# Patient Record
Sex: FEMALE | Race: WHITE | NOT HISPANIC OR LATINO | Employment: OTHER | URBAN - METROPOLITAN AREA
[De-identification: names, ages, dates, MRNs, and addresses within clinical notes are randomized per-mention and may not be internally consistent; named-entity substitution may affect disease eponyms.]

---

## 2017-01-16 ENCOUNTER — OFFICE VISIT (OUTPATIENT)
Dept: RHEUMATOLOGY | Facility: CLINIC | Age: 62
End: 2017-01-16
Payer: COMMERCIAL

## 2017-01-16 VITALS
WEIGHT: 227.19 LBS | DIASTOLIC BLOOD PRESSURE: 80 MMHG | HEIGHT: 60 IN | HEART RATE: 78 BPM | BODY MASS INDEX: 44.6 KG/M2 | SYSTOLIC BLOOD PRESSURE: 139 MMHG

## 2017-01-16 DIAGNOSIS — M19.90 INFLAMMATORY ARTHRITIS: Primary | ICD-10-CM

## 2017-01-16 DIAGNOSIS — M17.0 PRIMARY OSTEOARTHRITIS OF BOTH KNEES: ICD-10-CM

## 2017-01-16 DIAGNOSIS — E66.01 MORBID OBESITY WITH BMI OF 45.0-49.9, ADULT: ICD-10-CM

## 2017-01-16 PROCEDURE — 99213 OFFICE O/P EST LOW 20 MIN: CPT | Mod: S$GLB,,, | Performed by: INTERNAL MEDICINE

## 2017-01-16 PROCEDURE — 1159F MED LIST DOCD IN RCRD: CPT | Mod: S$GLB,,, | Performed by: INTERNAL MEDICINE

## 2017-01-16 PROCEDURE — 99999 PR PBB SHADOW E&M-EST. PATIENT-LVL III: CPT | Mod: PBBFAC,,, | Performed by: INTERNAL MEDICINE

## 2017-01-16 PROCEDURE — 3079F DIAST BP 80-89 MM HG: CPT | Mod: S$GLB,,, | Performed by: INTERNAL MEDICINE

## 2017-01-16 PROCEDURE — 3075F SYST BP GE 130 - 139MM HG: CPT | Mod: S$GLB,,, | Performed by: INTERNAL MEDICINE

## 2017-01-16 ASSESSMENT — ROUTINE ASSESSMENT OF PATIENT INDEX DATA (RAPID3)
AM STIFFNESS SCORE: 1, YES
WHEN YOU AWAKENED IN THE MORNING OVER THE LAST WEEK, PLEASE INDICATE THE AMOUNT OF TIME IT TAKES UNTIL YOU ARE AS LIMBER AS YOU WILL BE FOR THE DAY: 10MIN
PSYCHOLOGICAL DISTRESS SCORE: 3.3
PAIN SCORE: 4
FATIGUE SCORE: .5
TOTAL RAPID3 SCORE: 2.78
MDHAQ FUNCTION SCORE: .4
PATIENT GLOBAL ASSESSMENT SCORE: 3

## 2017-01-17 NOTE — PROGRESS NOTES
History of present illness: 62-year-old female I saw initially 2 years ago.  She presented at that time with a migratory arthritis.  It followed a shingles-like rash.  It lasted for several months but then resolved.  She had increased inflammatory markers but was otherwise seronegative.  She has been treated primarily with anti-inflammatory medications.  She has had short-term corticosteroids is not been on chronic prednisone therapy.  She has had several flares since that time.  In addition she has developed constant pain in the knees.  She has evidence of osteoarthritis.  I injected her knee in September and her knee continues to do better.    She still complains of some intermittent aching.  She stop sulindac because of side effects.  She is now on Advil 4 daily.  This helps and she tolerates it.  She has had some burning pain at night.  She has some pain with activity.  She has no morning stiffness.  She has had no recent joint swelling.  She is completing her evaluation for sleep apnea.  She has had no other recent medical problems.    Physical examination:  Musculoskeletal: She has tenderness to palpation in the right knee in the right ankle.  She has full range of motion of these joints.  She has no evidence of active synovitis.    Assessment: She has underlying osteoarthritis of the knees.  She has had an intermittent inflammatory arthritis of uncertain etiology.    Plans:  1.  Laboratory studies are obtained  2.  I have set her up for a joint scan  3.  She is to continue Advil up to 8 daily  4.  I recommend topical medication with lidocaine.  5.  Return to see me in 4 months and less her symptoms get worse.

## 2017-01-25 ENCOUNTER — HOSPITAL ENCOUNTER (OUTPATIENT)
Dept: RADIOLOGY | Facility: HOSPITAL | Age: 62
Discharge: HOME | End: 2017-01-25
Attending: INTERNAL MEDICINE
Payer: COMMERCIAL

## 2017-01-25 DIAGNOSIS — M19.90 INFLAMMATORY ARTHRITIS: ICD-10-CM

## 2017-01-25 PROCEDURE — 78306 BONE IMAGING WHOLE BODY: CPT | Mod: 26,,, | Performed by: RADIOLOGY

## 2017-01-25 PROCEDURE — A9512 TC99M PERTECHNETATE: HCPCS

## 2017-02-22 ENCOUNTER — OFFICE VISIT (OUTPATIENT)
Dept: RHEUMATOLOGY | Facility: CLINIC | Age: 62
End: 2017-02-22
Payer: COMMERCIAL

## 2017-02-22 DIAGNOSIS — M17.0 PRIMARY OSTEOARTHRITIS OF BOTH KNEES: ICD-10-CM

## 2017-02-22 DIAGNOSIS — M06.00 SERONEGATIVE RHEUMATOID ARTHRITIS: Primary | ICD-10-CM

## 2017-02-22 PROCEDURE — 1160F RVW MEDS BY RX/DR IN RCRD: CPT | Mod: S$GLB,,, | Performed by: INTERNAL MEDICINE

## 2017-02-22 PROCEDURE — 99999 PR PBB SHADOW E&M-EST. PATIENT-LVL II: CPT | Mod: PBBFAC,,, | Performed by: INTERNAL MEDICINE

## 2017-02-22 PROCEDURE — 99214 OFFICE O/P EST MOD 30 MIN: CPT | Mod: S$GLB,,, | Performed by: INTERNAL MEDICINE

## 2017-02-22 RX ORDER — HYDROXYCHLOROQUINE SULFATE 200 MG/1
400 TABLET, FILM COATED ORAL DAILY
Qty: 60 TABLET | Refills: 5 | Status: SHIPPED | OUTPATIENT
Start: 2017-02-22 | End: 2017-03-24

## 2017-02-27 NOTE — PROGRESS NOTES
History of present illness: 62-year-old female has osteoarthritis of the knees.  She also has an intermittent inflammatory arthritis which is seronegative.  She was last seen in January.  She still had increased inflammatory markers.  She had a joint scan which showed persistent inflammation.  I therefore brought her ran to discuss the possibility of DMARD's.    She still has some pain in the right knee.  She has had no swelling.  She has some aching all over but it has improved.  She denies any joint swelling.  She remains on ibuprofen daily.  She has some pain with activity.  She has no significant morning stiffness.    Physical examination:  Musculoskeletal: She has pain or range of motion of the left shoulder.  She has decreased range of motion.  She has no trigger area.  She has no swelling.  Right shoulder is unremarkable.  Elbows, wrists, small joints the hands are unremarkable.  Lumbar spine and hips have good range of motion.  She has bony hypertrophy of the knees bilaterally, worse on the left than on the right.  She has pain on range of motion of the knees, worse on the right than on the left.  She has tenderness of the right knee but no effusion.  She has tenderness of the right ankle and bony hypertrophy.    Assessment: Seronegative rheumatoid arthritis    Plans: After discussion with her and her  I elected to place her on Plaquenil 400 mg daily.  She is to continue Advil as before.  She is to keep her previous appointment.

## 2017-03-11 ENCOUNTER — PATIENT MESSAGE (OUTPATIENT)
Dept: RHEUMATOLOGY | Facility: CLINIC | Age: 62
End: 2017-03-11

## 2017-04-24 DIAGNOSIS — J30.9 ALLERGIC RHINITIS: ICD-10-CM

## 2017-04-24 RX ORDER — FLUTICASONE PROPIONATE 50 MCG
SPRAY, SUSPENSION (ML) NASAL
Qty: 16 G | Refills: 2 | Status: SHIPPED | OUTPATIENT
Start: 2017-04-24 | End: 2017-11-08 | Stop reason: SDUPTHER

## 2017-05-01 ENCOUNTER — OFFICE VISIT (OUTPATIENT)
Dept: RHEUMATOLOGY | Facility: CLINIC | Age: 62
End: 2017-05-01
Payer: COMMERCIAL

## 2017-05-01 VITALS
WEIGHT: 225.88 LBS | HEART RATE: 70 BPM | HEIGHT: 60 IN | SYSTOLIC BLOOD PRESSURE: 146 MMHG | DIASTOLIC BLOOD PRESSURE: 77 MMHG | BODY MASS INDEX: 44.35 KG/M2

## 2017-05-01 DIAGNOSIS — M17.11 PRIMARY OSTEOARTHRITIS OF RIGHT KNEE: ICD-10-CM

## 2017-05-01 DIAGNOSIS — M06.00 SERONEGATIVE RHEUMATOID ARTHRITIS: Primary | ICD-10-CM

## 2017-05-01 PROCEDURE — 3078F DIAST BP <80 MM HG: CPT | Mod: S$GLB,,, | Performed by: INTERNAL MEDICINE

## 2017-05-01 PROCEDURE — 3077F SYST BP >= 140 MM HG: CPT | Mod: S$GLB,,, | Performed by: INTERNAL MEDICINE

## 2017-05-01 PROCEDURE — 99999 PR PBB SHADOW E&M-EST. PATIENT-LVL III: CPT | Mod: PBBFAC,,, | Performed by: INTERNAL MEDICINE

## 2017-05-01 PROCEDURE — 20610 DRAIN/INJ JOINT/BURSA W/O US: CPT | Mod: S$GLB,,, | Performed by: INTERNAL MEDICINE

## 2017-05-01 PROCEDURE — 99213 OFFICE O/P EST LOW 20 MIN: CPT | Mod: 25,S$GLB,, | Performed by: INTERNAL MEDICINE

## 2017-05-01 PROCEDURE — 1160F RVW MEDS BY RX/DR IN RCRD: CPT | Mod: S$GLB,,, | Performed by: INTERNAL MEDICINE

## 2017-05-01 RX ADMIN — TRIAMCINOLONE ACETONIDE 40 MG: 40 INJECTION, SUSPENSION INTRA-ARTICULAR; INTRAMUSCULAR at 01:05

## 2017-05-01 NOTE — PROCEDURES
Large Joint Aspiration/Injection  Date/Time: 5/1/2017 1:44 PM  Performed by: MARINE VAN  Authorized by: MARINE VAN     Consent Done?:  Yes (Verbal)  Indications:  Pain  Procedure site marked: Yes      Location:  Knee  Site:  R knee  Prep: Patient was prepped and draped in usual sterile fashion    Needle size:  25 G  Approach:  Medial  Medications:  40 mg triamcinolone acetonide 40 mg/mL  Patient tolerance:  Patient tolerated the procedure well with no immediate complications

## 2017-05-02 RX ORDER — TRIAMCINOLONE ACETONIDE 40 MG/ML
40 INJECTION, SUSPENSION INTRA-ARTICULAR; INTRAMUSCULAR
Status: DISCONTINUED | OUTPATIENT
Start: 2017-05-01 | End: 2017-05-02 | Stop reason: HOSPADM

## 2017-05-02 NOTE — PROGRESS NOTES
History of present illness: 62-year-old female with known osteoarthritis of the knees.  I have been seeing her for an intermittent inflammatory arthritis.  She is seronegative.  She had persistently elevated inflammatory markers.  I ordered a joint scan which showed persistent synovitis.  I started her on Plaquenil in February.  She does not think she has had a response yet.  She still complains of pain, especially in the knees.  She has also had pain in the right buttock.  She has been taking ibuprofen up to 4 daily with some relief.  She denies any joint swelling.  She has had no numbness or tingling.  She is tolerating the medication.    Physical examination:  Musculoskeletal: Cervical spine has good range of motion.  Shoulders, elbows, wrists, small joints of the hands are unremarkable.  She has good range of motion of the lumbar spine.  She has no tender area to palpation.  She has pain on range of motion of the right hip but has good range of motion.  Left hip is unremarkable.  She has bony hypertrophy of both knees but no effusion.  The right knee is tender.  She has tenderness of the right ankle.  Small joints of the feet are unremarkable.    Assessment:  1.  Stable inflammatory arthritis  2.  Her knee problem is more likely due to osteoarthritis    Plans:  1.  I will see how she responds to the injection  2.  Continue medications as before  3.  Return to see me in 3 months

## 2017-05-14 ENCOUNTER — PATIENT MESSAGE (OUTPATIENT)
Dept: RHEUMATOLOGY | Facility: CLINIC | Age: 62
End: 2017-05-14

## 2017-05-14 DIAGNOSIS — M54.32 SCIATICA OF LEFT SIDE: Primary | ICD-10-CM

## 2017-05-15 ENCOUNTER — HOSPITAL ENCOUNTER (EMERGENCY)
Facility: HOSPITAL | Age: 62
Discharge: HOME | End: 2017-05-15
Attending: EMERGENCY MEDICINE
Payer: COMMERCIAL

## 2017-05-15 ENCOUNTER — TELEPHONE (OUTPATIENT)
Dept: RHEUMATOLOGY | Facility: CLINIC | Age: 62
End: 2017-05-15

## 2017-05-15 VITALS
SYSTOLIC BLOOD PRESSURE: 171 MMHG | OXYGEN SATURATION: 97 % | RESPIRATION RATE: 18 BRPM | BODY MASS INDEX: 43.19 KG/M2 | WEIGHT: 220 LBS | TEMPERATURE: 98 F | HEIGHT: 60 IN | HEART RATE: 72 BPM | DIASTOLIC BLOOD PRESSURE: 83 MMHG

## 2017-05-15 DIAGNOSIS — M54.41 RIGHT-SIDED LOW BACK PAIN WITH RIGHT-SIDED SCIATICA, UNSPECIFIED CHRONICITY: Primary | ICD-10-CM

## 2017-05-15 PROCEDURE — 99283 EMERGENCY DEPT VISIT LOW MDM: CPT

## 2017-05-15 PROCEDURE — 99283 EMERGENCY DEPT VISIT LOW MDM: CPT | Mod: ,,, | Performed by: EMERGENCY MEDICINE

## 2017-05-15 RX ORDER — METHYLPREDNISOLONE 4 MG/1
TABLET ORAL
Qty: 1 PACKAGE | Refills: 0 | Status: SHIPPED | OUTPATIENT
Start: 2017-05-15 | End: 2017-06-05

## 2017-05-15 RX ORDER — TRAMADOL HYDROCHLORIDE 50 MG/1
50 TABLET ORAL EVERY 6 HOURS PRN
Qty: 8 TABLET | Refills: 0 | Status: SHIPPED | OUTPATIENT
Start: 2017-05-15 | End: 2017-05-25

## 2017-05-15 RX ORDER — IBUPROFEN 600 MG/1
600 TABLET ORAL 3 TIMES DAILY PRN
Qty: 21 TABLET | Refills: 0 | Status: SHIPPED | OUTPATIENT
Start: 2017-05-15 | End: 2017-05-22

## 2017-05-15 RX ORDER — GABAPENTIN 100 MG/1
100 CAPSULE ORAL 3 TIMES DAILY
Qty: 42 CAPSULE | Refills: 0 | Status: SHIPPED | OUTPATIENT
Start: 2017-05-15 | End: 2017-08-14

## 2017-05-15 NOTE — DISCHARGE INSTRUCTIONS
Back Pain (Acute or Chronic)    Back pain is one of the most common problems. The good news is that most people feel better in 1 to 2 weeks, and most of the rest in 1 to 2 months. Most people can remain active.  People experience and describe pain differently; not everyone is the same.  · The pain can be sharp, stabbing, shooting, aching, cramping or burning.  · Movement, standing, bending, lifting, sitting, or walking may worsen pain.  · It can be localized to one spot or area, or it can be more generalized.  · It can spread or radiate upwards, to the front, or go down your arms or legs (sciatica).  · It can cause muscle spasm.  Most of the time, mechanical problems with the muscles or spine cause the pain. Mechanical problems are usually caused by an injury to the muscles or ligaments. While illness can cause back pain, it is usually not caused by a serious illness. Mechanical problems include:   · Physical activity such as sports, exercise, work, or normal activity  · Overexertion, lifting, pushing, pulling incorrectly or too aggressively  · Sudden twisting, bending, or stretching from an accident, or accidental movement  · Poor posture  · Stretching or moving wrong, without noticing pain at the time  · Poor coordination, lack of regular exercise (check with your doctor about this)  · Spinal disc disease or arthritis  · Stress  Pain can also be related to pregnancy, or illness like appendicitis, bladder or kidney infections, pelvic infections, and many other things.  Acute back pain usually gets better in 1 to 2 weeks. Back pain related to disk disease, arthritis in the spinal joints or spinal stenosis (narrowing of the spinal canal) can become chronic and last for months or years.  Unless you had a physical injury (for example, a car accident or fall) X-rays are usually not needed for the initial evaluation of back pain. If pain continues and does not respond to medical treatment, X-rays and other tests may be  needed.  Home care  Try these home care recommendations:  · When in bed, try to find a position of comfort. A firm mattress is best. Try lying flat on your back with pillows under your knees. You can also try lying on your side with your knees bent up towards your chest and a pillow between your knees.  · At first, do not try to stretch out the sore spots. If there is a strain, it is not like the good soreness you get after exercising without an injury. In this case, stretching may make it worse.  · Avoid prolong sitting, long car rides, or travel. This puts more stress on the lower back than standing or walking.  · During the first 24 to 72 hours after an acute injury or flare up of chronic back pain, apply an ice pack to the painful area for 20 minutes and then remove it for 20 minutes. Do this over a period of 60 to 90 minutes or several times a day. This will reduce swelling and pain. Wrap the ice pack in a thin towel or plastic to protect your skin.  · You can start with ice, then switch to heat. Heat (hot shower, hot bath, or heating pad) reduces pain and works well for muscle spasms. Heat can be applied to the painful area for 20 minutes then remove it for 20 minutes. Do this over a period of 60 to 90 minutes or several times a day. Do not sleep on a heating pad. It can lead to skin burns or tissue damage.  · You can alternate ice and heat therapy. Talk with your doctor about the best treatment for your back pain.  · Therapeutic massage can help relax the back muscles without stretching them.  · Be aware of safe lifting methods and do not lift anything without stretching first.  Medicines  Talk to your doctor before using medicine, especially if you have other medical problems or are taking other medicines.  · You may use over-the-counter medicine as directed on the bottle to control pain, unless another pain medicine was prescribed. If you have chronic conditions like diabetes, liver or kidney disease,  stomach ulcers, or gastrointestinal bleeding, or are taking blood thinners, talk to your doctor before taking any medicine.  · Be careful if you are given a prescription medicines, narcotics, or medicine for muscle spasms. They can cause drowsiness, affect your coordination, reflexes, and judgement. Do not drive or operate heavy machinery.  Follow-up care  Follow up with your healthcare provider, or as advised.   A radiologist will review any X-rays that were taken. Your provide will notify you of any new findings that may affect your care.  Call 911  Call emergency services if any of the following occur:  · Trouble breathing  · Confusion  · Very drowsy or trouble awakening  · Fainting or loss of consciousness  · Rapid or very slow heart rate  · Loss of bowel or bladder control  When to seek medical advice  Call your healthcare provider right away if any of these occur:   · Pain becomes worse or spreads to your legs  · Weakness or numbness in one or both legs  · Numbness in the groin or genital area  Date Last Reviewed: 7/1/2016  © 1148-1791 [employer]. [address] [place] [address], [place] [address]. All rights reserved. This information is not intended as a substitute for professional medical care. Always follow your healthcare professional's instructions.

## 2017-05-15 NOTE — ED AVS SNAPSHOT
[facility]  [address]  [facility] [address]               Apoorva Fernandez   5/15/2017  1:49 PM   ED    Description:  Female : 1955   [facility]           Your Care was Coordinated By:     Provider Role From To    Katia Bee MD Attending Provider 05/15/17 1414 --      Reason for Visit     Hip Pain           Diagnoses this Visit        Comments    Right-sided low back pain with right-sided sciatica, unspecified chronicity    -  Primary       ED Disposition     None           To Do List           Follow-up Information     Follow up with Najma Rangel MD In 3 days.    Specialties:  Internal Medicine, Pediatrics    Contact information:    [address] [place] [id number] [phone]         These Medications        Disp Refills Start End    gabapentin (NEURONTIN) 100 MG capsule 42 capsule 0 5/15/2017 2017    Take 1 capsule (100 mg total) by mouth 3 (three) times daily. - Oral    Pharmacy: [facility] [address] [place] [address] [address] [facility] Ph #: [phone]       methylPREDNISolone (MEDROL DOSEPACK) 4 mg tablet 1 Package 0 5/15/2017 2017    As directed    Pharmacy: [facility] [address] [facility] Ph #: [phone]       tramadol (ULTRAM) 50 mg tablet 8 tablet 0 5/15/2017 2017    Take 1 tablet (50 mg total) by mouth every 6 (six) hours as needed for Pain. - Oral    Pharmacy: [facility] [address] [place] [address] [address] [facility] Ph #: [phone]       ibuprofen (ADVIL,MOTRIN) 600 MG tablet 21 tablet 0 5/15/2017 2017    Take 1 tablet (600 mg total) by mouth 3 (three) times daily as needed for Pain. - Oral    Pharmacy: [facility] [address] [facility] Ph #: [phone]         [facility]     [facility]  - 24/7 Assistance  Unless otherwise directed by your provider, please contact [facility], our nurse care line that is available for 24/7 assistance.     Registered nurses in the [facility] provide: appointment scheduling, clinical advisement, health education, and other advisory services.  Call: [phone] (toll free)               Medications           Message regarding Medications     Verify the changes and/or additions to your medication regime listed below are the same as discussed with your clinician today.  If any of these changes or additions are incorrect, please notify your healthcare provider.        START taking these NEW medications        Refills    gabapentin (NEURONTIN) 100 MG capsule 0    Sig: Take 1 capsule (100 mg total) by mouth 3 (three) times daily.    Class: Print    Route: Oral    methylPREDNISolone (MEDROL DOSEPACK) 4 mg tablet 0    Sig: As directed    Class: Print    tramadol (ULTRAM) 50 mg tablet 0    Sig: Take 1 tablet (50 mg total) by mouth every 6 (six) hours as needed for Pain.    Class: Print    Route: Oral    ibuprofen (ADVIL,MOTRIN) 600 MG tablet 0    Sig: Take 1 tablet (600 mg total) by mouth 3 (three) times daily as needed for Pain.    Class: Print    Route: Oral      STOP taking these medications     albuterol 90 mcg/actuation inhaler Inhale 2 puffs into the lungs every 6 (six) hours as needed for Wheezing or Shortness of Breath.           Verify that the below list of medications is an accurate representation of the medications you are currently taking.  If none reported, the list may be blank. If incorrect, please contact your healthcare provider. Carry this list with you in case of emergency.           Current Medications     fluticasone (FLONASE) 50 mcg/actuation nasal spray SPRAY 1 SPRAY INTO EACH NOSTRIL ONCE EVERY DAY    ibuprofen (ADVIL,MOTRIN) 200 MG tablet Take 800 mg by mouth every 6 (six) hours as needed for Pain.     MULTIVITS-MIN/FA/CA CARB/VIT K  (ONE-A-DAY WOMEN'S 50+ ORAL) Take 1 tablet by mouth once daily.    omega 3-dha-epa-fish oil 1,000 (120-180) mg Cap Take 1 capsule by mouth once daily.    gabapentin (NEURONTIN) 100 MG capsule Take 1 capsule (100 mg total) by mouth 3 (three) times daily.    ibuprofen (ADVIL,MOTRIN) 600 MG tablet Take 1 tablet (600 mg total) by mouth 3 (three) times daily as needed for Pain.    methylPREDNISolone (MEDROL DOSEPACK) 4 mg tablet As directed    tramadol (ULTRAM) 50 mg tablet Take 1 tablet (50 mg total) by mouth every 6 (six) hours as needed for Pain.           Clinical Reference Information           Your Vitals Were     BP Pulse Temp Resp Height Weight    171/83 72 98.1 °F (36.7 °C) (Oral) 18 5' (1.524 m) 99.8 kg (220 lb)    Last Period SpO2 BMI          08/22/2015 (Exact Date) 97% 42.97 kg/m2        Allergies as of 5/15/2017        Reactions    Antihistamines - Alkylamine Shortness Of Breath    Lipitor  [Atorvastatin]     Other reaction(s): myalgia    Zetia  [Ezetimibe]     Other reaction(s): muscle cramps      Immunizations Administered on Date of Encounter - 5/15/2017     None      ED Micro, Lab, POCT     None      ED Imaging Orders     None        Discharge Instructions         Back Pain (Acute or Chronic)    Back pain is one of the most common problems. The good news is that most people feel better in 1 to 2 weeks, and most of the rest in 1 to 2 months. Most people can remain active.  People experience and describe pain differently; not everyone is the same.  · The pain can be sharp, stabbing, shooting, aching, cramping or burning.  · Movement, standing, bending, lifting, sitting, or walking may worsen pain.  · It can be localized to one spot or area, or it can be more generalized.  · It can spread or radiate upwards, to the front, or go down your arms or legs (sciatica).  · It can cause muscle spasm.  Most of the time, mechanical problems with the muscles or spine cause the pain. Mechanical problems are usually  caused by an injury to the muscles or ligaments. While illness can cause back pain, it is usually not caused by a serious illness. Mechanical problems include:   · Physical activity such as sports, exercise, work, or normal activity  · Overexertion, lifting, pushing, pulling incorrectly or too aggressively  · Sudden twisting, bending, or stretching from an accident, or accidental movement  · Poor posture  · Stretching or moving wrong, without noticing pain at the time  · Poor coordination, lack of regular exercise (check with your doctor about this)  · Spinal disc disease or arthritis  · Stress  Pain can also be related to pregnancy, or illness like appendicitis, bladder or kidney infections, pelvic infections, and many other things.  Acute back pain usually gets better in 1 to 2 weeks. Back pain related to disk disease, arthritis in the spinal joints or spinal stenosis (narrowing of the spinal canal) can become chronic and last for months or years.  Unless you had a physical injury (for example, a car accident or fall) X-rays are usually not needed for the initial evaluation of back pain. If pain continues and does not respond to medical treatment, X-rays and other tests may be needed.  Home care  Try these home care recommendations:  · When in bed, try to find a position of comfort. A firm mattress is best. Try lying flat on your back with pillows under your knees. You can also try lying on your side with your knees bent up towards your chest and a pillow between your knees.  · At first, do not try to stretch out the sore spots. If there is a strain, it is not like the good soreness you get after exercising without an injury. In this case, stretching may make it worse.  · Avoid prolong sitting, long car rides, or travel. This puts more stress on the lower back than standing or walking.  · During the first 24 to 72 hours after an acute injury or flare up of chronic back pain, apply an ice pack to the painful area  for 20 minutes and then remove it for 20 minutes. Do this over a period of 60 to 90 minutes or several times a day. This will reduce swelling and pain. Wrap the ice pack in a thin towel or plastic to protect your skin.  · You can start with ice, then switch to heat. Heat (hot shower, hot bath, or heating pad) reduces pain and works well for muscle spasms. Heat can be applied to the painful area for 20 minutes then remove it for 20 minutes. Do this over a period of 60 to 90 minutes or several times a day. Do not sleep on a heating pad. It can lead to skin burns or tissue damage.  · You can alternate ice and heat therapy. Talk with your doctor about the best treatment for your back pain.  · Therapeutic massage can help relax the back muscles without stretching them.  · Be aware of safe lifting methods and do not lift anything without stretching first.  Medicines  Talk to your doctor before using medicine, especially if you have other medical problems or are taking other medicines.  · You may use over-the-counter medicine as directed on the bottle to control pain, unless another pain medicine was prescribed. If you have chronic conditions like diabetes, liver or kidney disease, stomach ulcers, or gastrointestinal bleeding, or are taking blood thinners, talk to your doctor before taking any medicine.  · Be careful if you are given a prescription medicines, narcotics, or medicine for muscle spasms. They can cause drowsiness, affect your coordination, reflexes, and judgement. Do not drive or operate heavy machinery.  Follow-up care  Follow up with your healthcare provider, or as advised.   A radiologist will review any X-rays that were taken. Your provide will notify you of any new findings that may affect your care.  Call 911  Call emergency services if any of the following occur:  · Trouble breathing  · Confusion  · Very drowsy or trouble awakening  · Fainting or loss of consciousness  · Rapid or very slow heart  rate  · Loss of bowel or bladder control  When to seek medical advice  Call your healthcare provider right away if any of these occur:   · Pain becomes worse or spreads to your legs  · Weakness or numbness in one or both legs  · Numbness in the groin or genital area  Date Last Reviewed: 7/1/2016  © 6372-8963 [employer]. [address] [place] [address]. All rights reserved. This information is not intended as a substitute for professional medical care. Always follow your healthcare professional's instructions.           [facility] complies with applicable Federal civil rights laws and does not discriminate on the basis of race, color, national origin, age, disability, or sex.        Language Assistance Services     ATTENTION: Language assistance services are available, free of charge. Please call [phone].      ATENCIÓN: Si habla español, tiene a chávez disposición servicios gratuitos de asistencia lingüística. Llame al [phone].     CHÚ Ý: N?u b?n nói Ti?ng Vi?t, có các d?ch v? h? tr? ngôn ng? mi?n phí dành cho b?n. G?i s? [phone].

## 2017-05-15 NOTE — ED NOTES
LOC: The patient is awake, alert, and oriented to place, time, situation. Affect is appropriate.  Speech is appropriate and clear.     APPEARANCE: Patient in wheelchair, states not comfortable..  Patient is clean and well groomed.    SKIN: The skin is warm and dry; color consistent with ethnicity.  Patient has normal skin turgor and moist mucus membranes.  Skin intact; no breakdown or bruising noted.     MUSCULOSKELETAL: Patient moving upper and lower extremities  but state movement in right lower extremity  results  In  increase in pain.  Denies weakness.     RESPIRATORY: Airway is open and patent. Respirations spontaneous, even, easy, and non-labored.  Patient has a normal effort and rate.  No accessory muscle use noted. Denies cough.     CARDIAC:  Normal rhythm and rate noted.  No peripheral edema noted. No complaints of chest pain.      ABDOMEN: Soft and non tender to palpation.  No distention noted.     NEUROLOGIC: Eyes open spontaneously.  Behavior appropriate to situation.  Follows commands; facial expression symmetrical.  Purposeful motor response noted; normal sensation in all extremities.

## 2017-05-15 NOTE — ED TRIAGE NOTES
C/o right hip pain, numbness in right foot and toes, muscle spasms X 2 weeks.  States had a cortisone injection in right knee May 1 st.  Pt stated  also stopped plaqunil. States pain is 10/10.   Pt states she is able to walk but increases pain.

## 2017-05-15 NOTE — ED PROVIDER NOTES
Encounter Date: 5/15/2017    SCRIBE #1 NOTE: I, Lelo Murray, am scribing for, and in the presence of,  Dr. Bee. I have scribed the entire note.       History     Chief Complaint   Patient presents with    Hip Pain     r hip pain going to r foot, started 2 weeks ago with numbess for 1 week     Review of patient's allergies indicates:   Allergen Reactions    Antihistamines - alkylamine Shortness Of Breath    Lipitor  [atorvastatin]      Other reaction(s): myalgia    Zetia  [ezetimibe]      Other reaction(s): muscle cramps     HPI Comments: Time patient was seen by the provider: 2:22 PM      The patient is a 62 y.o. female with hx of: PUD, HTN, obesity, umbilical hernia, OA of both knees, seronegative rheumatoid arthritis followed by rheumatology that presents to the ED with a complaint of 10/10 right hip pain for the last 2 weeks that radiates down the right leg with associated numbness to the first two toes on the right foot. Patient has been taking Ibuprofen 800 mg every 6 hours at home for the last few weeks with minimal relief. Patient denies back pain, fever, chills, or urinary or bowel incontinence. She has not been doing any recent walking, exercising, or increases in activity, but she does mention that she may have exacerbated the pain by kicking the recliner home. Patient discontinued Plaquenil 400 1.5 weeks ago due to side effects and a rash.         The history is provided by the patient.     Past Medical History:   Diagnosis Date    Allergic rhinitis     Allergy     Arthritis     Hyperlipidemia     Diet-controlled    Hypertension     Joint pain     Keloid cicatrix     Obesity     Personal history of colonic polyps 2010    Adenomatous    Personal history of peptic ulcer disease     Umbilical hernia      Past Surgical History:   Procedure Laterality Date    Abdominal/umbilical hernia repair      removed small part of small bowel; mesh placed     SECTION, LOW  TRANSVERSE      X2    CHOLECYSTECTOMY      Open laparoscopy for a ruptured ulcer       Family History   Problem Relation Age of Onset    Breast cancer Mother       at age 65    Cancer Father      Bladder    Glaucoma Father     Hypertension Father     Heart disease Father     Obesity Sister     Heart disease Brother     Sleep apnea Brother     Diabetes Neg Hx     Melanoma Neg Hx      Social History   Substance Use Topics    Smoking status: Never Smoker    Smokeless tobacco: Never Used    Alcohol use 1.2 - 1.8 oz/week     2 - 3 Glasses of wine per week      Comment: Socially on the weekends     Review of Systems   Constitutional: Negative for chills and fever.   Gastrointestinal:        Negative for bowel incontinence   Genitourinary: Negative for difficulty urinating.        Negative for urinary incontinence   Musculoskeletal: Negative for back pain.        Positive for right hip pain   Skin: Negative for wound.   Neurological: Positive for numbness (right toes).       Physical Exam   Initial Vitals   BP Pulse Resp Temp SpO2   05/15/17 1309 05/15/17 1309 05/15/17 1309 05/15/17 1309 05/15/17 1309   171/83 72 18 98.1 °F (36.7 °C) 97 %     Physical Exam    Nursing note and vitals reviewed.  Constitutional: She appears well-developed and well-nourished. She is not diaphoretic. No distress.   HENT:   Head: Normocephalic and atraumatic.   Musculoskeletal: Normal range of motion.   Patient is able to go from a sitting to standing to laying position without any difficulty and without assistance. Positive straight leg raise at 80 degrees on right. Normal hip range of motion. No spinal tenderness. Mild tenderness to palpation of right paralumbar muscles.    Neurological: She is alert and oriented to person, place, and time. Gait normal.   Reflex Scores:       Patellar reflexes are 2+ on the right side and 2+ on the left side.       Achilles reflexes are 2+ on the right side and 2+ on the left  side.  Sensation decreased over right first toe, otherwise normal sensation of both lower extremities.    Skin: Skin is warm and dry. No rash noted. No erythema.         ED Course   Procedures  Labs Reviewed - No data to display          Medical Decision Making:   History:   Old Medical Records: I decided to obtain old medical records.  Old Records Summarized: other records.       <> Summary of Records: Patient with a history of PUD, HTN, obesity, umbilical hernia, OA of both knees, seronegative rheumatoid arthritis followed by rheumatology status post steroid injection to right knee 5/1/17  Initial Assessment:   Patient with right lower back pain radiating down the leg consistent with sciatica. Will treat with gabapentin, medrol dose pack, NSAIDs, and tramadol. Will discharge with outpatient PCP follow up.             Scribe Attestation:   Scribe #1: I performed the above scribed service and the documentation accurately describes the services I performed. I attest to the accuracy of the note.    Attending Attestation:           Physician Attestation for Scribe:  Physician Attestation Statement for Scribe #1: I, Dr. Bee, reviewed documentation, as scribed by Lelo Murray in my presence, and it is both accurate and complete.                 ED Course     Clinical Impression:   The encounter diagnosis was Right-sided low back pain with right-sided sciatica, unspecified chronicity.    Disposition:   Disposition: Discharged  Condition: Stable       Katia Bee MD  05/16/17 1006

## 2017-08-14 ENCOUNTER — LAB VISIT (OUTPATIENT)
Dept: LAB | Facility: HOSPITAL | Age: 62
End: 2017-08-14
Attending: INTERNAL MEDICINE
Payer: COMMERCIAL

## 2017-08-14 ENCOUNTER — OFFICE VISIT (OUTPATIENT)
Dept: RHEUMATOLOGY | Facility: CLINIC | Age: 62
End: 2017-08-14
Payer: COMMERCIAL

## 2017-08-14 VITALS
BODY MASS INDEX: 44.04 KG/M2 | SYSTOLIC BLOOD PRESSURE: 155 MMHG | WEIGHT: 225.5 LBS | DIASTOLIC BLOOD PRESSURE: 77 MMHG | HEART RATE: 72 BPM | RESPIRATION RATE: 20 BRPM

## 2017-08-14 DIAGNOSIS — M15.9 PRIMARY OSTEOARTHRITIS INVOLVING MULTIPLE JOINTS: ICD-10-CM

## 2017-08-14 DIAGNOSIS — M06.00 SERONEGATIVE RHEUMATOID ARTHRITIS: Primary | ICD-10-CM

## 2017-08-14 DIAGNOSIS — M06.00 SERONEGATIVE RHEUMATOID ARTHRITIS: ICD-10-CM

## 2017-08-14 DIAGNOSIS — E66.01 MORBID OBESITY WITH BMI OF 45.0-49.9, ADULT: ICD-10-CM

## 2017-08-14 PROBLEM — M15.0 PRIMARY OSTEOARTHRITIS INVOLVING MULTIPLE JOINTS: Status: ACTIVE | Noted: 2017-08-14

## 2017-08-14 LAB — ERYTHROCYTE [SEDIMENTATION RATE] IN BLOOD BY WESTERGREN METHOD: 28 MM/HR

## 2017-08-14 PROCEDURE — 99999 PR PBB SHADOW E&M-EST. PATIENT-LVL III: CPT | Mod: PBBFAC,,, | Performed by: INTERNAL MEDICINE

## 2017-08-14 PROCEDURE — 3008F BODY MASS INDEX DOCD: CPT | Mod: S$GLB,,, | Performed by: INTERNAL MEDICINE

## 2017-08-14 PROCEDURE — 86140 C-REACTIVE PROTEIN: CPT

## 2017-08-14 PROCEDURE — 99213 OFFICE O/P EST LOW 20 MIN: CPT | Mod: S$GLB,,, | Performed by: INTERNAL MEDICINE

## 2017-08-14 PROCEDURE — 36415 COLL VENOUS BLD VENIPUNCTURE: CPT

## 2017-08-14 PROCEDURE — 85651 RBC SED RATE NONAUTOMATED: CPT

## 2017-08-14 PROCEDURE — 3078F DIAST BP <80 MM HG: CPT | Mod: S$GLB,,, | Performed by: INTERNAL MEDICINE

## 2017-08-14 PROCEDURE — 3077F SYST BP >= 140 MM HG: CPT | Mod: S$GLB,,, | Performed by: INTERNAL MEDICINE

## 2017-08-14 RX ORDER — ASPIRIN 81 MG/1
81 TABLET ORAL DAILY
Status: ON HOLD | COMMUNITY
End: 2022-08-15 | Stop reason: SDUPTHER

## 2017-08-14 RX ORDER — LIDOCAINE 50 MG/G
1 PATCH TOPICAL
COMMUNITY
End: 2024-01-20

## 2017-08-14 ASSESSMENT — ROUTINE ASSESSMENT OF PATIENT INDEX DATA (RAPID3)
FATIGUE SCORE: 0
PATIENT GLOBAL ASSESSMENT SCORE: 3
TOTAL RAPID3 SCORE: 2.33
PSYCHOLOGICAL DISTRESS SCORE: 1.1
MDHAQ FUNCTION SCORE: .3
WHEN YOU AWAKENED IN THE MORNING OVER THE LAST WEEK, PLEASE INDICATE THE AMOUNT OF TIME IT TAKES UNTIL YOU ARE AS LIMBER AS YOU WILL BE FOR THE DAY: 30 MIN
PAIN SCORE: 3
AM STIFFNESS SCORE: 1, YES

## 2017-08-15 LAB — CRP SERPL-MCNC: 6 MG/L

## 2017-08-15 NOTE — PROGRESS NOTES
History of present illness: 62-year-old female with osteoarthritis, especially of the knees.  On top of that she developed an intermittent inflammatory arthritis.  Joint scan showed evidence of persistent inflammation.  I diagnosed her as having seronegative rheumatoid arthritis.  I started her on Plaquenil and February.  She  Developed a rash and I stopped the Plaquenil.  The rash resolved.  In May she was having pain in her right knee.  I gave her a cortisone injection.  This did help the knee pain but caused uterine bleeding.  This normally happens when she gets a cortisone injection.  2 weeks later she developed right sciatic pain.  She had been having some buttock pain at the time of her last visit.  She was seen in the emergency room.  She was placed on a short course of cortisone.  Her symptoms lasted about 6 weeks.  She is starting to feel better.  She had been taking Advil only.  Now she has some pain in the right shoulder for the past month.  She denies any swelling.  She had no history of antecedent trauma.  The pain is bad in the morning.  It does increase with activity.  It does wake her up at night.  She has been using topical medications with some response.  She has not tried heat or ice.  The pain does radiate down the arm.  It occasionally goes up into the neck.  She has no similar problem on the left arm.  She has had no joint swelling.  She has had no other recent medical problems.    Physical examination:  Musculoskeletal: She has decreased range of motion of the cervical spine.  She has pain on range of motion the right shoulder.  She is tender over the subacromial bursa.  She has no swelling.  Left shoulder is unremarkable.  Elbows, wrists, small joints of the hands are unremarkable.  Lumbar spine has good range of motion without pain on range of motion.  Hips are within normal limits.  Straight leg raising is negative.  She has bony hypertrophy of the right knee.  It is tender.  She has no  effusion.  She has some pain on range of motion.  Left knee is unremarkable.  She has 1+ edema of the right lower leg.  She has tenderness of the right ankle but no swelling.  Left ankle is unremarkable.  Small joints of feet are within normal limits.    Assessment:  1.  Her shoulder problem seems to be subacromial bursitis  2.  I feel the majority of her problem is osteoarthritis.  I do not find definite evidence of recurrence of her inflammatory arthritis.    Plans:  1.  I offered to inject the shoulder but she does not feel it is bad enough yet  2.  Continue Advil and topical medications as before  3.  Laboratory studies are obtained  4.  Return to see me in 4 months

## 2017-10-20 ENCOUNTER — TELEPHONE (OUTPATIENT)
Dept: FAMILY MEDICINE | Facility: CLINIC | Age: 62
End: 2017-10-20

## 2017-10-20 DIAGNOSIS — Z12.31 ENCOUNTER FOR SCREENING MAMMOGRAM FOR MALIGNANT NEOPLASM OF BREAST: ICD-10-CM

## 2017-10-20 DIAGNOSIS — E78.5 HYPERLIPIDEMIA, UNSPECIFIED HYPERLIPIDEMIA TYPE: ICD-10-CM

## 2017-10-20 DIAGNOSIS — I10 ESSENTIAL HYPERTENSION: Primary | ICD-10-CM

## 2017-10-20 NOTE — TELEPHONE ENCOUNTER
----- Message from Homa Ferrara sent at 10/19/2017  4:21 PM CDT -----  Contact: self  Pt called requesting to schedule Physical Exam. Schedule is not open to schedule. Orders for mammogram (3D)  and labs are needed. Please contact pt at [phone].    Thanks-

## 2017-11-01 ENCOUNTER — HOSPITAL ENCOUNTER (OUTPATIENT)
Dept: RADIOLOGY | Facility: HOSPITAL | Age: 62
Discharge: HOME | End: 2017-11-01
Attending: NURSE PRACTITIONER
Payer: COMMERCIAL

## 2017-11-01 DIAGNOSIS — Z12.31 ENCOUNTER FOR SCREENING MAMMOGRAM FOR MALIGNANT NEOPLASM OF BREAST: ICD-10-CM

## 2017-11-01 PROCEDURE — 77067 SCR MAMMO BI INCL CAD: CPT | Mod: 26,,, | Performed by: STUDENT IN AN ORGANIZED HEALTH CARE EDUCATION/TRAINING PROGRAM

## 2017-11-01 PROCEDURE — 77063 BREAST TOMOSYNTHESIS BI: CPT | Mod: 26,,, | Performed by: STUDENT IN AN ORGANIZED HEALTH CARE EDUCATION/TRAINING PROGRAM

## 2017-11-01 PROCEDURE — 77067 SCR MAMMO BI INCL CAD: CPT | Mod: TC

## 2017-11-03 ENCOUNTER — LAB VISIT (OUTPATIENT)
Dept: LAB | Facility: HOSPITAL | Age: 62
End: 2017-11-03
Attending: NURSE PRACTITIONER
Payer: COMMERCIAL

## 2017-11-03 DIAGNOSIS — I10 ESSENTIAL HYPERTENSION: ICD-10-CM

## 2017-11-03 DIAGNOSIS — E78.5 HYPERLIPIDEMIA, UNSPECIFIED HYPERLIPIDEMIA TYPE: ICD-10-CM

## 2017-11-03 LAB
ALBUMIN SERPL BCP-MCNC: 3.3 G/DL
ALP SERPL-CCNC: 86 U/L
ALT SERPL W/O P-5'-P-CCNC: 20 U/L
ANION GAP SERPL CALC-SCNC: 9 MMOL/L
AST SERPL-CCNC: 17 U/L
BASOPHILS # BLD AUTO: 0.03 K/UL
BASOPHILS NFR BLD: 0.5 %
BILIRUB SERPL-MCNC: 0.6 MG/DL
BUN SERPL-MCNC: 17 MG/DL
CALCIUM SERPL-MCNC: 9.1 MG/DL
CHLORIDE SERPL-SCNC: 105 MMOL/L
CHOLEST SERPL-MCNC: 205 MG/DL
CHOLEST/HDLC SERPL: 3.9 {RATIO}
CO2 SERPL-SCNC: 26 MMOL/L
CREAT SERPL-MCNC: 0.8 MG/DL
DIFFERENTIAL METHOD: NORMAL
EOSINOPHIL # BLD AUTO: 0.2 K/UL
EOSINOPHIL NFR BLD: 3.9 %
ERYTHROCYTE [DISTWIDTH] IN BLOOD BY AUTOMATED COUNT: 13.2 %
EST. GFR  (AFRICAN AMERICAN): >60 ML/MIN/1.73 M^2
EST. GFR  (NON AFRICAN AMERICAN): >60 ML/MIN/1.73 M^2
GLUCOSE SERPL-MCNC: 100 MG/DL
HCT VFR BLD AUTO: 39.2 %
HDLC SERPL-MCNC: 52 MG/DL
HDLC SERPL: 25.4 %
HGB BLD-MCNC: 12.8 G/DL
IMM GRANULOCYTES # BLD AUTO: 0.02 K/UL
IMM GRANULOCYTES NFR BLD AUTO: 0.4 %
LDLC SERPL CALC-MCNC: 130.4 MG/DL
LYMPHOCYTES # BLD AUTO: 1.5 K/UL
LYMPHOCYTES NFR BLD: 25.9 %
MCH RBC QN AUTO: 30.4 PG
MCHC RBC AUTO-ENTMCNC: 32.7 G/DL
MCV RBC AUTO: 93 FL
MONOCYTES # BLD AUTO: 0.6 K/UL
MONOCYTES NFR BLD: 10.3 %
NEUTROPHILS # BLD AUTO: 3.3 K/UL
NEUTROPHILS NFR BLD: 59 %
NONHDLC SERPL-MCNC: 153 MG/DL
NRBC BLD-RTO: 0 /100 WBC
PLATELET # BLD AUTO: 255 K/UL
PMV BLD AUTO: 10 FL
POTASSIUM SERPL-SCNC: 4.1 MMOL/L
PROT SERPL-MCNC: 7.1 G/DL
RBC # BLD AUTO: 4.21 M/UL
SODIUM SERPL-SCNC: 140 MMOL/L
TRIGL SERPL-MCNC: 113 MG/DL
WBC # BLD AUTO: 5.64 K/UL

## 2017-11-03 PROCEDURE — 80053 COMPREHEN METABOLIC PANEL: CPT

## 2017-11-03 PROCEDURE — 36415 COLL VENOUS BLD VENIPUNCTURE: CPT | Mod: PO

## 2017-11-03 PROCEDURE — 85025 COMPLETE CBC W/AUTO DIFF WBC: CPT

## 2017-11-03 PROCEDURE — 80061 LIPID PANEL: CPT

## 2017-11-08 ENCOUNTER — OFFICE VISIT (OUTPATIENT)
Dept: FAMILY MEDICINE | Facility: CLINIC | Age: 62
End: 2017-11-08
Payer: COMMERCIAL

## 2017-11-08 VITALS
HEIGHT: 60 IN | RESPIRATION RATE: 18 BRPM | TEMPERATURE: 98 F | WEIGHT: 230.81 LBS | OXYGEN SATURATION: 97 % | HEART RATE: 86 BPM | BODY MASS INDEX: 45.31 KG/M2 | DIASTOLIC BLOOD PRESSURE: 68 MMHG | SYSTOLIC BLOOD PRESSURE: 128 MMHG

## 2017-11-08 DIAGNOSIS — M17.0 PRIMARY OSTEOARTHRITIS OF BOTH KNEES: ICD-10-CM

## 2017-11-08 DIAGNOSIS — I10 ESSENTIAL HYPERTENSION: ICD-10-CM

## 2017-11-08 DIAGNOSIS — J30.9 ALLERGIC RHINITIS, UNSPECIFIED CHRONICITY, UNSPECIFIED SEASONALITY, UNSPECIFIED TRIGGER: ICD-10-CM

## 2017-11-08 DIAGNOSIS — Z00.00 ROUTINE GENERAL MEDICAL EXAMINATION AT A HEALTH CARE FACILITY: Primary | ICD-10-CM

## 2017-11-08 DIAGNOSIS — E66.01 MORBID OBESITY WITH BMI OF 45.0-49.9, ADULT: ICD-10-CM

## 2017-11-08 DIAGNOSIS — K43.9 VENTRAL HERNIA WITHOUT OBSTRUCTION OR GANGRENE: ICD-10-CM

## 2017-11-08 DIAGNOSIS — G47.33 OSA (OBSTRUCTIVE SLEEP APNEA): ICD-10-CM

## 2017-11-08 DIAGNOSIS — Z23 NEED FOR IMMUNIZATION AGAINST INFLUENZA: ICD-10-CM

## 2017-11-08 DIAGNOSIS — E78.5 HYPERLIPIDEMIA, UNSPECIFIED HYPERLIPIDEMIA TYPE: ICD-10-CM

## 2017-11-08 PROCEDURE — 90471 IMMUNIZATION ADMIN: CPT | Mod: S$GLB,,, | Performed by: FAMILY MEDICINE

## 2017-11-08 PROCEDURE — 90686 IIV4 VACC NO PRSV 0.5 ML IM: CPT | Mod: S$GLB,,, | Performed by: FAMILY MEDICINE

## 2017-11-08 PROCEDURE — 99396 PREV VISIT EST AGE 40-64: CPT | Mod: 25,S$GLB,, | Performed by: FAMILY MEDICINE

## 2017-11-08 PROCEDURE — 99999 PR PBB SHADOW E&M-EST. PATIENT-LVL III: CPT | Mod: PBBFAC,,, | Performed by: FAMILY MEDICINE

## 2017-11-08 RX ORDER — FLUTICASONE PROPIONATE 50 MCG
SPRAY, SUSPENSION (ML) NASAL
Qty: 16 G | Refills: 5 | Status: SHIPPED | OUTPATIENT
Start: 2017-11-08 | End: 2018-03-02 | Stop reason: SDUPTHER

## 2017-11-08 NOTE — PROGRESS NOTES
Chief Complaint   Patient presents with    Annual Exam       HPI  Apoorva Fernandez is a 62 y.o. female with multiple medical diagnoses as listed in the medical history and problem list that presents for annual exam . She has concerns regarding an abdominal hernia she had repaired several years ago on her left abdomen. She has noticed a bulge on the right now that is non tender. She is concerned she has dislodged something on the left. No other complaints. She has chronic pains in her knees and does see Rheumatology for osteoarthritis.    PAST MEDICAL HISTORY:  Past Medical History:   Diagnosis Date    Allergic rhinitis     Allergy     Arthritis     Hyperlipidemia     Diet-controlled    Hypertension     Joint pain     Keloid cicatrix     Obesity     Osteoarthritis     Personal history of colonic polyps 2010    Adenomatous    Personal history of peptic ulcer disease     Umbilical hernia        PAST SURGICAL HISTORY:  Past Surgical History:   Procedure Laterality Date    Abdominal/umbilical hernia repair      removed small part of small bowel; mesh placed     SECTION, LOW TRANSVERSE      X2    CHOLECYSTECTOMY      Open laparoscopy for a ruptured ulcer         SOCIAL HISTORY:  Social History     Social History    Marital status:      Spouse name: N/A    Number of children: 2    Years of education: N/A     Occupational History          Social History Main Topics    Smoking status: Never Smoker    Smokeless tobacco: Never Used    Alcohol use 1.2 - 1.8 oz/week     2 - 3 Glasses of wine per week      Comment: Socially on the weekends    Drug use: No    Sexual activity: Yes     Partners: Male     Other Topics Concern    Not on file     Social History Narrative    No narrative on file       FAMILY HISTORY:  Family History   Problem Relation Age of Onset    Breast cancer Mother       at age 65    Cancer Father      Bladder    Glaucoma Father      Hypertension Father     Heart disease Father     Obesity Sister     Heart disease Brother     Sleep apnea Brother     Diabetes Neg Hx     Melanoma Neg Hx        ALLERGIES AND MEDICATIONS: updated and reviewed.  Review of patient's allergies indicates:   Allergen Reactions    Antihistamines - alkylamine Shortness Of Breath    Lipitor  [atorvastatin]      Other reaction(s): myalgia    Zetia  [ezetimibe]      Other reaction(s): muscle cramps     Current Outpatient Prescriptions   Medication Sig Dispense Refill    aspirin (ECOTRIN) 81 MG EC tablet Take 81 mg by mouth once daily.      fluticasone (FLONASE) 50 mcg/actuation nasal spray SPRAY 1 SPRAY INTO EACH NOSTRIL ONCE EVERY DAY 16 g 5    ibuprofen (ADVIL,MOTRIN) 200 MG tablet Take 800 mg by mouth every 6 (six) hours as needed for Pain.       lidocaine (LIDODERM) 5 % Place 1 patch onto the skin every 24 hours. Remove & Discard patch within 12 hours or as directed by MD      MULTIVITS-MIN/FA/CA CARB/VIT K (ONE-A-DAY WOMEN'S 50+ ORAL) Take 1 tablet by mouth once daily.      omega 3-dha-epa-fish oil 1,000 (120-180) mg Cap Take 1 capsule by mouth once daily.       No current facility-administered medications for this visit.        ROS  Review of Systems   Constitutional: Negative for activity change, chills, diaphoresis, fatigue, fever and unexpected weight change.   HENT: Negative for hearing loss, rhinorrhea, sinus pressure, sore throat, tinnitus and trouble swallowing.    Eyes: Negative for photophobia, discharge and visual disturbance.   Respiratory: Negative for cough, chest tightness, shortness of breath and wheezing.    Cardiovascular: Negative for chest pain and palpitations.   Gastrointestinal: Negative for abdominal pain, blood in stool, constipation, diarrhea, nausea and vomiting.   Endocrine: Negative for polydipsia and polyuria.   Genitourinary: Negative for difficulty urinating, dysuria, flank pain, frequency, hematuria, menstrual problem and  vaginal discharge.   Musculoskeletal: Positive for arthralgias and joint swelling. Negative for neck pain.   Skin: Negative for rash.   Neurological: Negative for speech difficulty, weakness, light-headedness and headaches.   Psychiatric/Behavioral: Negative for behavioral problems, confusion and dysphoric mood.       Physical Exam  Vitals:    11/08/17 0935   BP: 128/68   Pulse: 86   Resp: 18   Temp: 97.7 °F (36.5 °C)   TempSrc: Oral   SpO2: 97%   Weight: 104.7 kg (230 lb 13.2 oz)   Height: 5' (1.524 m)    Body mass index is 45.08 kg/m².  Weight: 104.7 kg (230 lb 13.2 oz)   Height: 5' (152.4 cm)     Physical Exam   Constitutional: She is oriented to person, place, and time. She appears well-developed and well-nourished. No distress.   HENT:   Head: Normocephalic and atraumatic.   Right Ear: Tympanic membrane normal.   Left Ear: Tympanic membrane normal.   Nose: Nose normal.   Mouth/Throat: No oropharyngeal exudate.   Eyes: EOM are normal.   Neck: Neck supple. No thyromegaly present.   Cardiovascular: Normal rate and regular rhythm.  Exam reveals no gallop and no friction rub.    No murmur heard.  Pulmonary/Chest: Effort normal and breath sounds normal. No respiratory distress. She has no wheezes. She has no rales.   Abdominal: Soft. Bowel sounds are normal. She exhibits no distension and no mass. There is no tenderness. There is no rebound and no guarding. A hernia is present.       Lymphadenopathy:     She has no cervical adenopathy.   Neurological: She is alert and oriented to person, place, and time.   Skin: Skin is warm and dry. No rash noted.   Psychiatric: She has a normal mood and affect. Her behavior is normal.   Nursing note and vitals reviewed.      Health Maintenance       Date Due Completion Date    Zoster Vaccine 01/08/2014 ---    Influenza Vaccine 08/01/2017 11/28/2016    Pap Smear with HPV Cotest 06/29/2018 6/29/2015    Override on 8/9/2011: Done    TETANUS VACCINE 07/15/2018 7/15/2008    Mammogram  11/01/2018 11/1/2017    Colonoscopy 11/25/2018 11/25/2013    Override on 8/3/2010: Done    Lipid Panel 11/03/2022 11/3/2017            ASSESSMENT     1. Routine general medical examination at a health care facility    2. Allergic rhinitis, unspecified chronicity, unspecified seasonality, unspecified trigger    3. Essential hypertension    4. Hyperlipidemia, unspecified hyperlipidemia type    5. Morbid obesity with BMI of 45.0-49.9, adult    6. Primary osteoarthritis of both knees    7. Need for immunization against influenza    8. BELLA (obstructive sleep apnea)    9. Ventral hernia without obstruction or gangrene        PLAN:     Problem List Items Addressed This Visit        Cardiac/Vascular    Hyperlipidemia    Overview     Diet-controlled  - could not tolerate lipitor and zetia         HTN (hypertension)  -This is a chronic medical condition that is stable under the current regimen. Continue to monitor and we will make medication adjustments as needed.          Endocrine    Morbid obesity with BMI of 45.0-49.9, adult  -discussed exercise and the need for weight loss, which will help with her pains       Orthopedic    Primary osteoarthritis of both knees  -continue follow up with Rheumatology, recommend exercise    Overview               Other    Allergic rhinitis  -stable, will refill    Relevant Medications    fluticasone (FLONASE) 50 mcg/actuation nasal spray    BELLA (obstructive sleep apnea)  -she is not using her machine, she is advised to follow up with sleep medicine to begin use; she would like to think about as she is a mouth breather and would like to address her deviated septum      Other Visit Diagnoses     Routine general medical examination at a health care facility    -  Primary  --discussed healthy lifestyle modification with exercise and healthy diet, reviewed age appropriate screening and healthy maintenance      Need for immunization against influenza      -as ordered    Ventral hernia without  obstruction or gangrene      -discussed emergent signs of incarceration, which are not present, she would like to follow up with her PCP in 3 mos for re-evaluation, if pain begins or the hernia gets larger, we will refer to surgery            Verónica Monroy MD  11/08/2017 11:08 AM        Return in about 1 year (around 11/8/2018) for annual exam.

## 2017-12-18 ENCOUNTER — OFFICE VISIT (OUTPATIENT)
Dept: RHEUMATOLOGY | Facility: CLINIC | Age: 62
End: 2017-12-18
Payer: COMMERCIAL

## 2017-12-18 VITALS
SYSTOLIC BLOOD PRESSURE: 134 MMHG | WEIGHT: 232 LBS | DIASTOLIC BLOOD PRESSURE: 75 MMHG | HEART RATE: 73 BPM | HEIGHT: 60 IN | BODY MASS INDEX: 45.55 KG/M2

## 2017-12-18 DIAGNOSIS — M15.9 PRIMARY OSTEOARTHRITIS INVOLVING MULTIPLE JOINTS: Primary | ICD-10-CM

## 2017-12-18 PROCEDURE — 99213 OFFICE O/P EST LOW 20 MIN: CPT | Mod: S$GLB,,, | Performed by: INTERNAL MEDICINE

## 2017-12-18 PROCEDURE — 99999 PR PBB SHADOW E&M-EST. PATIENT-LVL III: CPT | Mod: PBBFAC,,, | Performed by: INTERNAL MEDICINE

## 2017-12-19 NOTE — PROGRESS NOTES
History of present illness: 62-year-old female with osteoarthritis of the knee and back.  She had developed an inflammatory arthritis diagnosed as seronegative rheumatoid arthritis.  She was treated briefly with Plaquenil but developed a rash.  She has had no further evidence of inflammatory arthritis since that time.  She still has problems with her osteoarthritis.  When she was seen in August she was complaining of pain in the right shoulder.  This pain has improved.  She has sciatic pain in the right leg.  It starts in the buttock and radiates down the posterior aspect of the leg.  She has some weakness in the leg.  She has no pain in the lower lumbar area.  She also has continued pain in the right knee.  She has no swelling in the knee.  She has no similar pain on the left side.  She has been taking ibuprofen 400 mg 3 times daily with some relief.  She has also been using a lidocaine patch.  Her blood pressure has been stable.  She has no other recent medical problems.    Physical examination:  Musculoskeletal: She has good range of motion of the shoulders without pain on range of motion.  Elbows, wrists, small joints of the hands have no synovitis.  She has tenderness of the right buttock and the right greater trochanter.  She has pain on lateral bending of the lumbar spine to the left which is referred to the right side.  Straight leg raising is negative.  Hips have good range of motion.  She has bony hypertrophy of the right knee with decreased range of motion of the knee.  She has no effusion.  She has tenderness to palpation.  Left knee is unremarkable.  Ankles and feet are within normal limits.    Assessment: At the present time she only has evidence of osteoarthritis.  She has no evidence of inflammatory arthritis at this point.    Plans:  1.  I told her she could take ibuprofen up to 2400 mg daily  2.  Use heat to the involved area  3.  Consider Synvisc injection in the future  4.  Return to see me in 6  months

## 2018-02-26 ENCOUNTER — PATIENT MESSAGE (OUTPATIENT)
Dept: FAMILY MEDICINE | Facility: CLINIC | Age: 63
End: 2018-02-26

## 2018-02-26 DIAGNOSIS — R10.9 ABDOMINAL PAIN, UNSPECIFIED ABDOMINAL LOCATION: Primary | ICD-10-CM

## 2018-02-28 ENCOUNTER — PATIENT MESSAGE (OUTPATIENT)
Dept: FAMILY MEDICINE | Facility: CLINIC | Age: 63
End: 2018-02-28

## 2018-03-01 ENCOUNTER — TELEPHONE (OUTPATIENT)
Dept: FAMILY MEDICINE | Facility: CLINIC | Age: 63
End: 2018-03-01

## 2018-03-02 DIAGNOSIS — J30.9 ALLERGIC RHINITIS, UNSPECIFIED CHRONICITY, UNSPECIFIED SEASONALITY, UNSPECIFIED TRIGGER: ICD-10-CM

## 2018-03-02 RX ORDER — FLUTICASONE PROPIONATE 50 MCG
SPRAY, SUSPENSION (ML) NASAL
Qty: 16 G | Refills: 5 | Status: SHIPPED | OUTPATIENT
Start: 2018-03-02 | End: 2018-11-28 | Stop reason: SDUPTHER

## 2018-03-06 ENCOUNTER — TELEPHONE (OUTPATIENT)
Dept: FAMILY MEDICINE | Facility: CLINIC | Age: 63
End: 2018-03-06

## 2018-04-16 ENCOUNTER — OFFICE VISIT (OUTPATIENT)
Dept: GASTROENTEROLOGY | Facility: CLINIC | Age: 63
End: 2018-04-16
Payer: COMMERCIAL

## 2018-04-16 VITALS
BODY MASS INDEX: 44.54 KG/M2 | HEART RATE: 66 BPM | SYSTOLIC BLOOD PRESSURE: 142 MMHG | DIASTOLIC BLOOD PRESSURE: 79 MMHG | HEIGHT: 60 IN | WEIGHT: 226.88 LBS

## 2018-04-16 DIAGNOSIS — K21.9 GASTROESOPHAGEAL REFLUX DISEASE, ESOPHAGITIS PRESENCE NOT SPECIFIED: ICD-10-CM

## 2018-04-16 DIAGNOSIS — K43.9 HERNIA OF ANTERIOR ABDOMINAL WALL: Primary | ICD-10-CM

## 2018-04-16 DIAGNOSIS — Z86.010 HISTORY OF ADENOMATOUS POLYP OF COLON: ICD-10-CM

## 2018-04-16 PROCEDURE — 99203 OFFICE O/P NEW LOW 30 MIN: CPT | Mod: S$GLB,,, | Performed by: INTERNAL MEDICINE

## 2018-04-16 PROCEDURE — 99999 PR PBB SHADOW E&M-EST. PATIENT-LVL III: CPT | Mod: PBBFAC,,, | Performed by: INTERNAL MEDICINE

## 2018-04-16 RX ORDER — FAMOTIDINE 20 MG/1
20 TABLET, FILM COATED ORAL 2 TIMES DAILY
COMMUNITY
End: 2024-01-20

## 2018-04-16 NOTE — PROGRESS NOTES
Subjective:      Patient ID: Apoorva Fernandez is a 63 y.o. female.    Chief Complaint: Abdominal Pain; Nausea; Bloated; and Gastroesophageal Reflux    HPI:   Patient 63-year-old female referred for GI evaluation.  Has a background of some indigestion and heartburn.  Indicates symptoms are fairly well controlled on Pepcid.  Occasionally has epigastric pain following meals.  Symptoms seem to improve when she discontinued and off inflammatories.  Has a background of serology negative rheumatoid arthritis.  Taking Tylenol.  Her main complaint today is concern over possible hernia.  She's had multiple abdominal surgeries.  Describes a perforated ulcer at age 27 requiring surgery.  Subsequent ventral hernia repairs ×2.  Mesh in place.  2 C-sections also.  Past GI history includes prior colon polyps.  Follow-up colonoscopy was negative 2013.  Follow-up is due in 2018.  Prior cholecystectomy.    Review of patient's allergies indicates:   Allergen Reactions    Antihistamines - alkylamine Shortness Of Breath    Lipitor  [atorvastatin]      Other reaction(s): myalgia    Zetia  [ezetimibe]      Other reaction(s): muscle cramps     Past Medical History:   Diagnosis Date    Allergic rhinitis     Allergy     Arthritis     Hyperlipidemia     Diet-controlled    Hypertension     Joint pain     Keloid cicatrix     Obesity     Osteoarthritis     Personal history of colonic polyps 2010    Adenomatous    Personal history of peptic ulcer disease     Umbilical hernia      Past Surgical History:   Procedure Laterality Date    Abdominal/umbilical hernia repair      removed small part of small bowel; mesh placed     SECTION, LOW TRANSVERSE      X2    CHOLECYSTECTOMY      Open laparoscopy for a ruptured ulcer       Family History   Problem Relation Age of Onset    Breast cancer Mother       at age 65    Cancer Father      Bladder    Glaucoma Father     Hypertension Father      Heart disease Father     Obesity Sister     Heart disease Brother     Sleep apnea Brother     Diabetes Neg Hx     Melanoma Neg Hx      Social History     Social History    Marital status:      Spouse name: N/A    Number of children: 2    Years of education: N/A     Occupational History          Social History Main Topics    Smoking status: Never Smoker    Smokeless tobacco: Never Used    Alcohol use 1.2 - 1.8 oz/week     2 - 3 Glasses of wine per week      Comment: Socially on the weekends    Drug use: No    Sexual activity: Yes     Partners: Male     Other Topics Concern    Not on file     Social History Narrative    No narrative on file       Review of Systems:  Constitutional: Negative for appetite change.   HENT: Negative for trouble swallowing.   Eyes: Negative for photophobia.   Respiratory: Negative for cough and shortness of breath.   Cardiovascular: Negative for palpitations.   Gastrointestinal: See HPI for details.  Genitourinary: Negative for frequency and hematuria.   Skin: Negative for rash.   Neurological: Negative for weakness and headaches.   Hematological: Negative.   Psychiatric/Behavioral: Negative for suicidal ideas and behavioral problems.     Objective:     BP (!) 142/79 (BP Location: Right arm, Patient Position: Sitting)   Pulse 66   Ht 5' (1.524 m)   Wt 102.9 kg (226 lb 13.7 oz)   LMP 08/22/2015 (Exact Date)   BMI 44.30 kg/m²     Physical Exam:  Alert no distress.  Obese  Eyes: Pupils are equal, round, and reactive to light.   Neck: Supple. No mass  Cardiovascular: Regular rhythm . No murmur   Pulmonary/Chest: Lungs clear   Abdominal: Soft.  Protuberant.  Extensive midline scar.  Subjective bulge, right periumbilical.  Does not feel like a complete herniation of the abdominal wall.                                      Nontender, no guarding. Positive bowel sounds   Musculoskeletal: No deformity. No edema.   Psychiatric: Alert and  oriented    Assessment:     1. Hernia of anterior abdominal wall    2. History of adenomatous polyp of colon    3. Gastroesophageal reflux disease, esophagitis presence not specified      Plan:     Apoorva was seen today for abdominal pain, nausea, bloated and gastroesophageal reflux.    Diagnoses and all orders for this visit:    Hernia of anterior abdominal wall  -     US Abdomen Complete; Future    History of adenomatous polyp of colon    Gastroesophageal reflux disease, esophagitis presence not specified     prior gastric surgery for ulcer disease    Plan:  Ultrasound with particular attention to the abdominal wall  Antireflux measures  Continue Pepcid  Consider shifting to a PPI if chronic NSAIDs are to be utilized.  Consider screening gastroscopy later this year.

## 2018-04-16 NOTE — PATIENT INSTRUCTIONS
Hernia (Adult)    A hernia can happen when there is a weakness or defect in the wall of the abdomen or groin. Intestines or nearby tissues may move from their usual location and push through the weakness in the wall. This can cause a hernia (bulge) you may see or feel.  Causes and Risk Factors   A hernia may be present at birth. Or it may be caused by the wear and tear of daily living. Certain factors can make a hernia more likely. These can include:  · Heavy lifting  · Straining, whether from lifting, movement, or constipation  · Chronic cough  · Injury to the abdominal wall  · Excess weight  · Pregnancy  · Prior surgery  · Older age  · Family history of hernia  Symptoms  Symptoms of a hernia may come on suddenly. Or they may appear slowly over time. Some common symptoms include:  · Bulge in the groin area, around the navel, or in the scrotum (the bulge may get bigger when you stand and go away when you lie down)  · Pain or pressure around the bulge  · Pain during activities such as lifting, coughing, or sneezing  · A feeling of weakness or pressure in the groin  · Pain or swelling in the scrotum  Types of hernias  There are different types of hernia. The type you have depends on its location:  · Inguinal: This type is in the groin or scrotum. It is more common in men.  · Femoral: This type is in the groin, upper thigh (where the leg bends), or labia. It is more common in women.  · Ventral: This type is in the abdominal wall.  · Umbilical: This type occurs around the navel (belly button).  · Incisional: This type occurs at the site of a previous surgery.  The condition of the hernia can help determine how urgently it needs to be treated.  · Reducible: It goes back in by itself, or it can be pushed back in.  · Irreducible: It cant be pushed back in.  · Incarcerated/Strangulated: The intestine is trapped (incarcerated). If this happens, you wont be able to push the bulge back in. If the incarcerated hernia isnt  treated, it may become strangulated. This means the area loses blood supply and the tissue may die. This requires emergency surgery! Treatment is needed right away!  In most cases, a hernia will not heal on its own. Surgery is usually needed to repair the defect in the abdominal wall or groin. Youll be told more about surgery, if needed.  If your symptoms are not severe, treatment may sometimes be delayed. In such cases, regular follow-up visits with the provider will be needed. Youll be asked to keep track of your symptoms and to watch for signs of more serious problems. You may also be given guidelines similar to the home care instructions below.  Home Care  To help keep a hernia from getting worse, you may be advised to:  · Avoid heavy lifting and straining as directed.  · Take steps to prevent constipation, such as eating more fiber and drinking more water. This may help reduce straining that can occur when having a bowel movement. Reducing straining may help keep your symptoms from getting worse.  · Maintain a healthy weight or lose excess weight. This can help reduce strain on abdominal muscles and tissues.  · Stop smoking. This can help prevent coughing that may also strain abdominal muscles and tissues.  Follow-up care  Follow up with your healthcare provider, or as directed. If imaging tests were done, they will be reviewed a doctor. You will be told the results and any new findings that may affect your care.  When to seek medical advice  Call your healthcare provider right away if any of these occur:  · Hernia hardens, swells, or grows larger  · Hernia can no longer be pushed back in  · Pain moves to the lower right abdomen (just below the waistline), or spreads to the back  Call 911  Call 911 right away if any of these occur:  · Nausea and vomiting  · Severe pain, redness, or tenderness in the area near the hernia  · Pain worsens quickly and doesnt get better  · Inability to have a bowel movement or  pass gas  · Fever of 100.4°F (38°C) or higher  · Trouble breathing  · Fainting  · Rapid heart rate  · Vomiting blood  · Large amounts of blood in stool  Date Last Reviewed: 6/9/2015 © 2000-2017 [employer]. [address] [place] [address], [place] [address]. All rights reserved. This information is not intended as a substitute for professional medical care. Always follow your healthcare professional's instructions.        GERD (Adult)    The esophagus is a tube that carries food from the mouth to the stomach. A valve at the lower end of the esophagus prevents stomach acid from flowing upward. When this valve doesn't work properly, stomach contents may repeatedly flow back up (reflux) into the esophagus. This is called gastroesophageal reflux disease (GERD). GERD can irritate the esophagus. It can cause problems with swallowing or breathing. In severe cases, GERD can cause recurrent pneumonia or other serious problems.  Symptoms of reflux include burning, pressure or sharp pain in the upper abdomen or mid to lower chest. The pain can spread to the neck, back, or shoulder. There may be belching, an acid taste in the back of the throat, chronic cough, or sore throat or hoarseness. GERD symptoms often occur during the day after a big meal. They can also occur at night when lying down.   Home care  Lifestyle changes can help reduce symptoms. If needed, medicines may be prescribed. Symptoms often improve with treatment, but if treatment is stopped, the symptoms often return after a few months. So most persons with GERD will need to continue treatment.  Lifestyle changes  · Limit or avoid fatty, fried, and spicy foods, as well as coffee, chocolate, mint, and foods with high acid content such as tomatoes and citrus fruit and juices (orange, grapefruit, lemon).  · Dont eat large meals, especially at night. Frequent, smaller meals are best. Do not lie down right after eating. And dont eat anything 3 hours before going  "to bed.  · Avoid drinking alcohol and smoking. As much as possible, stay away from second hand smoke.  · If you are overweight, losing weight will reduce symptoms.   · Avoid wearing tight clothing around your stomach area.  · If your symptoms occur during sleep, use a foam wedge to elevate your upper body (not just your head.) Or, place 4" blocks under the head of your bed.  Medicines  If needed, medicines can help relieve the symptoms of GERD and prevent damage to the esophagus. Discuss a medicine plan with your healthcare provider. This may include one or more of the following medicines:  · Antacids to help neutralize the normal acids in your stomach.  · Acid blockers (H2 blockers) to decrease acid production.  · Acid inhibitors (PPIs) to decrease acid production in a different way than the blockers. They may work better, but can take a little longer to take effect.  Take an antacid 30-60 minutes after eating and at bedtime, but not at the same time as an acid blocker.  Try not to take medicines such as ibuprofen and aspirin. If you are taking aspirin for your heart or other medical reasons, talk to your healthcare provider about stopping it.  Follow-up care  Follow up with your healthcare provider or as advised by our staff.  When to seek medical advice  Call your healthcare provider if any of the following occur:  · Stomach pain gets worse or moves to the lower right abdomen (appendix area)  · Chest pain appears or gets worse, or spreads to the back, neck, shoulder, or arm  · Frequent vomiting (cant keep down liquids)  · Blood in the stool or vomit (red or black in color)  · Feeling weak or dizzy  · Fever of 100.4ºF (38ºC) or higher, or as directed by your healthcare provider  Date Last Reviewed: 6/23/2015  © 7445-7905 [employer]. [address] [place] [address], [place] [address]. All rights reserved. This information is not intended as a substitute for professional medical care. Always follow your " healthcare professional's instructions.

## 2018-04-19 ENCOUNTER — HOSPITAL ENCOUNTER (OUTPATIENT)
Dept: RADIOLOGY | Facility: HOSPITAL | Age: 63
Discharge: HOME | End: 2018-04-19
Attending: INTERNAL MEDICINE
Payer: COMMERCIAL

## 2018-04-19 DIAGNOSIS — K43.9 HERNIA OF ANTERIOR ABDOMINAL WALL: ICD-10-CM

## 2018-04-19 PROCEDURE — 76705 ECHO EXAM OF ABDOMEN: CPT | Mod: TC

## 2018-04-19 PROCEDURE — 76705 ECHO EXAM OF ABDOMEN: CPT | Mod: 26,,, | Performed by: RADIOLOGY

## 2018-04-23 ENCOUNTER — TELEPHONE (OUTPATIENT)
Dept: GASTROENTEROLOGY | Facility: CLINIC | Age: 63
End: 2018-04-23

## 2018-04-23 NOTE — TELEPHONE ENCOUNTER
----- Message from Johnny Lewis Jr., MD sent at 4/19/2018  2:05 PM CDT -----  Ultrasound confirms ventral abdominal wall hernia.  Please arrange a referral to surgery clinic.  Notify patient.

## 2018-06-11 ENCOUNTER — OFFICE VISIT (OUTPATIENT)
Dept: SURGERY | Facility: CLINIC | Age: 63
End: 2018-06-11
Payer: COMMERCIAL

## 2018-06-11 VITALS
HEIGHT: 60 IN | HEART RATE: 65 BPM | SYSTOLIC BLOOD PRESSURE: 156 MMHG | DIASTOLIC BLOOD PRESSURE: 72 MMHG | BODY MASS INDEX: 41.71 KG/M2 | WEIGHT: 212.44 LBS | TEMPERATURE: 98 F

## 2018-06-11 DIAGNOSIS — K43.2 INCISIONAL HERNIA, WITHOUT OBSTRUCTION OR GANGRENE: Primary | ICD-10-CM

## 2018-06-11 PROCEDURE — 3077F SYST BP >= 140 MM HG: CPT | Mod: CPTII,S$GLB,, | Performed by: SURGERY

## 2018-06-11 PROCEDURE — 99203 OFFICE O/P NEW LOW 30 MIN: CPT | Mod: S$GLB,,, | Performed by: SURGERY

## 2018-06-11 PROCEDURE — 3078F DIAST BP <80 MM HG: CPT | Mod: CPTII,S$GLB,, | Performed by: SURGERY

## 2018-06-11 PROCEDURE — 99999 PR PBB SHADOW E&M-EST. PATIENT-LVL III: CPT | Mod: PBBFAC,,, | Performed by: SURGERY

## 2018-06-11 PROCEDURE — 3008F BODY MASS INDEX DOCD: CPT | Mod: CPTII,S$GLB,, | Performed by: SURGERY

## 2018-06-11 NOTE — PROGRESS NOTES
History & Physical    SUBJECTIVE:     History of Present Illness:  Patient is a 63 y.o. female presents with incisional hernia. Onset of symptoms was gradual starting several months ago with gradually worsening course since that time. Patient denies obstructive symptoms. Symptoms are aggravated by activity. Symptoms improve with rest.      Chief Complaint   Patient presents with    Hernia       Review of patient's allergies indicates:   Allergen Reactions    Antihistamines - alkylamine Shortness Of Breath    Lipitor  [atorvastatin]      Other reaction(s): myalgia    Zetia  [ezetimibe]      Other reaction(s): muscle cramps       Current Outpatient Prescriptions   Medication Sig Dispense Refill    aspirin (ECOTRIN) 81 MG EC tablet Take 81 mg by mouth once daily.      famotidine (PEPCID) 20 MG tablet Take 20 mg by mouth 2 (two) times daily.      fluticasone (FLONASE) 50 mcg/actuation nasal spray SPRAY 1 SPRAY INTO EACH NOSTRIL ONCE EVERY DAY 16 g 5    lidocaine (LIDODERM) 5 % Place 1 patch onto the skin every 24 hours. Remove & Discard patch within 12 hours or as directed by MD      MULTIVITS-MIN/FA/CA CARB/VIT K (ONE-A-DAY WOMEN'S 50+ ORAL) Take 1 tablet by mouth once daily.      omega 3-dha-epa-fish oil 1,000 (120-180) mg Cap Take 1 capsule by mouth once daily.      ibuprofen (ADVIL,MOTRIN) 200 MG tablet Take 800 mg by mouth every 6 (six) hours as needed for Pain.        No current facility-administered medications for this visit.        Past Medical History:   Diagnosis Date    Allergic rhinitis     Allergy     Arthritis     Hyperlipidemia     Diet-controlled    Hypertension     Joint pain     Keloid cicatrix     Obesity     Osteoarthritis     Personal history of colonic polyps August 2010    Adenomatous    Personal history of peptic ulcer disease     Umbilical hernia      Past Surgical History:   Procedure Laterality Date    Abdominal/umbilical hernia repair      removed small part of small  bowel; mesh placed     SECTION, LOW TRANSVERSE      X2    CHOLECYSTECTOMY      Open laparoscopy for a ruptured ulcer       Family History   Problem Relation Age of Onset    Breast cancer Mother          at age 65    Cancer Father         Bladder    Glaucoma Father     Hypertension Father     Heart disease Father     Obesity Sister     Heart disease Brother     Sleep apnea Brother     Diabetes Neg Hx     Melanoma Neg Hx      Social History   Substance Use Topics    Smoking status: Never Smoker    Smokeless tobacco: Never Used    Alcohol use 1.2 - 1.8 oz/week     2 - 3 Glasses of wine per week      Comment: Socially on the weekends        Review of Systems:      I have reviewed the Patient Summary Reports.        Review of Systems  Anesthesia Hx:  No problems with previous Anesthesia    Hematology/Oncology:  Hematology Normal   Oncology Normal     Cardiovascular:   Hypertension    Renal/:  Renal/ Normal     Musculoskeletal:   Arthritis     Neurological:  Neurology Normal    Psych:  Psychiatric Normal           OBJECTIVE:     Vital Signs (Most Recent)  Temp: 97.8 °F (36.6 °C) (18)  Pulse: 65 (18)  BP: (!) 156/72 (18)  5' (1.524 m)  96.3 kg (212 lb 6.6 oz)     Physical Exam:    Physical Exam  General:  Well nourished, Obesity    Airway/Jaw/Neck:  AIRWAY FINDINGS: Normal       Dental:  DENTAL FINDINGS: Normal   Chest/Lungs:  Chest/Lungs Clear    Heart/Vascular:  Heart Findings: Normal Heart murmur: negative    Abdomen:  Abdomen Findings:  periumbillical hernia recurrence     Musculoskeletal:  Musculoskeletal Findings: Normal   Skin:  Skin Findings: Normal    Mental Status:  Mental Status Findings: Normal        Laboratory  none    Diagnostic Results:  none    ASSESSMENT/PLAN:     At least one area of hernia recurrence    PLAN:Plan     CT abdomen

## 2018-06-14 DIAGNOSIS — R10.9 ABDOMINAL PAIN, UNSPECIFIED ABDOMINAL LOCATION: ICD-10-CM

## 2018-06-14 DIAGNOSIS — K43.9 VENTRAL HERNIA WITHOUT OBSTRUCTION OR GANGRENE: Primary | ICD-10-CM

## 2018-06-16 ENCOUNTER — HOSPITAL ENCOUNTER (OUTPATIENT)
Dept: RADIOLOGY | Facility: HOSPITAL | Age: 63
Discharge: HOME | End: 2018-06-16
Attending: SURGERY
Payer: COMMERCIAL

## 2018-06-16 DIAGNOSIS — R10.9 ABDOMINAL PAIN, UNSPECIFIED ABDOMINAL LOCATION: ICD-10-CM

## 2018-06-16 DIAGNOSIS — K43.9 VENTRAL HERNIA WITHOUT OBSTRUCTION OR GANGRENE: ICD-10-CM

## 2018-06-16 PROCEDURE — 74176 CT ABD & PELVIS W/O CONTRAST: CPT | Mod: 26,,, | Performed by: RADIOLOGY

## 2018-06-16 PROCEDURE — 74176 CT ABD & PELVIS W/O CONTRAST: CPT | Mod: TC

## 2018-06-16 PROCEDURE — 25500020 PHARM REV CODE 255: Performed by: SURGERY

## 2018-06-16 RX ADMIN — IOHEXOL 15 ML: 350 INJECTION, SOLUTION INTRAVENOUS at 11:06

## 2018-06-18 ENCOUNTER — TELEPHONE (OUTPATIENT)
Dept: SURGERY | Facility: CLINIC | Age: 63
End: 2018-06-18

## 2018-06-18 NOTE — TELEPHONE ENCOUNTER
----- Message from Reuben Sorensen sent at 6/18/2018  1:55 PM CDT -----  Contact: Pt  Pt would like to be called back asap regarding test results.    Pt can be reached at [phone].    Thanks

## 2018-06-18 NOTE — TELEPHONE ENCOUNTER
Spoke with patient regarding her recent CT Scan. She reviewed the results on [facility] and is aware that she has multiple abdominal wall hernias that contain loops of bowel.  She has occasional abdominal pain and nausea but no vomiting or constipation and she is passing gas.  Appt scheduled to discuss the CT results for 6/27/18 at 2:15pm.  S/S of obstruction reviewed including severe abdominal pain, nausea/vomiting, constipation, and inability to pass gas.  She verbalized understanding, is agreeable with this plan of care, and is aware of appt date and time.

## 2018-06-27 ENCOUNTER — HOSPITAL ENCOUNTER (OUTPATIENT)
Dept: CARDIOLOGY | Facility: CLINIC | Age: 63
Discharge: HOME | End: 2018-06-27
Attending: SURGERY
Payer: COMMERCIAL

## 2018-06-27 ENCOUNTER — OFFICE VISIT (OUTPATIENT)
Dept: SURGERY | Facility: CLINIC | Age: 63
End: 2018-06-27
Payer: COMMERCIAL

## 2018-06-27 ENCOUNTER — LAB VISIT (OUTPATIENT)
Dept: LAB | Facility: HOSPITAL | Age: 63
End: 2018-06-27
Attending: SURGERY
Payer: COMMERCIAL

## 2018-06-27 VITALS
SYSTOLIC BLOOD PRESSURE: 149 MMHG | WEIGHT: 212 LBS | HEART RATE: 56 BPM | TEMPERATURE: 98 F | HEIGHT: 60 IN | DIASTOLIC BLOOD PRESSURE: 77 MMHG | BODY MASS INDEX: 41.62 KG/M2

## 2018-06-27 DIAGNOSIS — K43.2 INCISIONAL HERNIA, WITHOUT OBSTRUCTION OR GANGRENE: ICD-10-CM

## 2018-06-27 DIAGNOSIS — K43.2 INCISIONAL HERNIA, WITHOUT OBSTRUCTION OR GANGRENE: Primary | ICD-10-CM

## 2018-06-27 LAB
ANION GAP SERPL CALC-SCNC: 9 MMOL/L
BASOPHILS # BLD AUTO: 0.03 K/UL
BASOPHILS NFR BLD: 0.6 %
BUN SERPL-MCNC: 14 MG/DL
CALCIUM SERPL-MCNC: 9.5 MG/DL
CHLORIDE SERPL-SCNC: 106 MMOL/L
CO2 SERPL-SCNC: 25 MMOL/L
CREAT SERPL-MCNC: 0.8 MG/DL
DIFFERENTIAL METHOD: NORMAL
EOSINOPHIL # BLD AUTO: 0.2 K/UL
EOSINOPHIL NFR BLD: 4 %
ERYTHROCYTE [DISTWIDTH] IN BLOOD BY AUTOMATED COUNT: 12.9 %
EST. GFR  (AFRICAN AMERICAN): >60 ML/MIN/1.73 M^2
EST. GFR  (NON AFRICAN AMERICAN): >60 ML/MIN/1.73 M^2
GLUCOSE SERPL-MCNC: 97 MG/DL
HCT VFR BLD AUTO: 40.7 %
HGB BLD-MCNC: 13.3 G/DL
IMM GRANULOCYTES # BLD AUTO: 0.02 K/UL
IMM GRANULOCYTES NFR BLD AUTO: 0.4 %
LYMPHOCYTES # BLD AUTO: 1.7 K/UL
LYMPHOCYTES NFR BLD: 33.4 %
MCH RBC QN AUTO: 30.5 PG
MCHC RBC AUTO-ENTMCNC: 32.7 G/DL
MCV RBC AUTO: 93 FL
MONOCYTES # BLD AUTO: 0.4 K/UL
MONOCYTES NFR BLD: 8.9 %
NEUTROPHILS # BLD AUTO: 2.6 K/UL
NEUTROPHILS NFR BLD: 52.7 %
NRBC BLD-RTO: 0 /100 WBC
PLATELET # BLD AUTO: 258 K/UL
PMV BLD AUTO: 9.7 FL
POTASSIUM SERPL-SCNC: 4.3 MMOL/L
RBC # BLD AUTO: 4.36 M/UL
SODIUM SERPL-SCNC: 140 MMOL/L
WBC # BLD AUTO: 4.97 K/UL

## 2018-06-27 PROCEDURE — 36415 COLL VENOUS BLD VENIPUNCTURE: CPT

## 2018-06-27 PROCEDURE — 3078F DIAST BP <80 MM HG: CPT | Mod: CPTII,S$GLB,, | Performed by: SURGERY

## 2018-06-27 PROCEDURE — 93000 ELECTROCARDIOGRAM COMPLETE: CPT | Mod: S$GLB,,, | Performed by: INTERNAL MEDICINE

## 2018-06-27 PROCEDURE — 80048 BASIC METABOLIC PNL TOTAL CA: CPT

## 2018-06-27 PROCEDURE — 3077F SYST BP >= 140 MM HG: CPT | Mod: CPTII,S$GLB,, | Performed by: SURGERY

## 2018-06-27 PROCEDURE — 99213 OFFICE O/P EST LOW 20 MIN: CPT | Mod: S$GLB,,, | Performed by: SURGERY

## 2018-06-27 PROCEDURE — 99999 PR PBB SHADOW E&M-EST. PATIENT-LVL III: CPT | Mod: PBBFAC,,, | Performed by: SURGERY

## 2018-06-27 PROCEDURE — 85025 COMPLETE CBC W/AUTO DIFF WBC: CPT

## 2018-06-27 PROCEDURE — 3008F BODY MASS INDEX DOCD: CPT | Mod: CPTII,S$GLB,, | Performed by: SURGERY

## 2018-06-27 NOTE — PATIENT INSTRUCTIONS
What Is a Hernia?    A hernia is when an organ or tissue pushes through a weak area in the belly (abdominal) wall. This weak area may be present at birth. Or it may be caused by abdominal strain over time. If not treated, a hernia can get worse with time and physical stress.  When a bulge forms  When there is a weak area in the abdominal wall, an organ or tissue can push outward. This often causes a bulge that you can see under your skin. The bulge may get bigger when you stand up. It may go away when you lie down. You may also feel some pressure or mild pain when lifting, coughing, urinating, or doing other activities.  Types of hernias  The type of hernia you have depends on its location. Most hernias form in the groin at or near the internal ring. This is the entrance to a canal between the abdomen and groin. Hernias can also occur in the abdomen, thigh, or genitals.  · An incisional hernia occurs at the site of a previous surgical incision.  · An umbilical hernia occurs at the navel.  · An indirect inguinal hernia occurs in the groin at the internal ring.  · A direct inguinal hernia occurs in the groin near the internal ring.  · A femoral hernia occurs just below the groin.  · An epigastric hernia occurs in the upper abdomen at the midline.  Diagnosis  In most cases, your healthcare provider can diagnose a hernia by doing a physical exam.  In some cases it might not be clear why you have a swelling in the belly wall. Then your provider may order an imaging test such as an ultrasound. This can help with the diagnosis.  Surgery  A hernia will not heal on its own. Surgery is needed to fix the weak spot in the abdominal wall. If not treated, a hernia can get larger. It can also cause serious health problems. The good news is that hernia surgery can be done quickly and safely. In some cases, you can go home the same day as your surgery.   When to call your provider  Call your healthcare provider right away if the  swelling around your hernia becomes larger, firmer, or more painful. These may be signs that your intestines are stuck in the abdominal wall. This is an emergency. The hernia must be repaired right away to avoid serious problems.  Date Last Reviewed: 7/1/2016  © 3943-7514 [employer]. [address] [place] [address]. All rights reserved. This information is not intended as a substitute for professional medical care. Always follow your healthcare professional's instructions.        How a Hernia Develops  Although a hernia bulge may appear suddenly, hernias often take years to develop. They grow larger as pressure inside the body presses the intestines or other tissues out through a weak area in the abdominal wall, often at the belly button or a site of previous surgery. With time, these tissues can bulge out beneath the skin.  Stages of hernia development    The wall weakens or tears. The abdominal lining bulges out through a weak area and begins to form a hernia sac. The sac may contain fat, intestine, or other tissues. At this point, the hernia may or may not cause a visible bulge.        The intestine pushes into the sac. As the intestine pushes further into the sac, it forms a visible bulge. The bulge may flatten when you lie down or push against it. This is called a reducible hernia and does not cause any immediate danger.             The intestine may become trapped. The sac containing the intestine may become trapped by muscle (incarcerated). If this happens, you wont be able to flatten the bulge. You may also have pain. Prompt treatment is needed.        The intestine may become strangulated. If the intestine is tightly trapped, it becomes strangulated. The strangulated area loses blood supply and may die. This can cause severe pain and block the intestine. Emergency surgery is needed.   Date Last Reviewed: 8/1/2016  © 3719-7515 [employer]. [address] [place] [address],  [place] [address]. All rights reserved. This information is not intended as a substitute for professional medical care. Always follow your healthcare professional's instructions.        Having Hernia Surgery: Patch Repair  Surgery treats a hernia by repairing the weakness in the abdominal wall. An incision is made so the surgeon has a direct view of the hernia. The repair is then done through this incision (open surgery). To repair the defect, special mesh materials are used to patch the weak area and make a tension-free repair. Follow your healthcare providers advice on how to get ready for the procedure. You can usually go home the same day as your surgery. In some cases, though, you may need to stay in the hospital overnight.  Getting ready for surgery  Your healthcare provider will talk with you about preparing for surgery. Follow all the instructions youre given and be sure to:   · Tell your healthcare provider about any medicines, supplements, or herbs you take. This includes both prescription and over-the-counter items.  · Stop taking aspirin, ibuprofen, naproxen and other NSAIDs as directed.  · Arrange for an adult family member or friend to give you a ride home after surgery.  · Stop smoking. Smoking affects blood flow and can slow healing.  · Gently wash the surgical area the night before surgery.  · Follow any directions you are given for not eating or drinking before surgery.     Repair in Front           Repair in Back           Combination Repair      The day of surgery  Arrive at the hospital or surgical center at your scheduled time. Youll be asked to change into a patient gown. Youll then be given an IV to provide fluids and medicine. Shortly before surgery, an anesthesiologist or nurse anesthetist will talk with you. He or she will explain the types of anesthesia used to prevent pain during surgery. You will have one or more of the following:  · Monitored sedation to make you relaxed and  sleepy.  · Local anesthesia to numb the surgical site.  · Regional anesthesia to numb specific areas of your body.  · General anesthesia to let you sleep during surgery.  During the surgery  Most hernias are treated using tension-free repairs. This is surgery that uses special mesh materials to repair the weak area. Unlike traditional repairs, the abdominal fascia (tissue around the muscle that gives strength to the abdominal wall) isnt sewn together. Instead, the mesh covers the weak area like a patch. This repairs the defect without tension on the muscles. It also makes it less likely to happen again. The mesh is made of strong, flexible plastic that stays in the body. Over time, nearby tissues grow into the mesh to strengthen the repair.  After surgery  When the procedure is over, youll be taken to the recovery area to rest. Your blood pressure and heart rate will be monitored. Youll also have a bandage over the surgical site. To help reduce discomfort, youll be given pain medicines. You may also be given breathing exercises to keep your lungs clear. Later, youll be asked to get up and walk. This helps prevent blood clots in the legs. You can go home when your healthcare provider says youre ready.     Risks and complications  Hernia surgery is safe, but does have risks including:  · Bleeding  · Infection  · Anesthesia risks  · Mesh complications  · Inability to urinate   · Bowel or bladder injury   · Numbness or pain in the groin or leg  · Risk the hernia will happen again  · Damage to the testicles or testicular function      Date Last Reviewed: 10/1/2016  © 2176-6476 [employer]. [address] [place] [address], [place] [address]. All rights reserved. This information is not intended as a substitute for professional medical care. Always follow your healthcare professional's instructions.

## 2018-06-27 NOTE — PROGRESS NOTES
History & Physical    SUBJECTIVE:     History of Present Illness:  Patient is a 63 y.o. female presents with symptomatic incisional hernia x 2. Onset of symptoms was gradual starting several months ago with gradually worsening course since that time. Patient denies obstructive symptoms. Symptoms are aggravated by activity. Symptoms improve with rest.      Chief Complaint   Patient presents with    Follow-up       Review of patient's allergies indicates:   Allergen Reactions    Antihistamines - alkylamine Shortness Of Breath    Lipitor  [atorvastatin]      Other reaction(s): myalgia    Zetia  [ezetimibe]      Other reaction(s): muscle cramps       Current Outpatient Prescriptions   Medication Sig Dispense Refill    aspirin (ECOTRIN) 81 MG EC tablet Take 81 mg by mouth once daily.      famotidine (PEPCID) 20 MG tablet Take 20 mg by mouth 2 (two) times daily.      fluticasone (FLONASE) 50 mcg/actuation nasal spray SPRAY 1 SPRAY INTO EACH NOSTRIL ONCE EVERY DAY 16 g 5    ibuprofen (ADVIL,MOTRIN) 200 MG tablet Take 800 mg by mouth every 6 (six) hours as needed for Pain.       lidocaine (LIDODERM) 5 % Place 1 patch onto the skin every 24 hours. Remove & Discard patch within 12 hours or as directed by MD      MULTIVITS-MIN/FA/CA CARB/VIT K (ONE-A-DAY WOMEN'S 50+ ORAL) Take 1 tablet by mouth once daily.      omega 3-dha-epa-fish oil 1,000 (120-180) mg Cap Take 1 capsule by mouth once daily.       No current facility-administered medications for this visit.        Past Medical History:   Diagnosis Date    Allergic rhinitis     Allergy     Arthritis     Hyperlipidemia     Diet-controlled    Hypertension     Joint pain     Keloid cicatrix     Obesity     Osteoarthritis     Personal history of colonic polyps August 2010    Adenomatous    Personal history of peptic ulcer disease     Umbilical hernia      Past Surgical History:   Procedure Laterality Date    Abdominal/umbilical hernia repair      removed  small part of small bowel; mesh placed     SECTION, LOW TRANSVERSE      X2    CHOLECYSTECTOMY      Open laparoscopy for a ruptured ulcer       Family History   Problem Relation Age of Onset    Breast cancer Mother          at age 65    Cancer Father         Bladder    Glaucoma Father     Hypertension Father     Heart disease Father     Obesity Sister     Heart disease Brother     Sleep apnea Brother     Diabetes Neg Hx     Melanoma Neg Hx      Social History   Substance Use Topics    Smoking status: Never Smoker    Smokeless tobacco: Never Used    Alcohol use 1.2 - 1.8 oz/week     2 - 3 Glasses of wine per week      Comment: Socially on the weekends        Review of Systems:           Review of Systems  Anesthesia Hx:  No problems with previous Anesthesia   Social:  Non-Smoker    Hematology/Oncology:  Hematology Normal   Oncology Normal     Cardiovascular:   Hypertension    Renal/:  Renal/ Normal     Hepatic/GI:  Hepatic/GI Normal    Musculoskeletal:   Arthritis     Endocrine:  Endocrine Normal    Dermatological:  Skin Normal    Psych:  Psychiatric Normal           OBJECTIVE:     Vital Signs (Most Recent)  Temp: 97.7 °F (36.5 °C) (18 1435)  Pulse: (!) 56 (18 1435)  BP: (!) 149/77 (18 1435)  5' (1.524 m)  96.2 kg (212 lb)     Physical Exam:    Physical Exam  General:  Obesity    Airway/Jaw/Neck:  AIRWAY FINDINGS: Normal      Eyes/Ears/Nose:  EYES/EARS/NOSE FINDINGS: Normal   Dental:  DENTAL FINDINGS: Normal   Chest/Lungs:  Chest/Lungs Clear    Heart/Vascular:  Heart Findings: Normal Heart murmur: negative Vascular Findings:     Abdomen:  Abdomen Findings:  Tenderness  At least two areas of reducible incisional hernia     Musculoskeletal:  Musculoskeletal Findings: Normal   Skin:  Skin Findings: Normal    Mental Status:  Mental Status Findings: Normal        Laboratory  none    Diagnostic Results:  CT: Reviewed  2 areas of incisional hernia with bowel      ASSESSMENT/PLAN:     Incisional hernia    PLAN:Plan     Repair with mesh   Open

## 2018-06-28 DIAGNOSIS — K43.2 INCISIONAL HERNIA, WITHOUT OBSTRUCTION OR GANGRENE: Primary | ICD-10-CM

## 2018-07-31 DIAGNOSIS — K43.2 INCISIONAL HERNIA: ICD-10-CM

## 2018-07-31 RX ORDER — SODIUM CHLORIDE 9 MG/ML
INJECTION, SOLUTION INTRAVENOUS CONTINUOUS
Status: CANCELLED | OUTPATIENT
Start: 2018-07-31

## 2018-08-06 ENCOUNTER — TELEPHONE (OUTPATIENT)
Dept: SURGERY | Facility: CLINIC | Age: 63
End: 2018-08-06

## 2018-08-06 ENCOUNTER — ANESTHESIA EVENT (OUTPATIENT)
Dept: SURGERY | Facility: HOSPITAL | Age: 63
End: 2018-08-06
Payer: COMMERCIAL

## 2018-08-07 ENCOUNTER — ANESTHESIA (OUTPATIENT)
Dept: SURGERY | Facility: HOSPITAL | Age: 63
End: 2018-08-07
Payer: COMMERCIAL

## 2018-08-07 ENCOUNTER — HOSPITAL ENCOUNTER (OUTPATIENT)
Facility: HOSPITAL | Age: 63
Discharge: HOME | End: 2018-08-08
Attending: SURGERY | Admitting: SURGERY
Payer: COMMERCIAL

## 2018-08-07 DIAGNOSIS — K43.2 INCISIONAL HERNIA: ICD-10-CM

## 2018-08-07 PROCEDURE — 63600175 PHARM REV CODE 636 W HCPCS: Performed by: ANESTHESIOLOGY

## 2018-08-07 PROCEDURE — D9220A PRA ANESTHESIA: Mod: CRNA,,, | Performed by: NURSE ANESTHETIST, CERTIFIED REGISTERED

## 2018-08-07 PROCEDURE — 64488 TAP BLOCK BI INJECTION: CPT | Performed by: ANESTHESIOLOGY

## 2018-08-07 PROCEDURE — 71000039 HC RECOVERY, EACH ADD'L HOUR: Performed by: SURGERY

## 2018-08-07 PROCEDURE — 71000033 HC RECOVERY, INTIAL HOUR: Performed by: SURGERY

## 2018-08-07 PROCEDURE — D9220A PRA ANESTHESIA: Mod: ANES,,, | Performed by: ANESTHESIOLOGY

## 2018-08-07 PROCEDURE — 94761 N-INVAS EAR/PLS OXIMETRY MLT: CPT

## 2018-08-07 PROCEDURE — 49560 PR REPAIR INCISIONAL HERNIA,REDUCIBLE: CPT | Mod: ,,, | Performed by: SURGERY

## 2018-08-07 PROCEDURE — C1781 MESH (IMPLANTABLE): HCPCS | Performed by: SURGERY

## 2018-08-07 PROCEDURE — 25000003 PHARM REV CODE 250: Performed by: STUDENT IN AN ORGANIZED HEALTH CARE EDUCATION/TRAINING PROGRAM

## 2018-08-07 PROCEDURE — 25000003 PHARM REV CODE 250: Performed by: PHYSICIAN ASSISTANT

## 2018-08-07 PROCEDURE — 63600175 PHARM REV CODE 636 W HCPCS: Performed by: NURSE ANESTHETIST, CERTIFIED REGISTERED

## 2018-08-07 PROCEDURE — 36000706: Performed by: SURGERY

## 2018-08-07 PROCEDURE — 88302 TISSUE EXAM BY PATHOLOGIST: CPT | Mod: 26,,, | Performed by: PATHOLOGY

## 2018-08-07 PROCEDURE — G0378 HOSPITAL OBSERVATION PER HR: HCPCS

## 2018-08-07 PROCEDURE — 63600175 PHARM REV CODE 636 W HCPCS: Performed by: STUDENT IN AN ORGANIZED HEALTH CARE EDUCATION/TRAINING PROGRAM

## 2018-08-07 PROCEDURE — 63600175 PHARM REV CODE 636 W HCPCS: Performed by: PHYSICIAN ASSISTANT

## 2018-08-07 PROCEDURE — 37000008 HC ANESTHESIA 1ST 15 MINUTES: Performed by: SURGERY

## 2018-08-07 PROCEDURE — 25000003 PHARM REV CODE 250: Performed by: NURSE ANESTHETIST, CERTIFIED REGISTERED

## 2018-08-07 PROCEDURE — 88302 TISSUE EXAM BY PATHOLOGIST: CPT | Performed by: PATHOLOGY

## 2018-08-07 PROCEDURE — 37000009 HC ANESTHESIA EA ADD 15 MINS: Performed by: SURGERY

## 2018-08-07 PROCEDURE — 36000707: Performed by: SURGERY

## 2018-08-07 PROCEDURE — 49568 PR IMPLANT MESH HERNIA REPAIR/DEBRIDEMENT CLOSURE: CPT | Mod: ,,, | Performed by: SURGERY

## 2018-08-07 DEVICE — PATCH HERNIA VENTRIO ST: Type: IMPLANTABLE DEVICE | Site: ABDOMEN | Status: FUNCTIONAL

## 2018-08-07 RX ORDER — SODIUM CHLORIDE 0.9 % (FLUSH) 0.9 %
3 SYRINGE (ML) INJECTION
Status: DISCONTINUED | OUTPATIENT
Start: 2018-08-07 | End: 2018-08-08 | Stop reason: HOSPADM

## 2018-08-07 RX ORDER — OXYCODONE AND ACETAMINOPHEN 5; 325 MG/1; MG/1
1 TABLET ORAL EVERY 4 HOURS PRN
Qty: 42 TABLET | Refills: 0 | Status: SHIPPED | OUTPATIENT
Start: 2018-08-07 | End: 2018-08-07

## 2018-08-07 RX ORDER — OXYCODONE AND ACETAMINOPHEN 5; 325 MG/1; MG/1
1 TABLET ORAL EVERY 4 HOURS PRN
Status: DISCONTINUED | OUTPATIENT
Start: 2018-08-07 | End: 2018-08-08 | Stop reason: HOSPADM

## 2018-08-07 RX ORDER — GLYCOPYRROLATE 0.2 MG/ML
INJECTION INTRAMUSCULAR; INTRAVENOUS
Status: DISCONTINUED | OUTPATIENT
Start: 2018-08-07 | End: 2018-08-07

## 2018-08-07 RX ORDER — HYDROCODONE BITARTRATE AND ACETAMINOPHEN 5; 325 MG/1; MG/1
1 TABLET ORAL EVERY 4 HOURS PRN
Status: DISCONTINUED | OUTPATIENT
Start: 2018-08-07 | End: 2018-08-07

## 2018-08-07 RX ORDER — HYDROCODONE BITARTRATE AND ACETAMINOPHEN 5; 325 MG/1; MG/1
1 TABLET ORAL ONCE
Status: DISCONTINUED | OUTPATIENT
Start: 2018-08-07 | End: 2018-08-08 | Stop reason: HOSPADM

## 2018-08-07 RX ORDER — HYDROMORPHONE HYDROCHLORIDE 1 MG/ML
INJECTION, SOLUTION INTRAMUSCULAR; INTRAVENOUS; SUBCUTANEOUS
Status: DISPENSED
Start: 2018-08-07 | End: 2018-08-07

## 2018-08-07 RX ORDER — OXYCODONE AND ACETAMINOPHEN 10; 325 MG/1; MG/1
TABLET ORAL
Status: DISPENSED
Start: 2018-08-07 | End: 2018-08-07

## 2018-08-07 RX ORDER — PROPOFOL 10 MG/ML
VIAL (ML) INTRAVENOUS
Status: DISCONTINUED | OUTPATIENT
Start: 2018-08-07 | End: 2018-08-07

## 2018-08-07 RX ORDER — SODIUM CHLORIDE 9 MG/ML
INJECTION, SOLUTION INTRAVENOUS CONTINUOUS
Status: DISCONTINUED | OUTPATIENT
Start: 2018-08-07 | End: 2018-08-08

## 2018-08-07 RX ORDER — DEXAMETHASONE SODIUM PHOSPHATE 4 MG/ML
INJECTION, SOLUTION INTRA-ARTICULAR; INTRALESIONAL; INTRAMUSCULAR; INTRAVENOUS; SOFT TISSUE
Status: DISCONTINUED | OUTPATIENT
Start: 2018-08-07 | End: 2018-08-07

## 2018-08-07 RX ORDER — FENTANYL CITRATE 50 UG/ML
25 INJECTION, SOLUTION INTRAMUSCULAR; INTRAVENOUS EVERY 5 MIN PRN
Status: DISCONTINUED | OUTPATIENT
Start: 2018-08-07 | End: 2018-08-07 | Stop reason: HOSPADM

## 2018-08-07 RX ORDER — OXYCODONE AND ACETAMINOPHEN 10; 325 MG/1; MG/1
1 TABLET ORAL EVERY 4 HOURS PRN
Status: DISCONTINUED | OUTPATIENT
Start: 2018-08-07 | End: 2018-08-08 | Stop reason: HOSPADM

## 2018-08-07 RX ORDER — MIDAZOLAM HYDROCHLORIDE 1 MG/ML
INJECTION, SOLUTION INTRAMUSCULAR; INTRAVENOUS
Status: DISCONTINUED | OUTPATIENT
Start: 2018-08-07 | End: 2018-08-07

## 2018-08-07 RX ORDER — ROCURONIUM BROMIDE 10 MG/ML
INJECTION, SOLUTION INTRAVENOUS
Status: DISCONTINUED | OUTPATIENT
Start: 2018-08-07 | End: 2018-08-07

## 2018-08-07 RX ORDER — FAMOTIDINE 20 MG/1
20 TABLET, FILM COATED ORAL 2 TIMES DAILY
Status: DISCONTINUED | OUTPATIENT
Start: 2018-08-07 | End: 2018-08-08 | Stop reason: HOSPADM

## 2018-08-07 RX ORDER — ACETAMINOPHEN 10 MG/ML
INJECTION, SOLUTION INTRAVENOUS
Status: DISCONTINUED | OUTPATIENT
Start: 2018-08-07 | End: 2018-08-07

## 2018-08-07 RX ORDER — OXYCODONE AND ACETAMINOPHEN 5; 325 MG/1; MG/1
1 TABLET ORAL EVERY 4 HOURS PRN
Qty: 42 TABLET | Refills: 0 | Status: SHIPPED | OUTPATIENT
Start: 2018-08-07 | End: 2018-08-08 | Stop reason: HOSPADM

## 2018-08-07 RX ORDER — ONDANSETRON 2 MG/ML
INJECTION INTRAMUSCULAR; INTRAVENOUS
Status: DISCONTINUED | OUTPATIENT
Start: 2018-08-07 | End: 2018-08-07

## 2018-08-07 RX ORDER — SODIUM CHLORIDE 9 MG/ML
INJECTION, SOLUTION INTRAVENOUS CONTINUOUS
Status: DISCONTINUED | OUTPATIENT
Start: 2018-08-07 | End: 2018-08-07

## 2018-08-07 RX ORDER — CEFAZOLIN SODIUM 1 G/3ML
2 INJECTION, POWDER, FOR SOLUTION INTRAMUSCULAR; INTRAVENOUS
Status: COMPLETED | OUTPATIENT
Start: 2018-08-07 | End: 2018-08-07

## 2018-08-07 RX ORDER — NEOSTIGMINE METHYLSULFATE 1 MG/ML
INJECTION, SOLUTION INTRAVENOUS
Status: DISCONTINUED | OUTPATIENT
Start: 2018-08-07 | End: 2018-08-07

## 2018-08-07 RX ORDER — LIDOCAINE HCL/PF 100 MG/5ML
SYRINGE (ML) INTRAVENOUS
Status: DISCONTINUED | OUTPATIENT
Start: 2018-08-07 | End: 2018-08-07

## 2018-08-07 RX ORDER — HYDROMORPHONE HYDROCHLORIDE 1 MG/ML
0.2 INJECTION, SOLUTION INTRAMUSCULAR; INTRAVENOUS; SUBCUTANEOUS EVERY 5 MIN PRN
Status: DISCONTINUED | OUTPATIENT
Start: 2018-08-07 | End: 2018-08-07 | Stop reason: HOSPADM

## 2018-08-07 RX ORDER — MIDAZOLAM HYDROCHLORIDE 1 MG/ML
0.5 INJECTION INTRAMUSCULAR; INTRAVENOUS
Status: DISCONTINUED | OUTPATIENT
Start: 2018-08-07 | End: 2018-08-07 | Stop reason: HOSPADM

## 2018-08-07 RX ORDER — ONDANSETRON 8 MG/1
8 TABLET, ORALLY DISINTEGRATING ORAL EVERY 6 HOURS PRN
Status: DISCONTINUED | OUTPATIENT
Start: 2018-08-07 | End: 2018-08-08 | Stop reason: HOSPADM

## 2018-08-07 RX ADMIN — OXYCODONE HYDROCHLORIDE AND ACETAMINOPHEN 1 TABLET: 10; 325 TABLET ORAL at 10:08

## 2018-08-07 RX ADMIN — LIDOCAINE HYDROCHLORIDE 100 MG: 20 INJECTION, SOLUTION INTRAVENOUS at 09:08

## 2018-08-07 RX ADMIN — ONDANSETRON 4 MG: 2 INJECTION INTRAMUSCULAR; INTRAVENOUS at 10:08

## 2018-08-07 RX ADMIN — HYDROMORPHONE HYDROCHLORIDE 0.2 MG: 1 INJECTION, SOLUTION INTRAMUSCULAR; INTRAVENOUS; SUBCUTANEOUS at 11:08

## 2018-08-07 RX ADMIN — HYDROMORPHONE HYDROCHLORIDE 0.2 MG: 1 INJECTION, SOLUTION INTRAMUSCULAR; INTRAVENOUS; SUBCUTANEOUS at 10:08

## 2018-08-07 RX ADMIN — SODIUM CHLORIDE: 0.9 INJECTION, SOLUTION INTRAVENOUS at 06:08

## 2018-08-07 RX ADMIN — GLYCOPYRROLATE 0.6 MG: 0.2 INJECTION, SOLUTION INTRAMUSCULAR; INTRAVENOUS at 10:08

## 2018-08-07 RX ADMIN — SODIUM CHLORIDE: 0.9 INJECTION, SOLUTION INTRAVENOUS at 10:08

## 2018-08-07 RX ADMIN — CEFAZOLIN 2 G: 330 INJECTION, POWDER, FOR SOLUTION INTRAMUSCULAR; INTRAVENOUS at 09:08

## 2018-08-07 RX ADMIN — MIDAZOLAM HYDROCHLORIDE 1 MG: 1 INJECTION, SOLUTION INTRAMUSCULAR; INTRAVENOUS at 08:08

## 2018-08-07 RX ADMIN — NEOSTIGMINE METHYLSULFATE 5 MG: 1 INJECTION INTRAVENOUS at 10:08

## 2018-08-07 RX ADMIN — FENTANYL CITRATE 50 MCG: 50 INJECTION INTRAMUSCULAR; INTRAVENOUS at 08:08

## 2018-08-07 RX ADMIN — PROMETHAZINE HYDROCHLORIDE 12.5 MG: 25 INJECTION INTRAMUSCULAR; INTRAVENOUS at 01:08

## 2018-08-07 RX ADMIN — ROCURONIUM BROMIDE 45 MG: 10 INJECTION, SOLUTION INTRAVENOUS at 09:08

## 2018-08-07 RX ADMIN — SUGAMMADEX 200 MG: 100 INJECTION, SOLUTION INTRAVENOUS at 10:08

## 2018-08-07 RX ADMIN — SODIUM CHLORIDE: 0.9 INJECTION, SOLUTION INTRAVENOUS at 08:08

## 2018-08-07 RX ADMIN — DEXAMETHASONE SODIUM PHOSPHATE 8 MG: 4 INJECTION, SOLUTION INTRAMUSCULAR; INTRAVENOUS at 09:08

## 2018-08-07 RX ADMIN — ACETAMINOPHEN 1000 MG: 10 INJECTION, SOLUTION INTRAVENOUS at 09:08

## 2018-08-07 RX ADMIN — PROPOFOL 200 MG: 10 INJECTION, EMULSION INTRAVENOUS at 09:08

## 2018-08-07 RX ADMIN — MIDAZOLAM HYDROCHLORIDE 1 MG: 1 INJECTION, SOLUTION INTRAMUSCULAR; INTRAVENOUS at 09:08

## 2018-08-07 NOTE — ANESTHESIA POSTPROCEDURE EVALUATION
Anesthesia Post Evaluation    Patient: Apoorva Fernandez    Procedure(s) Performed: Procedure(s) (LRB):  REPAIR,HERNIA,INCISIONAL OR VENTRAL,WITHOUT HISTORY OF PRIOR REPAIR Open with Mesh (N/A)    Final Anesthesia Type: general  Patient location during evaluation: PACU  Patient participation: Yes- Able to Participate  Level of consciousness: awake and alert  Post-procedure vital signs: reviewed and stable  Pain management: adequate  Airway patency: patent  PONV status at discharge: No PONV  Anesthetic complications: no      Cardiovascular status: blood pressure returned to baseline  Respiratory status: unassisted  Hydration status: euvolemic  Follow-up not needed.        Visit Vitals  /67   Pulse (!) 59   Temp 35.7 °C (96.2 °F)   Resp 16   Ht 5' (1.524 m)   Wt 96.2 kg (212 lb 1.3 oz)   LMP 08/22/2015 (Exact Date)   SpO2 98%   Breastfeeding? No   BMI 41.42 kg/m²       Pain/Thomas Score: Pain Assessment Performed: Yes (8/7/2018 11:45 AM)  Presence of Pain: non-verbal indicators absent (sleeping, NAD noted) (8/7/2018 11:45 AM)  Pain Rating Prior to Med Admin: 5 (8/7/2018 11:05 AM)  Pain Rating Post Med Admin: 5 (8/7/2018 11:05 AM)  Thomas Score: 10 (8/7/2018 11:45 AM)

## 2018-08-07 NOTE — ANESTHESIA PROCEDURE NOTES
Rectus Sheath Single Injection Blocks, bilateral     Patient location during procedure: pre-op   Block not for primary anesthetic.  Reason for block: at surgeon's request and post-op pain management   Post-op Pain Location: abdominal pain  Start time: 8/7/2018 8:45 AM  Timeout: 8/7/2018 8:43 AM   End time: 8/7/2018 8:55 AM  Surgery related to: ventral hernia  Staffing  Anesthesiologist: AVINASH PALACIOS  Resident/CRNA: ROCIO CURTIS  Performed: resident/CRNA   Preanesthetic Checklist  Completed: patient identified, site marked, surgical consent, pre-op evaluation, timeout performed, IV checked, risks and benefits discussed and monitors and equipment checked  Peripheral Block  Patient position: supine  Prep: ChloraPrep  Patient monitoring: heart rate, cardiac monitor, continuous pulse ox, continuous capnometry and frequent blood pressure checks  Block type: rectus sheath  Laterality: bilateral  Injection technique: single shot  Needle  Needle type: Stimuplex   Needle gauge: 22 G  Needle length: 2 in  Needle localization: anatomical landmarks and ultrasound guidance   -ultrasound image captured on disc.  Assessment  Injection assessment: negative aspiration, negative parasthesia and local visualized surrounding nerve  Paresthesia pain: none  Heart rate change: no  Slow fractionated injection: yes  Medications:  Bolus administered: 40 mL of 0.25 ropivacaine  Epinephrine added: 3.75 mcg/mL (1/300,000)  Additional Notes  VSS.  Vitals monitored throughout procedure - see record.  Patient tolerated procedure well.

## 2018-08-07 NOTE — ANESTHESIA PREPROCEDURE EVALUATION
08/07/2018  Apoorva Fernandez is a 63 y.o., female with ventral hernia here for surgical repair.    Pre-operative evaluation for Procedure(s) (LRB):  REPAIR,HERNIA,INCISIONAL OR VENTRAL,WITHOUT HISTORY OF PRIOR REPAIR Open with Mesh (N/A)        Patient Active Problem List   Diagnosis    Hyperlipidemia    Personal history of peptic ulcer disease    Morbid obesity with BMI of 45.0-49.9, adult    Allergic rhinitis    HTN (hypertension)    Postmenopausal bleeding    Primary osteoarthritis of both knees    BELLA (obstructive sleep apnea)    Seronegative rheumatoid arthritis    Primary osteoarthritis involving multiple joints    Incisional hernia       Review of patient's allergies indicates:   Allergen Reactions    Antihistamines - alkylamine Shortness Of Breath    Lipitor  [atorvastatin]      Other reaction(s): myalgia    Zetia  [ezetimibe]      Other reaction(s): muscle cramps       No current facility-administered medications on file prior to encounter.      Current Outpatient Prescriptions on File Prior to Encounter   Medication Sig Dispense Refill    aspirin (ECOTRIN) 81 MG EC tablet Take 81 mg by mouth once daily.      famotidine (PEPCID) 20 MG tablet Take 20 mg by mouth 2 (two) times daily.      fluticasone (FLONASE) 50 mcg/actuation nasal spray SPRAY 1 SPRAY INTO EACH NOSTRIL ONCE EVERY DAY 16 g 5    ibuprofen (ADVIL,MOTRIN) 200 MG tablet Take 800 mg by mouth every 6 (six) hours as needed for Pain.       MULTIVITS-MIN/FA/CA CARB/VIT K (ONE-A-DAY WOMEN'S 50+ ORAL) Take 1 tablet by mouth once daily.      omega 3-dha-epa-fish oil 1,000 (120-180) mg Cap Take 1 capsule by mouth once daily.      lidocaine (LIDODERM) 5 % Place 1 patch onto the skin every 24 hours. Remove & Discard patch within 12 hours or as directed by MD         Past Surgical History:   Procedure Laterality Date     Abdominal/umbilical hernia repair      removed small part of small bowel; mesh placed     SECTION, LOW TRANSVERSE      X2    CHOLECYSTECTOMY      Open laparoscopy for a ruptured ulcer         Social History     Social History    Marital status:      Spouse name: N/A    Number of children: 2    Years of education: N/A     Occupational History          Social History Main Topics    Smoking status: Never Smoker    Smokeless tobacco: Never Used    Alcohol use 1.2 - 1.8 oz/week     2 - 3 Glasses of wine per week      Comment: Socially on the weekends    Drug use: No    Sexual activity: Yes     Partners: Male     Other Topics Concern    Not on file     Social History Narrative    No narrative on file         CBC: No results for input(s): WBC, RBC, HGB, HCT, PLT, MCV, MCH, MCHC in the last 72 hours.    CMP: No results for input(s): NA, K, CL, CO2, BUN, CREATININE, GLU, MG, PHOS, CALCIUM, ALBUMIN, PROT, ALKPHOS, ALT, AST, BILITOT in the last 72 hours.    INR  No results for input(s): PT, INR, PROTIME, APTT in the last 72 hours.            2D Echo:  No results found for this or any previous visit.      Anesthesia Evaluation    I have reviewed the Patient Summary Reports.    I have reviewed the Nursing Notes.   I have reviewed the Medications.     Review of Systems  Anesthesia Hx:  No problems with previous Anesthesia    Hematology/Oncology:  Hematology Normal   Oncology Normal     EENT/Dental:EENT/Dental Normal   Cardiovascular:   Hypertension    Pulmonary:  Pulmonary Normal    Renal/:  Renal/ Normal     Hepatic/GI:  Hepatic/GI Normal    Musculoskeletal:   Arthritis     Neurological:  Neurology Normal    Endocrine:  Endocrine Normal    Dermatological:  Skin Normal    Psych:  Psychiatric Normal           Physical Exam  General:  Well nourished, Morbid Obesity    Airway/Jaw/Neck:  Airway Findings: Mouth Opening: Normal Tongue: Normal  General Airway Assessment: Adult   Mallampati: II  Jaw/Neck Findings:  Neck ROM: Normal ROM     Eyes/Ears/Nose:  Eyes/Ears/Nose Findings:    Dental:  Dental Findings: In tact   Chest/Lungs:  Chest/Lungs Findings: Clear to auscultation, Normal Respiratory Rate     Heart/Vascular:  Heart Findings: Rate: Normal  Rhythm: Regular Rhythm  Sounds: Normal  Heart Murmur  Vascular Findings:        Mental Status:  Mental Status Findings:  Cooperative, Alert and Oriented         Anesthesia Plan  Type of Anesthesia, risks & benefits discussed:  Anesthesia Type:  general  Patient's Preference: General  Intra-op Monitoring Plan: standard ASA monitors  Intra-op Monitoring Plan Comments:   Post Op Pain Control Plan: peripheral nerve block and multimodal analgesia  Post Op Pain Control Plan Comments:   Induction:   IV  Beta Blocker:         Informed Consent: Patient understands risks and agrees with Anesthesia plan.  Questions answered. Anesthesia consent signed with patient.  ASA Score: 3     Day of Surgery Review of History & Physical:    H&P update referred to the surgeon.     Anesthesia Plan Notes: Discussed plan for general endotracheal anesthesia, pt understands and agrees with plan        Ready For Surgery From Anesthesia Perspective.

## 2018-08-07 NOTE — TRANSFER OF CARE
Anesthesia Transfer of Care Note    Patient: Apoorva Fernandez    Procedure(s) Performed: Procedure(s) (LRB):  REPAIR,HERNIA,INCISIONAL OR VENTRAL,WITHOUT HISTORY OF PRIOR REPAIR Open with Mesh (N/A)    Patient location: PACU    Anesthesia Type: general    Transport from OR: Transported from OR on 6-10 L/min O2 by face mask with adequate spontaneous ventilation    Post pain: adequate analgesia    Post assessment: no apparent anesthetic complications    Post vital signs: stable    Level of consciousness: alert and awake    Nausea/Vomiting: no nausea/vomiting    Complications: none    Transfer of care protocol was followed      Last vitals:   Visit Vitals  /62   Pulse (!) 59   Temp 36.3 °C (97.3 °F) (Temporal)   Resp 16   Ht 5' (1.524 m)   Wt 96.2 kg (212 lb 1.3 oz)   LMP 08/22/2015 (Exact Date)   SpO2 100%   Breastfeeding? No   BMI 41.42 kg/m²

## 2018-08-07 NOTE — OP NOTE
DATE OF PROCEDURE:  08/07/2018    PREOPERATIVE DIAGNOSIS:  Incisional hernia.    POSTOPERATIVE DIAGNOSIS:  Incisional hernia.    PROCEDURES PERFORMED:  1.  Repair of incisional hernia.  2.  Implantation of prosthetic mesh.    SURGEON:  Pancho Montes M.D.    ASSISTANT:  Umu Fernández M.D (RES)    ANESTHESIA:  General.    CLINICAL NOTE:  The patient is a 63-year-old female with a recurrent incisional   hernia.    PROCEDURE NOTE IN DETAIL:  Following induction of adequate general anesthesia,   the patient's abdomen was prepped and draped with ChloraPrep.  I made a midline   incision from the infraumbilical area to approximately midway up the   epigastrium, dissected down and there were some significant scar tissue in the   subcutaneous tissue and ____ fascia from the previous mesh repair.  I was able   to gradually enter the peritoneal cavity and open the fascia.  I took down   adhesions of small bowel and omentum to the undersurface of the abdominal wall   and into one of two hernia sacs just to the right of the midline.  When I   reduced all contents and cleared the adhesions, we raised flaps about the defect   circumferentially and then placed an underlay of prosthetic mesh, which closed   the defect and encompassed both hernia orifices and we secured the mesh to the   abdominal wall with interrupted U stitches of #1 PDS.  This allowed closure of   the defect and also allowed primary closure of the fascial edges on top of the   mesh with an additional layer of #1 PDS.  We then placed a closed suction drain   and obtained hemostasis under the skin flaps and then we closed the subcutaneous   tissue with Vicryl and the skin with staples.  Sterile dressings were applied   and the procedure was terminated with the patient tolerating it well.    ESTIMATED BLOOD LOSS:  50 mL.      MCT/IN  dd: 08/07/2018 10:43:26 (CDT)  td: 08/07/2018 11:05:24 (CDT)  Doc ID   #[id number]  Job ID #[id number]    CC:

## 2018-08-07 NOTE — NURSING TRANSFER
Nursing Transfer Note      8/7/2018     Transfer To: [address]    Transfer via stretcher    Transfer with IV pole    Transported by PCT    Medicines sent: none    Chart send with patient: Yes    Notified: nurse liaison to notify spouse     Patient reassessed at: [id number]

## 2018-08-07 NOTE — H&P
History & Physical     SUBJECTIVE:      History of Present Illness:  Patient is a 63 y.o. female presents with symptomatic incisional hernia x 2. Onset of symptoms was gradual starting several months ago with gradually worsening course since that time. Patient denies obstructive symptoms. Symptoms are aggravated by activity. Symptoms improve with rest.           Chief Complaint   Patient presents with    Follow-up               Review of patient's allergies indicates:   Allergen Reactions    Antihistamines - alkylamine Shortness Of Breath    Lipitor  [atorvastatin]         Other reaction(s): myalgia    Zetia  [ezetimibe]         Other reaction(s): muscle cramps         Current Medications          Current Outpatient Prescriptions   Medication Sig Dispense Refill    aspirin (ECOTRIN) 81 MG EC tablet Take 81 mg by mouth once daily.        famotidine (PEPCID) 20 MG tablet Take 20 mg by mouth 2 (two) times daily.        fluticasone (FLONASE) 50 mcg/actuation nasal spray SPRAY 1 SPRAY INTO EACH NOSTRIL ONCE EVERY DAY 16 g 5    ibuprofen (ADVIL,MOTRIN) 200 MG tablet Take 800 mg by mouth every 6 (six) hours as needed for Pain.         lidocaine (LIDODERM) 5 % Place 1 patch onto the skin every 24 hours. Remove & Discard patch within 12 hours or as directed by MD        MULTIVITS-MIN/FA/CA CARB/VIT K (ONE-A-DAY WOMEN'S 50+ ORAL) Take 1 tablet by mouth once daily.        omega 3-dha-epa-fish oil 1,000 (120-180) mg Cap Take 1 capsule by mouth once daily.          No current facility-administered medications for this visit.                  Past Medical History:   Diagnosis Date    Allergic rhinitis      Allergy      Arthritis      Hyperlipidemia       Diet-controlled    Hypertension      Joint pain      Keloid cicatrix      Obesity      Osteoarthritis      Personal history of colonic polyps August 2010     Adenomatous    Personal history of peptic ulcer disease      Umbilical hernia              Past  Surgical History:   Procedure Laterality Date    Abdominal/umbilical hernia repair         removed small part of small bowel; mesh placed     SECTION, LOW TRANSVERSE         X2    CHOLECYSTECTOMY        Open laparoscopy for a ruptured ulcer                Family History   Problem Relation Age of Onset    Breast cancer Mother            at age 65    Cancer Father           Bladder    Glaucoma Father      Hypertension Father      Heart disease Father      Obesity Sister      Heart disease Brother      Sleep apnea Brother      Diabetes Neg Hx      Melanoma Neg Hx                Social History    Substance Use Topics     Smoking status: Never Smoker     Smokeless tobacco: Never Used     Alcohol use 1.2 - 1.8 oz/week       2 - 3 Glasses of wine per week         Comment: Socially on the weekends          Review of Systems:           Review of Systems  Anesthesia Hx:  No problems with previous Anesthesia   Social:  Non-Smoker    Hematology/Oncology:  Hematology Normal   Oncology Normal   Cardiovascular:   Hypertension    Renal/:  Renal/ Normal     Hepatic/GI:  Hepatic/GI Normal    Musculoskeletal:   Arthritis     Endocrine:  Endocrine Normal    Dermatological:  Skin Normal    Psych:  Psychiatric Normal           OBJECTIVE:      Vital Signs (Most Recent)  Temp: 97.7 °F (36.5 °C) (18 1435)  Pulse: (!) 56 (18 1435)  BP: (!) 149/77 (18 1435)  5' (1.524 m)  96.2 kg (212 lb)      Physical Exam:     Physical Exam  General:  Obesity    Airway/Jaw/Neck:  AIRWAY FINDINGS: Normal      Eyes/Ears/Nose:  EYES/EARS/NOSE FINDINGS: Normal   Dental:  DENTAL FINDINGS: Normal   Chest/Lungs:  Chest/Lungs Clear    Heart/Vascular:  Heart Findings: Normal Heart murmur: negative Vascular Findings:     Abdomen:  Abdomen Findings:  Tenderness  At least two areas of reducible incisional hernia     Musculoskeletal:  Musculoskeletal Findings: Normal   Skin:  Skin Findings: Normal    Mental  Status:  Mental Status Findings: Normal          Laboratory  none     Diagnostic Results:  CT: Reviewed  2 areas of incisional hernia with bowel      ASSESSMENT/PLAN:      Incisional hernia repair w/wo mesh, open

## 2018-08-07 NOTE — BRIEF OP NOTE
[facility]  Brief Operative Note    SUMMARY     Surgery Date: 8/7/2018     Surgeon(s) and Role:     * Pancho Montes MD - Primary     * Umu Fernández MD - Resident - Assisting        Pre-op Diagnosis:  Incisional hernia, without obstruction or gangrene [K43.2]    Post-op Diagnosis:  Post-Op Diagnosis Codes:     * Incisional hernia, without obstruction or gangrene [K43.2]    Procedure(s) (LRB):  REPAIR,HERNIA,INCISIONAL OR VENTRAL,WITHOUT HISTORY OF PRIOR REPAIR Open with Mesh (N/A)    Anesthesia: General    Description of Procedure: incisional hernia repair with mesh    Description of the findings of the procedure: Incisional hernia repair with mesh    Estimated Blood Loss: * No values recorded between 8/7/2018  9:32 AM and 8/7/2018 10:34 AM *         Specimens:   Specimen (12h ago through future)    Start     Ordered    08/07/18 1022  Specimen to Pathology - Surgery  Once     Comments:  1. Hernia sac - Permanent      08/07/18 1022

## 2018-08-08 VITALS
BODY MASS INDEX: 41.63 KG/M2 | HEIGHT: 60 IN | TEMPERATURE: 97 F | SYSTOLIC BLOOD PRESSURE: 127 MMHG | DIASTOLIC BLOOD PRESSURE: 58 MMHG | OXYGEN SATURATION: 95 % | HEART RATE: 58 BPM | RESPIRATION RATE: 18 BRPM | WEIGHT: 212.06 LBS

## 2018-08-08 PROCEDURE — 25000003 PHARM REV CODE 250: Performed by: STUDENT IN AN ORGANIZED HEALTH CARE EDUCATION/TRAINING PROGRAM

## 2018-08-08 RX ORDER — IBUPROFEN 800 MG/1
800 TABLET ORAL 3 TIMES DAILY PRN
Qty: 21 TABLET | Refills: 0 | Status: SHIPPED | OUTPATIENT
Start: 2018-08-08 | End: 2018-12-28

## 2018-08-08 RX ORDER — OXYCODONE AND ACETAMINOPHEN 5; 325 MG/1; MG/1
1 TABLET ORAL EVERY 4 HOURS PRN
Qty: 42 TABLET | Refills: 0 | Status: SHIPPED | OUTPATIENT
Start: 2018-08-08 | End: 2018-08-08

## 2018-08-08 RX ORDER — ONDANSETRON 4 MG/1
4 TABLET, ORALLY DISINTEGRATING ORAL EVERY 6 HOURS PRN
Qty: 10 TABLET | Refills: 2 | Status: SHIPPED | OUTPATIENT
Start: 2018-08-08 | End: 2018-12-28

## 2018-08-08 RX ORDER — SULFAMETHOXAZOLE AND TRIMETHOPRIM 800; 160 MG/1; MG/1
1 TABLET ORAL 2 TIMES DAILY
Qty: 20 TABLET | Refills: 0 | Status: SHIPPED | OUTPATIENT
Start: 2018-08-08 | End: 2018-08-18

## 2018-08-08 RX ORDER — OXYCODONE AND ACETAMINOPHEN 5; 325 MG/1; MG/1
1 TABLET ORAL EVERY 4 HOURS PRN
Qty: 42 TABLET | Refills: 0 | Status: SHIPPED | OUTPATIENT
Start: 2018-08-08 | End: 2018-12-28

## 2018-08-08 RX ADMIN — OXYCODONE HYDROCHLORIDE AND ACETAMINOPHEN 1 TABLET: 10; 325 TABLET ORAL at 05:08

## 2018-08-08 RX ADMIN — OXYCODONE HYDROCHLORIDE AND ACETAMINOPHEN 1 TABLET: 10; 325 TABLET ORAL at 02:08

## 2018-08-08 RX ADMIN — FAMOTIDINE 20 MG: 20 TABLET ORAL at 09:08

## 2018-08-08 NOTE — SUBJECTIVE & OBJECTIVE
Interval History:   Patient seen and examined, no acute events overnight  Tolerating clears with no nausea or vomiting  Pain well controlled  ZEESHAN drain put out 65mL overnight  Afebrile/VSS    Medications:  Continuous Infusions:   sodium chloride 0.9%      sodium chloride 0.9% 125 mL/hr at 08/07/18 1841     Scheduled Meds:   famotidine  20 mg Oral BID    HYDROcodone-acetaminophen  1 tablet Oral Once     PRN Meds:ondansetron, oxyCODONE-acetaminophen, oxyCODONE-acetaminophen, promethazine (PHENERGAN) IVPB, sodium chloride 0.9%     Review of patient's allergies indicates:   Allergen Reactions    Antihistamines - alkylamine Shortness Of Breath    Lipitor  [atorvastatin]      Other reaction(s): myalgia    Zetia  [ezetimibe]      Other reaction(s): muscle cramps     Objective:     Vital Signs (Most Recent):  Temp: 96.4 °F (35.8 °C) (08/08/18 0400)  Pulse: (!) 53 (08/08/18 0400)  Resp: 18 (08/08/18 0400)  BP: (!) 101/57 (08/08/18 0400)  SpO2: 97 % (08/08/18 0400) Vital Signs (24h Range):  Temp:  [96 °F (35.6 °C)-99 °F (37.2 °C)] 96.4 °F (35.8 °C)  Pulse:  [49-67] 53  Resp:  [11-20] 18  SpO2:  [94 %-100 %] 97 %  BP: ()/(50-71) 101/57     Weight: 96.2 kg (212 lb 1.3 oz)  Body mass index is 41.42 kg/m².    Intake/Output - Last 3 Shifts       08/06 0700 - 08/07 0659 08/07 0700 - 08/08 0659    P.O.  540    I.V. (mL/kg)  1034 (10.7)    Total Intake(mL/kg)  1574 (16.4)    Urine (mL/kg/hr)  600 (0.3)    Drains  65 (0)    Total Output   665    Net   +909          Urine Occurrence  5 x          Physical Exam   Constitutional: She appears well-developed and well-nourished. No distress.   HENT:   Head: Normocephalic and atraumatic.   Cardiovascular: Normal rate and regular rhythm.    Pulmonary/Chest: Effort normal. No respiratory distress.   Abdominal:   Soft, appropriate TTP, non-distended  Binder in place  Incision with surgical dressing - clean, dry and intact  ZEESHAN drain with serosang drainage

## 2018-08-08 NOTE — ASSESSMENT & PLAN NOTE
64 yo female s/p incisional hernia repair    -regular diet  -PO pain/nausea meds PRN  -DVT prophylaxis  -d/c IVF  -plan for dc today pending toleration of diet

## 2018-08-08 NOTE — PLAN OF CARE
Problem: Pain, Acute (Adult)  Intervention: Monitor/Manage Analgesia  Pt with mininimal pain, ambulating to bathroom without difficulty.

## 2018-08-08 NOTE — PROGRESS NOTES
[facility]  General Surgery  Progress Note    Subjective:     Post-Op Info:  Procedure(s) (LRB):  REPAIR,HERNIA,INCISIONAL OR VENTRAL,WITHOUT HISTORY OF PRIOR REPAIR Open with Mesh (N/A)   1 Day Post-Op     Interval History:   Patient seen and examined, no acute events overnight  Tolerating clears with no nausea or vomiting  Pain well controlled  ZEESHAN drain put out 65mL overnight  Afebrile/VSS    Medications:  Continuous Infusions:   sodium chloride 0.9%      sodium chloride 0.9% 125 mL/hr at 08/07/18 1841     Scheduled Meds:   famotidine  20 mg Oral BID    HYDROcodone-acetaminophen  1 tablet Oral Once     PRN Meds:ondansetron, oxyCODONE-acetaminophen, oxyCODONE-acetaminophen, promethazine (PHENERGAN) IVPB, sodium chloride 0.9%     Review of patient's allergies indicates:   Allergen Reactions    Antihistamines - alkylamine Shortness Of Breath    Lipitor  [atorvastatin]      Other reaction(s): myalgia    Zetia  [ezetimibe]      Other reaction(s): muscle cramps     Objective:     Vital Signs (Most Recent):  Temp: 96.4 °F (35.8 °C) (08/08/18 0400)  Pulse: (!) 53 (08/08/18 0400)  Resp: 18 (08/08/18 0400)  BP: (!) 101/57 (08/08/18 0400)  SpO2: 97 % (08/08/18 0400) Vital Signs (24h Range):  Temp:  [96 °F (35.6 °C)-99 °F (37.2 °C)] 96.4 °F (35.8 °C)  Pulse:  [49-67] 53  Resp:  [11-20] 18  SpO2:  [94 %-100 %] 97 %  BP: ()/(50-71) 101/57     Weight: 96.2 kg (212 lb 1.3 oz)  Body mass index is 41.42 kg/m².    Intake/Output - Last 3 Shifts       08/06 0700 - 08/07 0659 08/07 0700 - 08/08 0659    P.O.  540    I.V. (mL/kg)  1034 (10.7)    Total Intake(mL/kg)  1574 (16.4)    Urine (mL/kg/hr)  600 (0.3)    Drains  65 (0)    Total Output   665    Net   +909          Urine Occurrence  5 x          Physical Exam   Constitutional: She appears well-developed and well-nourished. No distress.   HENT:   Head: Normocephalic and atraumatic.   Cardiovascular: Normal rate and regular rhythm.    Pulmonary/Chest:  Effort normal. No respiratory distress.   Abdominal:   Soft, appropriate TTP, non-distended  Binder in place  Incision with surgical dressing - clean, dry and intact  ZEESHAN drain with serosang drainage       Assessment/Plan:     * Incisional hernia    64 yo female s/p incisional hernia repair    -regular diet  -PO pain/nausea meds PRN  -DVT prophylaxis  -d/c IVF  -plan for dc today pending toleration of diet            Nica Wise PA-C   [id number]  General Surgery  [facility]

## 2018-08-08 NOTE — DISCHARGE SUMMARY
[facility]  General Surgery  Discharge Summary      Patient Name: Apoorva Fernandez  MRN: [id number]  Admission Date: 8/7/2018  Hospital Length of Stay: 0 days  Discharge Date and Time:  08/08/2018 7:04 AM  Attending Physician: Pancho Montes MD   Discharging Provider: Nica Wise PA-C  Primary Care Provider: Najma Rangel MD    HPI:   History of Present Illness:  Patient is a 63 y.o. female presents with symptomatic incisional hernia x 2. Onset of symptoms was gradual starting several months ago with gradually worsening course since that time. Patient denies obstructive symptoms. Symptoms are aggravated by activity. Symptoms improve with rest.      Procedure(s) (LRB):  REPAIR,HERNIA,INCISIONAL OR VENTRAL,WITHOUT HISTORY OF PRIOR REPAIR Open with Mesh (N/A)      Indwelling Lines/Drains at time of discharge:   Lines/Drains/Airways     Drain                 Closed/Suction Drain 08/07/18 1015 Abdomen Bulb 19 Fr. less than 1 day          Epidural Line                 Perineural Analgesia/Anesthesia Assessment (using dermatomes) Epidural 08/07/18 0845 less than 1 day              Hospital Course:   Patient presented to clinic with incisional hernia. She underwent: Procedure(s) (LRB):  REPAIR,HERNIA,INCISIONAL OR VENTRAL,WITHOUT HISTORY OF PRIOR REPAIR Open with Mesh (N/A). She tolerated the procedure well and was transferred to the floor after recovery from anesthesia. Please see the operative note for further procedure details. Vitals remained stable, and she was afebrile all throughout the post operative period. Labs were reviewed and electrolytes were replaced appropriately. Physical exam was appropriate for post operative state.  Diet was advanced, and She was able to tolerate a regular diet prior to discharge. She was ambulating without difficulty and had normal bowel function prior to discharge. Patient was deemed suitable for discharge on 1 Day Post-Op. She was discharged home with  medications and instructions as below. She voiced understanding of the instructions prior to discharge.     For more thorough information, please refer to the hospital record and operative report.    Consults:     Significant Diagnostic Studies:  All labs within the past 24 hours have been reviewed    Pending Diagnostic Studies:     None        Final Active Diagnoses:    Diagnosis Date Noted POA    PRINCIPAL PROBLEM:  Incisional hernia [K43.2] 08/07/2018 Yes      Problems Resolved During this Admission:    Diagnosis Date Noted Date Resolved POA      Patient Active Problem List   Diagnosis    Hyperlipidemia    Personal history of peptic ulcer disease    Morbid obesity with BMI of 45.0-49.9, adult    Allergic rhinitis    HTN (hypertension)    Postmenopausal bleeding    Primary osteoarthritis of both knees    BELLA (obstructive sleep apnea)    Seronegative rheumatoid arthritis    Primary osteoarthritis involving multiple joints    Incisional hernia     Discharged Condition: good    Disposition: Home or Self Care    Follow Up:  Follow-up Information     Pancho Montes MD In 1 week.    Specialty:  General Surgery  Contact information:  [address]  [facility] [id number]  [phone]                 Patient Instructions:     Diet Adult Regular     Notify your health care provider if you experience any of the following:  persistent nausea and vomiting or diarrhea     Notify your health care provider if you experience any of the following:  severe uncontrolled pain     Notify your health care provider if you experience any of the following:  redness, tenderness, or signs of infection (pain, swelling, redness, odor or green/yellow discharge around incision site)     Notify your health care provider if you experience any of the following:  worsening rash     Remove dressing in 48 hours     Lifting restrictions   Order Comments: No lifting more than 10 lbs for 6 weeks.     Lifting restrictions   Order  Comments: No heavy lifting, nothing heavier than 10lbs     Notify your health care provider if you experience any of the following:  difficulty breathing or increased cough     Notify your health care provider if you experience any of the following:  redness, tenderness, or signs of infection (pain, swelling, redness, odor or green/yellow discharge around incision site)     Notify your health care provider if you experience any of the following:  severe uncontrolled pain     Notify your health care provider if you experience any of the following:  temperature >100.4     Notify your health care provider if you experience any of the following:  persistent nausea and vomiting or diarrhea     Change dressing (specify)   Order Comments: Staples do not need to be covered. No bathing, swimming or soaking in a tub. Showering is okay.  ZEESHAN drain can be covered with gauze and tape as needed. Change dressing PRN.  Record ZEESHAN drain output daily and bring record with you to clinic.     Activity as tolerated     Shower on day dressing removed (No bath)       Medications:  Reconciled Home Medications:      Medication List      START taking these medications    docusate sodium 50 MG capsule  Commonly known as:  COLACE  Take 1 capsule (50 mg total) by mouth 2 (two) times daily.     oxyCODONE-acetaminophen 5-325 mg per tablet  Commonly known as:  PERCOCET  Take 1 tablet by mouth every 4 (four) hours as needed.     sulfamethoxazole-trimethoprim 800-160mg 800-160 mg Tab  Commonly known as:  BACTRIM DS  Take 1 tablet by mouth 2 (two) times daily. for 10 days        CHANGE how you take these medications    ibuprofen 800 MG tablet  Commonly known as:  ADVIL,MOTRIN  Take 1 tablet (800 mg total) by mouth 3 (three) times daily as needed for Pain.  What changed:  · medication strength  · when to take this        CONTINUE taking these medications    aspirin 81 MG EC tablet  Commonly known as:  ECOTRIN  Take 81 mg by mouth once daily.      famotidine 20 MG tablet  Commonly known as:  PEPCID  Take 20 mg by mouth 2 (two) times daily.     fluticasone 50 mcg/actuation nasal spray  Commonly known as:  FLONASE  SPRAY 1 SPRAY INTO EACH NOSTRIL ONCE EVERY DAY     lidocaine 5 %  Commonly known as:  LIDODERM  Place 1 patch onto the skin every 24 hours. Remove & Discard patch within 12 hours or as directed by MD     omega 3-dha-epa-fish oil 1,000 mg (120 mg-180 mg) Cap  Take 1 capsule by mouth once daily.     ONE-A-DAY WOMEN'S 50+ ORAL  Take 1 tablet by mouth once daily.          Time spent on the discharge of patient: 2 minutes    Nica Wise PA-C  General Surgery  [facility]

## 2018-08-08 NOTE — PROGRESS NOTES
Discharge Note:Pt discharged home alert and oriented x4, skin warm to touch midline telfa drging, rlq whitley drain intact,Pt taught jpdrain teaching. Pt verbalized undwerstqanding of discharge teaching.

## 2018-08-15 ENCOUNTER — OFFICE VISIT (OUTPATIENT)
Dept: SURGERY | Facility: CLINIC | Age: 63
End: 2018-08-15
Payer: COMMERCIAL

## 2018-08-15 VITALS
TEMPERATURE: 98 F | DIASTOLIC BLOOD PRESSURE: 79 MMHG | BODY MASS INDEX: 38.56 KG/M2 | HEIGHT: 60 IN | HEART RATE: 73 BPM | SYSTOLIC BLOOD PRESSURE: 128 MMHG | WEIGHT: 196.44 LBS

## 2018-08-15 DIAGNOSIS — Z98.890 S/P HERNIA REPAIR: Primary | ICD-10-CM

## 2018-08-15 DIAGNOSIS — Z87.19 S/P HERNIA REPAIR: Primary | ICD-10-CM

## 2018-08-15 PROCEDURE — 99999 PR PBB SHADOW E&M-EST. PATIENT-LVL III: CPT | Mod: PBBFAC,,, | Performed by: SURGERY

## 2018-08-15 PROCEDURE — 99024 POSTOP FOLLOW-UP VISIT: CPT | Mod: S$GLB,,, | Performed by: SURGERY

## 2018-08-15 RX ORDER — CEPHALEXIN 500 MG/1
500 CAPSULE ORAL EVERY 6 HOURS
Qty: 28 CAPSULE | Refills: 0 | Status: SHIPPED | OUTPATIENT
Start: 2018-08-15 | End: 2018-08-22

## 2018-08-15 NOTE — PROGRESS NOTES
HPI:  The patient is status post-incisional hernia on 8/7/18. She is doing well, denies pain. Surgical bandage was still intact. Eating well, ambulating. She is wearing abdominal binder.    PHYSICAL EXAM:  Physical Exam   Constitutional: She appears well-developed.   Eyes: Conjunctivae are normal.   Cardiovascular: Normal rate and regular rhythm.   Pulmonary/Chest: Effort normal.   Abdominal: Soft. There is no tenderness.   Obese  Blanching erythema around incision  Incision c/d/i  ZEESHAN drain with ss output   Neurological: She is alert.   Skin: Skin is warm.       ASSESSMENT:    The patient is doing well after surgery.     PLAN:    Follow up 1 week for staple removal.  ZEESHAN drain removed  Keflex sent to pharmacy for erythema    Umu Fernández MD  Surgery Resident, PGY-3  Pager [phone]  08/15/2018

## 2018-08-22 ENCOUNTER — OFFICE VISIT (OUTPATIENT)
Dept: SURGERY | Facility: CLINIC | Age: 63
End: 2018-08-22
Payer: COMMERCIAL

## 2018-08-22 VITALS
TEMPERATURE: 98 F | HEIGHT: 60 IN | BODY MASS INDEX: 38.24 KG/M2 | DIASTOLIC BLOOD PRESSURE: 67 MMHG | WEIGHT: 194.75 LBS | SYSTOLIC BLOOD PRESSURE: 138 MMHG

## 2018-08-22 DIAGNOSIS — Z98.890 S/P REPAIR OF RECURRENT VENTRAL HERNIA: Primary | ICD-10-CM

## 2018-08-22 DIAGNOSIS — Z87.19 S/P REPAIR OF RECURRENT VENTRAL HERNIA: Primary | ICD-10-CM

## 2018-08-22 PROCEDURE — 99999 PR PBB SHADOW E&M-EST. PATIENT-LVL III: CPT | Mod: PBBFAC,,, | Performed by: SURGERY

## 2018-08-22 PROCEDURE — 99024 POSTOP FOLLOW-UP VISIT: CPT | Mod: S$GLB,,, | Performed by: SURGERY

## 2018-08-22 NOTE — PROGRESS NOTES
SUBJECTIVE:  The patient is a 63 y.o. y/o female 2 weeks s/p ex lap for ventral hernia. She denies pain, fevers, chills, nausea, vomiting, diarrhea, or constipation. Eating well with normal appetite and bowel function. Denies redness around or drainage from incisions. Finished her last day of abx today.    OBJECTIVE:  GEN: female in NAD  ABD: soft, non-tender, non-distended  INCISIONS: staples in place - clean, dry and intact; healing well without signs of infection or hernia    ASSESSMENT/PLAN:  Doing well 2 weeks s/p ventral hernia repair. Staples removed in clinic, patient tolerated it well. Patient is advised to avoid heavy lifting or strenuous activity for another 2-4 weeks. May resume light cardio. Patient may bathe and continue to take a regular diet. Will follow-up with me on an as-needed basis. All questions answered; patient is comfortable with follow-up plan.

## 2018-08-29 ENCOUNTER — TELEPHONE (OUTPATIENT)
Dept: FAMILY MEDICINE | Facility: CLINIC | Age: 63
End: 2018-08-29

## 2018-08-29 DIAGNOSIS — I10 ESSENTIAL HYPERTENSION: ICD-10-CM

## 2018-08-29 DIAGNOSIS — E78.5 HYPERLIPIDEMIA, UNSPECIFIED HYPERLIPIDEMIA TYPE: ICD-10-CM

## 2018-08-29 DIAGNOSIS — Z12.31 ENCOUNTER FOR SCREENING MAMMOGRAM FOR BREAST CANCER: Primary | ICD-10-CM

## 2018-08-29 NOTE — TELEPHONE ENCOUNTER
----- Message from Beau Daley sent at 8/29/2018  4:16 PM CDT -----  Contact: Self/[phone]  The patient would like orders placed for blood work mammogram.    Thank you

## 2018-08-30 NOTE — TELEPHONE ENCOUNTER
Spoke with the pt's .  Scheduled fasting labs and mailed an appt.  Patient verbalized understandings.

## 2018-10-17 ENCOUNTER — TELEPHONE (OUTPATIENT)
Dept: FAMILY MEDICINE | Facility: CLINIC | Age: 63
End: 2018-10-17

## 2018-10-17 DIAGNOSIS — Z12.31 SCREENING MAMMOGRAM, ENCOUNTER FOR: Primary | ICD-10-CM

## 2018-10-17 NOTE — TELEPHONE ENCOUNTER
----- Message from Rosa M Fernandez sent at 10/17/2018  1:09 PM CDT -----  Contact: Self   Pt requesting a 3D mammo gram. [phone]

## 2018-10-17 NOTE — TELEPHONE ENCOUNTER
Spoke with patient and she is requesting to do the Tomosynthesis  CAD mammogram. She said that this is what she has been doing in the past and is welling to pay the extra $50.00. Appointment scheduled with appointment slip in the mail.

## 2018-11-02 ENCOUNTER — PATIENT MESSAGE (OUTPATIENT)
Dept: FAMILY MEDICINE | Facility: CLINIC | Age: 63
End: 2018-11-02

## 2018-11-02 ENCOUNTER — HOSPITAL ENCOUNTER (OUTPATIENT)
Dept: RADIOLOGY | Facility: HOSPITAL | Age: 63
Discharge: HOME | End: 2018-11-02
Attending: INTERNAL MEDICINE
Payer: COMMERCIAL

## 2018-11-02 VITALS — WEIGHT: 194 LBS | BODY MASS INDEX: 38.09 KG/M2 | HEIGHT: 60 IN

## 2018-11-02 DIAGNOSIS — Z12.31 SCREENING MAMMOGRAM, ENCOUNTER FOR: ICD-10-CM

## 2018-11-02 DIAGNOSIS — T75.3XXA MOTION SICKNESS, INITIAL ENCOUNTER: Primary | ICD-10-CM

## 2018-11-02 PROCEDURE — 77063 BREAST TOMOSYNTHESIS BI: CPT | Mod: TC

## 2018-11-02 PROCEDURE — 77063 BREAST TOMOSYNTHESIS BI: CPT | Mod: 26,,, | Performed by: RADIOLOGY

## 2018-11-02 PROCEDURE — 77067 SCR MAMMO BI INCL CAD: CPT | Mod: 26,,, | Performed by: RADIOLOGY

## 2018-11-02 RX ORDER — SCOLOPAMINE TRANSDERMAL SYSTEM 1 MG/1
1 PATCH, EXTENDED RELEASE TRANSDERMAL
Qty: 4 PATCH | Refills: 1 | Status: SHIPPED | OUTPATIENT
Start: 2018-11-02 | End: 2018-12-28

## 2018-11-05 ENCOUNTER — CLINICAL SUPPORT (OUTPATIENT)
Dept: FAMILY MEDICINE | Facility: CLINIC | Age: 63
End: 2018-11-05
Payer: COMMERCIAL

## 2018-11-05 DIAGNOSIS — Z23 NEED FOR PROPHYLACTIC VACCINATION AND INOCULATION AGAINST INFLUENZA: Primary | ICD-10-CM

## 2018-11-05 PROCEDURE — 90686 IIV4 VACC NO PRSV 0.5 ML IM: CPT | Mod: S$GLB,,, | Performed by: INTERNAL MEDICINE

## 2018-11-05 PROCEDURE — 99499 UNLISTED E&M SERVICE: CPT | Mod: S$GLB,,, | Performed by: INTERNAL MEDICINE

## 2018-11-05 PROCEDURE — 90471 IMMUNIZATION ADMIN: CPT | Mod: S$GLB,,, | Performed by: INTERNAL MEDICINE

## 2018-11-05 PROCEDURE — 99999 PR PBB SHADOW E&M-EST. PATIENT-LVL I: CPT | Mod: PBBFAC,,,

## 2018-11-08 ENCOUNTER — HOSPITAL ENCOUNTER (OUTPATIENT)
Dept: RADIOLOGY | Facility: HOSPITAL | Age: 63
Discharge: HOME | End: 2018-11-08
Attending: INTERNAL MEDICINE
Payer: COMMERCIAL

## 2018-11-08 DIAGNOSIS — R92.8 ABNORMAL MAMMOGRAM OF RIGHT BREAST: ICD-10-CM

## 2018-11-08 PROCEDURE — 77065 DX MAMMO INCL CAD UNI: CPT | Mod: 26,,, | Performed by: RADIOLOGY

## 2018-11-08 PROCEDURE — 77061 BREAST TOMOSYNTHESIS UNI: CPT | Mod: TC

## 2018-11-08 PROCEDURE — 77061 BREAST TOMOSYNTHESIS UNI: CPT | Mod: 26,,, | Performed by: RADIOLOGY

## 2018-11-08 PROCEDURE — 77065 DX MAMMO INCL CAD UNI: CPT | Mod: TC

## 2018-11-15 ENCOUNTER — LAB VISIT (OUTPATIENT)
Dept: LAB | Facility: HOSPITAL | Age: 63
End: 2018-11-15
Attending: INTERNAL MEDICINE
Payer: COMMERCIAL

## 2018-11-15 DIAGNOSIS — E78.5 HYPERLIPIDEMIA, UNSPECIFIED HYPERLIPIDEMIA TYPE: ICD-10-CM

## 2018-11-15 LAB
ALBUMIN SERPL BCP-MCNC: 3.5 G/DL
ALP SERPL-CCNC: 75 U/L
ALT SERPL W/O P-5'-P-CCNC: 17 U/L
ANION GAP SERPL CALC-SCNC: 8 MMOL/L
AST SERPL-CCNC: 15 U/L
BILIRUB SERPL-MCNC: 1 MG/DL
BUN SERPL-MCNC: 19 MG/DL
CALCIUM SERPL-MCNC: 9 MG/DL
CHLORIDE SERPL-SCNC: 106 MMOL/L
CHOLEST SERPL-MCNC: 208 MG/DL
CHOLEST/HDLC SERPL: 3.6 {RATIO}
CO2 SERPL-SCNC: 25 MMOL/L
CREAT SERPL-MCNC: 0.8 MG/DL
EST. GFR  (AFRICAN AMERICAN): >60 ML/MIN/1.73 M^2
EST. GFR  (NON AFRICAN AMERICAN): >60 ML/MIN/1.73 M^2
GLUCOSE SERPL-MCNC: 98 MG/DL
HDLC SERPL-MCNC: 57 MG/DL
HDLC SERPL: 27.4 %
LDLC SERPL CALC-MCNC: 129.6 MG/DL
NONHDLC SERPL-MCNC: 151 MG/DL
POTASSIUM SERPL-SCNC: 3.9 MMOL/L
PROT SERPL-MCNC: 7.3 G/DL
SODIUM SERPL-SCNC: 139 MMOL/L
TRIGL SERPL-MCNC: 107 MG/DL

## 2018-11-15 PROCEDURE — 36415 COLL VENOUS BLD VENIPUNCTURE: CPT | Mod: PO

## 2018-11-15 PROCEDURE — 80061 LIPID PANEL: CPT

## 2018-11-15 PROCEDURE — 80053 COMPREHEN METABOLIC PANEL: CPT

## 2018-11-28 ENCOUNTER — OFFICE VISIT (OUTPATIENT)
Dept: FAMILY MEDICINE | Facility: CLINIC | Age: 63
End: 2018-11-28
Payer: COMMERCIAL

## 2018-11-28 ENCOUNTER — LAB VISIT (OUTPATIENT)
Dept: LAB | Facility: HOSPITAL | Age: 63
End: 2018-11-28
Attending: INTERNAL MEDICINE
Payer: COMMERCIAL

## 2018-11-28 VITALS
BODY MASS INDEX: 39.84 KG/M2 | TEMPERATURE: 98 F | OXYGEN SATURATION: 98 % | DIASTOLIC BLOOD PRESSURE: 80 MMHG | WEIGHT: 202.94 LBS | HEART RATE: 59 BPM | SYSTOLIC BLOOD PRESSURE: 148 MMHG | HEIGHT: 60 IN

## 2018-11-28 DIAGNOSIS — M06.00 SERONEGATIVE RHEUMATOID ARTHRITIS: ICD-10-CM

## 2018-11-28 DIAGNOSIS — I10 ESSENTIAL HYPERTENSION: ICD-10-CM

## 2018-11-28 DIAGNOSIS — J30.9 ALLERGIC RHINITIS: ICD-10-CM

## 2018-11-28 DIAGNOSIS — Z12.4 SCREENING FOR CERVICAL CANCER: ICD-10-CM

## 2018-11-28 DIAGNOSIS — G47.33 OSA (OBSTRUCTIVE SLEEP APNEA): ICD-10-CM

## 2018-11-28 DIAGNOSIS — Z71.89 ADVANCED DIRECTIVES, COUNSELING/DISCUSSION: ICD-10-CM

## 2018-11-28 DIAGNOSIS — E78.5 HYPERLIPIDEMIA, UNSPECIFIED HYPERLIPIDEMIA TYPE: ICD-10-CM

## 2018-11-28 DIAGNOSIS — E66.01 SEVERE OBESITY (BMI 35.0-39.9) WITH COMORBIDITY: ICD-10-CM

## 2018-11-28 DIAGNOSIS — Z01.419 ENCOUNTER FOR GYNECOLOGICAL EXAMINATION WITHOUT ABNORMAL FINDING: ICD-10-CM

## 2018-11-28 DIAGNOSIS — Z23 NEED FOR SHINGLES VACCINE: ICD-10-CM

## 2018-11-28 DIAGNOSIS — Z12.11 COLON CANCER SCREENING: ICD-10-CM

## 2018-11-28 DIAGNOSIS — Z00.00 ROUTINE MEDICAL EXAM: Primary | ICD-10-CM

## 2018-11-28 LAB
CRP SERPL-MCNC: 5.8 MG/L
ERYTHROCYTE [SEDIMENTATION RATE] IN BLOOD BY WESTERGREN METHOD: 27 MM/HR

## 2018-11-28 PROCEDURE — 88175 CYTOPATH C/V AUTO FLUID REDO: CPT

## 2018-11-28 PROCEDURE — 3077F SYST BP >= 140 MM HG: CPT | Mod: CPTII,S$GLB,, | Performed by: INTERNAL MEDICINE

## 2018-11-28 PROCEDURE — 3079F DIAST BP 80-89 MM HG: CPT | Mod: CPTII,S$GLB,, | Performed by: INTERNAL MEDICINE

## 2018-11-28 PROCEDURE — 85652 RBC SED RATE AUTOMATED: CPT

## 2018-11-28 PROCEDURE — 36415 COLL VENOUS BLD VENIPUNCTURE: CPT | Mod: PO

## 2018-11-28 PROCEDURE — 99999 PR PBB SHADOW E&M-EST. PATIENT-LVL IV: CPT | Mod: PBBFAC,,, | Performed by: INTERNAL MEDICINE

## 2018-11-28 PROCEDURE — 87624 HPV HI-RISK TYP POOLED RSLT: CPT

## 2018-11-28 PROCEDURE — 86140 C-REACTIVE PROTEIN: CPT

## 2018-11-28 PROCEDURE — 99396 PREV VISIT EST AGE 40-64: CPT | Mod: S$GLB,,, | Performed by: INTERNAL MEDICINE

## 2018-11-28 RX ORDER — FLUTICASONE PROPIONATE 50 MCG
SPRAY, SUSPENSION (ML) NASAL
Qty: 16 G | Refills: 5 | Status: SHIPPED | OUTPATIENT
Start: 2018-11-28 | End: 2019-12-04 | Stop reason: SDUPTHER

## 2018-11-28 NOTE — PROGRESS NOTES
HISTORY OF PRESENT ILLNESS:  Apoorva Fernandez is a 63 y.o. female who presents to the clinic today for a routine physical exam. Her last physical exam was approximately 1 years(s) ago.        PAST MEDICAL HISTORY:  Past Medical History:   Diagnosis Date    Allergic rhinitis     Allergy     Arthritis     Hyperlipidemia     Diet-controlled    Hypertension     Joint pain     Keloid cicatrix     Obesity     Osteoarthritis     Personal history of colonic polyps 2010    Adenomatous    Personal history of peptic ulcer disease     Umbilical hernia        PAST SURGICAL HISTORY:  Past Surgical History:   Procedure Laterality Date    Abdominal/umbilical hernia repair      removed small part of small bowel; mesh placed     SECTION, LOW TRANSVERSE      X2    CHOLECYSTECTOMY      COLONOSCOPY N/A 2013    Performed by Suhail Saab MD at [facility] ENDO    Open laparoscopy for a ruptured ulcer      REPAIR,HERNIA,INCISIONAL OR VENTRAL,WITHOUT HISTORY OF PRIOR REPAIR Open with Mesh N/A 2018    Performed by Pancho Montes MD at [facility] OR [address] [place] [address]       SOCIAL HISTORY:  Social History     Socioeconomic History    Marital status:      Spouse name: Not on file    Number of children: 2    Years of education: Not on file    Highest education level: Not on file   Social Needs    Financial resource strain: Not on file    Food insecurity - worry: Not on file    Food insecurity - inability: Not on file    Transportation needs - medical: Not on file    Transportation needs - non-medical: Not on file   Occupational History    Occupation:    Tobacco Use    Smoking status: Never Smoker    Smokeless tobacco: Never Used   Substance and Sexual Activity    Alcohol use: Yes     Alcohol/week: 1.2 - 1.8 oz     Types: 2 - 3 Glasses of wine per week     Comment: Socially on the weekends    Drug use: No    Sexual activity: Yes     Partners: Male   Other Topics Concern     Are you pregnant or think you may be? Not Asked    Breast-feeding Not Asked   Social History Narrative    Not on file       FAMILY HISTORY:  Family History   Problem Relation Age of Onset    Breast cancer Mother          at age 65    Cancer Father         Bladder    Glaucoma Father     Hypertension Father     Heart disease Father     Obesity Sister     Heart disease Brother     Sleep apnea Brother     Diabetes Neg Hx     Melanoma Neg Hx        ALLERGIES AND MEDICATIONS: updated and reviewed.  Review of patient's allergies indicates:   Allergen Reactions    Antihistamines - alkylamine Shortness Of Breath    Lipitor  [atorvastatin]      Other reaction(s): myalgia    Zetia  [ezetimibe]      Other reaction(s): muscle cramps        Medication List           Accurate as of 18  2:32 PM. If you have any questions, ask your nurse or doctor.               CONTINUE taking these medications    aspirin 81 MG EC tablet  Commonly known as:  ECOTRIN     docusate sodium 50 MG capsule  Commonly known as:  COLACE  Take 1 capsule (50 mg total) by mouth 2 (two) times daily.     famotidine 20 MG tablet  Commonly known as:  PEPCID     fluticasone 50 mcg/actuation nasal spray  Commonly known as:  FLONASE  SPRAY 1 SPRAY INTO EACH NOSTRIL ONCE EVERY DAY     ibuprofen 800 MG tablet  Commonly known as:  ADVIL,MOTRIN  Take 1 tablet (800 mg total) by mouth 3 (three) times daily as needed for Pain.     lidocaine 5 %  Commonly known as:  LIDODERM     omega 3-dha-epa-fish oil 1,000 mg (120 mg-180 mg) Cap     ondansetron 4 MG Tbdl  Commonly known as:  ZOFRAN-ODT  Take 1 tablet (4 mg total) by mouth every 6 (six) hours as needed (Nausea).     ONE-A-DAY WOMEN'S 50+ ORAL     oxyCODONE-acetaminophen 5-325 mg per tablet  Commonly known as:  PERCOCET  Take 1 tablet by mouth every 4 (four) hours as needed.     scopolamine 1.3-1.5 mg (1 mg over 3 days)  Commonly known as:  TRANSDERM-SCOP  Place 1 patch onto the skin every 72  hours.           Where to Get Your Medications      These medications were sent to [facility] - [place] [address] [facility] [address]  [address] [place] [address]    Hours:  24-hours Phone:  [phone]   · fluticasone 50 mcg/actuation nasal spray           CARE TEAM:  Patient Care Team:  Najma Rangel MD as PCP - General (Internal Medicine)           SCREENING HISTORY:  Health Maintenance       Date Due Completion Date    Zoster Vaccine 01/08/2014 ---    Pap Smear with HPV Cotest 06/29/2018 6/29/2015    Override on 8/9/2011: Done    TETANUS VACCINE 07/15/2018 7/15/2008    Colonoscopy 11/25/2018 11/25/2013    Override on 8/3/2010: Done    Mammogram 11/08/2019 11/8/2018    Lipid Panel 11/15/2023 11/15/2018            REVIEW OF SYSTEMS:   The patient reports: they are improving their dietary habits - she has lost about 40 lbs since her hernia repair; she did gain back 10 lbs on her recent cruise.  The patient reports : that they do not exercise. She hopes to start exercising in the near future.  Review of Systems   Constitutional: Negative for activity change, chills, fatigue, fever and unexpected weight change.   HENT: Negative for congestion, hearing loss, postnasal drip, rhinorrhea and trouble swallowing.    Eyes: Negative for pain, discharge and visual disturbance.   Respiratory: Negative for cough, chest tightness, shortness of breath and wheezing.    Cardiovascular: Negative for chest pain, palpitations and leg swelling.   Gastrointestinal: Negative for abdominal pain, blood in stool, constipation, diarrhea, nausea and vomiting.   Endocrine: Negative for polydipsia and polyuria.   Genitourinary: Negative for difficulty urinating, dysuria, hematuria and menstrual problem.   Musculoskeletal: Positive for joint swelling. Negative for arthralgias, back pain and neck pain.   Skin: Negative for rash.   Neurological: Negative for weakness and headaches.    Psychiatric/Behavioral: Negative for confusion, dysphoric mood and sleep disturbance. The patient is not nervous/anxious.      Breast ROS: negative for breast lumps (was recently recalled after mammogram but further workup was ok)            Physical Examination:   Vitals:    11/28/18 1337   BP: (!) 148/80   Pulse: (!) 59   Temp: 97.9 °F (36.6 °C)     Weight: 92 kg (202 lb 14.9 oz)   Height: 5' (152.4 cm)   Body mass index is 39.63 kg/m².      Patient declined to have a chaperone present during the exam today.  General appearance - alert, well appearing, and in no distress and obese  Mental status - alert, oriented to person, place, and time, normal mood, behavior, speech, dress, motor activity, and thought processes  Eyes - pupils equal and reactive, extraocular eye movements intact, sclera anicteric  Mouth - mucous membranes moist, pharynx normal without lesions  Neck - supple, no significant adenopathy, carotids upstroke normal bilaterally, no bruits, thyroid exam: thyroid is normal in size without nodules or tenderness  Lymphatics - no palpable lymphadenopathy  Chest - clear to auscultation, no wheezes, rales or rhonchi, symmetric air entry  Heart - normal rate and regular rhythm, no gallops noted  Abdomen - soft, nontender, nondistended, no masses or organomegaly  Breasts - not examined  Pelvic - normal external genitalia, vulva, vagina, cervix, uterus and adnexa; mild atrophic vaginitis  Back exam - full range of motion, no tenderness, palpable spasm or pain on motion  Neurological - alert, oriented, normal speech, no focal findings or movement disorder noted, cranial nerves II through XII intact  Musculoskeletal - no joint tenderness, deformity or swelling, no muscular tenderness noted  Extremities - peripheral pulses normal, no pedal edema, no clubbing or cyanosis  Skin - normal coloration and turgor, no rashes, no suspicious skin lesions noted      Results for orders placed or performed in visit on  11/15/18   Lipid panel   Result Value Ref Range    Cholesterol 208 (H) 120 - 199 mg/dL    Triglycerides 107 30 - 150 mg/dL    HDL 57 40 - 75 mg/dL    LDL Cholesterol 129.6 63.0 - 159.0 mg/dL    HDL/Chol Ratio 27.4 20.0 - 50.0 %    Total Cholesterol/HDL Ratio 3.6 2.0 - 5.0    Non-HDL Cholesterol 151 mg/dL   Comprehensive metabolic panel   Result Value Ref Range    Sodium 139 136 - 145 mmol/L    Potassium 3.9 3.5 - 5.1 mmol/L    Chloride 106 95 - 110 mmol/L    CO2 25 23 - 29 mmol/L    Glucose 98 70 - 110 mg/dL    BUN, Bld 19 8 - 23 mg/dL    Creatinine 0.8 0.5 - 1.4 mg/dL    Calcium 9.0 8.7 - 10.5 mg/dL    Total Protein 7.3 6.0 - 8.4 g/dL    Albumin 3.5 3.5 - 5.2 g/dL    Total Bilirubin 1.0 0.1 - 1.0 mg/dL    Alkaline Phosphatase 75 55 - 135 U/L    AST 15 10 - 40 U/L    ALT 17 10 - 44 U/L    Anion Gap 8 8 - 16 mmol/L    eGFR if African American >60.0 >60 mL/min/1.73 m^2    eGFR if non African American >60.0 >60 mL/min/1.73 m^2          ASSESSMENT AND PLAN:  1. Routine medical exam  Counseled on age appropriate medical preventative services including age appropriate cancer screenings, age appropriate eye and dental exams, over all nutritional health, need for a consistent exercise regimen, and an over all push towards maintaining a vigorous and active lifestyle.  Counseled on age appropriate vaccines and discussed upcoming health care needs based on age/gender. Discussed good sleep hygiene and stress management.     2. Essential hypertension  Blood pressure is not controlled today. We discussed low salt diet and regular exercise. Patient reports that they have not taken any decongestant or anti-inflammatory medication recently (patient educated that these medications can increase blood pressure).  Medication changes made today: None. Patient will come back in 2-4 weeks for recheck of blood pressure by nursing staff. Patient also asked to check blood pressure at home and bring recordings to next office visit. Patient did  not want to enroll in the digital hypertension program at this time.    I discussed with her that untreated sleep apnea may make it difficult to control BP.    3. Hyperlipidemia, unspecified hyperlipidemia type  We discussed low fat diet and regular exercise. No need for prescription medication at this time.     4. BELLA (obstructive sleep apnea)  CPAP compliance: no - will refer her to sleep medicine for re-evaluation since she has had some weight loss.  We discussed the potential ramifications of untreated sleep apnea, which could include daytime sleepiness, hypertension, heart disease including CHF, sudden death while sleeping and/or stroke. The patient was advised to abstain from driving should they feel sleepy or drowsy.  We discussed potential treatment options, which could include weight loss, body positioning, continuous positive airway pressure (CPAP), or referral for surgical consideration.    - Ambulatory consult to Sleep Disorders    5. Allergic rhinitis  We discussed several treatment strategies: antihistamine at bedtime, flonase in the morning. We also discussed saline nasal rinse in the evening as needed. I recommended allergy covers for pillow and mattress. Patient will let me know if symptoms worsen or persist.   - fluticasone (FLONASE) 50 mcg/actuation nasal spray; SPRAY 1 SPRAY INTO EACH NOSTRIL ONCE EVERY DAY  Dispense: 16 g; Refill: 5    6. Severe obesity (BMI 35.0-39.9) with comorbidity  The patient is asked to continue to make an attempt to improve diet and exercise patterns to aid in medical management of this problem.     7. Seronegative rheumatoid arthritis  The patient states she could never tolerate any of the medication prescribed to her by Rheumatology.  She states overall her symptoms are much improved since she has had weight loss.  She will hold off on follow-up with Rheumatology at this time.  I will check some inflammatory markers and address results accordingly.  - Sedimentation  rate; Future  - C-reactive protein; Future    8. Encounter for gynecological examination without abnormal finding/9. Screening for cervical cancer    - Liquid-based pap smear, screening  - HPV High Risk Genotypes, PCR    10. Need for shingles vaccine  Patient was advised to get immunization at the pharmacy.     11. Colon cancer screening    - Case request GI: COLONOSCOPY    12. Advanced directives, counseling/discussion  She will bring paperwork from home on adv directives.          Follow-up in about 6 months (around 5/28/2019), or if symptoms worsen or fail to improve, for follow up chronic medical conditions.. or sooner as needed.

## 2018-12-04 LAB
HPV HR 12 DNA CVX QL NAA+PROBE: NEGATIVE
HPV16 AG SPEC QL: NEGATIVE
HPV18 DNA SPEC QL NAA+PROBE: NEGATIVE

## 2018-12-12 ENCOUNTER — CLINICAL SUPPORT (OUTPATIENT)
Dept: FAMILY MEDICINE | Facility: CLINIC | Age: 63
End: 2018-12-12
Payer: COMMERCIAL

## 2018-12-12 VITALS — SYSTOLIC BLOOD PRESSURE: 138 MMHG | DIASTOLIC BLOOD PRESSURE: 80 MMHG | HEART RATE: 68 BPM | OXYGEN SATURATION: 98 %

## 2018-12-12 DIAGNOSIS — I10 ESSENTIAL HYPERTENSION: Primary | ICD-10-CM

## 2018-12-12 PROCEDURE — 99999 PR PBB SHADOW E&M-EST. PATIENT-LVL II: CPT | Mod: PBBFAC,,,

## 2018-12-12 PROCEDURE — 99499 UNLISTED E&M SERVICE: CPT | Mod: S$GLB,,, | Performed by: INTERNAL MEDICINE

## 2018-12-12 NOTE — PROGRESS NOTES
Apoorva Fernandez 63 y.o. female is here today for Blood Pressure check.   History of HTN no.    Review of patient's allergies indicates:   Allergen Reactions    Antihistamines - alkylamine Shortness Of Breath    Lipitor  [atorvastatin]      Other reaction(s): myalgia    Zetia  [ezetimibe]      Other reaction(s): muscle cramps     Creatinine   Date Value Ref Range Status   11/15/2018 0.8 0.5 - 1.4 mg/dL Final     Sodium   Date Value Ref Range Status   11/15/2018 139 136 - 145 mmol/L Final     Potassium   Date Value Ref Range Status   11/15/2018 3.9 3.5 - 5.1 mmol/L Final   ]  Patient denies taking blood pressure medications on a regular basis at the same time of the day. Patient is not currently on blood pressure medication.     Current Outpatient Medications:     aspirin (ECOTRIN) 81 MG EC tablet, Take 81 mg by mouth once daily., Disp: , Rfl:     docusate sodium (COLACE) 50 MG capsule, Take 1 capsule (50 mg total) by mouth 2 (two) times daily., Disp: , Rfl: 0    famotidine (PEPCID) 20 MG tablet, Take 20 mg by mouth 2 (two) times daily., Disp: , Rfl:     fluticasone (FLONASE) 50 mcg/actuation nasal spray, SPRAY 1 SPRAY INTO EACH NOSTRIL ONCE EVERY DAY, Disp: 16 g, Rfl: 5    ibuprofen (ADVIL,MOTRIN) 800 MG tablet, Take 1 tablet (800 mg total) by mouth 3 (three) times daily as needed for Pain., Disp: 21 tablet, Rfl: 0    lidocaine (LIDODERM) 5 %, Place 1 patch onto the skin every 24 hours. Remove & Discard patch within 12 hours or as directed by MD, Disp: , Rfl:     MULTIVITS-MIN/FA/CA CARB/VIT K (ONE-A-DAY WOMEN'S 50+ ORAL), Take 1 tablet by mouth once daily., Disp: , Rfl:     omega 3-dha-epa-fish oil 1,000 (120-180) mg Cap, Take 1 capsule by mouth once daily., Disp: , Rfl:     ondansetron (ZOFRAN-ODT) 4 MG TbDL, Take 1 tablet (4 mg total) by mouth every 6 (six) hours as needed (Nausea)., Disp: 10 tablet, Rfl: 2    oxyCODONE-acetaminophen (PERCOCET) 5-325 mg per tablet, Take 1 tablet by mouth every 4  (four) hours as needed., Disp: 42 tablet, Rfl: 0    scopolamine (TRANSDERM-SCOP) 1.3-1.5 mg (1 mg over 3 days), Place 1 patch onto the skin every 72 hours., Disp: 4 patch, Rfl: 1  Does patient have record of home blood pressure readings yes. Readings have been averaging 130's-140's/70's-80's.   Last dose of blood pressure medication was taken at N/A.  Patient is asymptomatic.   Complains of none.    Vitals:    12/12/18 0923   BP: 138/80   BP Location: Left arm   Patient Position: Sitting   BP Method: Large (Manual)   Pulse: 68   SpO2: 98%         Dr. Rangel notified.

## 2018-12-28 ENCOUNTER — OFFICE VISIT (OUTPATIENT)
Dept: PULMONOLOGY | Facility: CLINIC | Age: 63
End: 2018-12-28
Payer: COMMERCIAL

## 2018-12-28 VITALS
BODY MASS INDEX: 40.62 KG/M2 | OXYGEN SATURATION: 97 % | HEART RATE: 67 BPM | DIASTOLIC BLOOD PRESSURE: 85 MMHG | TEMPERATURE: 98 F | WEIGHT: 206.88 LBS | SYSTOLIC BLOOD PRESSURE: 141 MMHG | HEIGHT: 60 IN

## 2018-12-28 DIAGNOSIS — G47.33 OSA (OBSTRUCTIVE SLEEP APNEA): Primary | ICD-10-CM

## 2018-12-28 PROCEDURE — 3077F SYST BP >= 140 MM HG: CPT | Mod: CPTII,S$GLB,, | Performed by: NURSE PRACTITIONER

## 2018-12-28 PROCEDURE — 3079F DIAST BP 80-89 MM HG: CPT | Mod: CPTII,S$GLB,, | Performed by: NURSE PRACTITIONER

## 2018-12-28 PROCEDURE — 99214 OFFICE O/P EST MOD 30 MIN: CPT | Mod: S$GLB,,, | Performed by: NURSE PRACTITIONER

## 2018-12-28 PROCEDURE — 3008F BODY MASS INDEX DOCD: CPT | Mod: CPTII,S$GLB,, | Performed by: NURSE PRACTITIONER

## 2018-12-28 NOTE — LETTER
December 29, 2018      Najma Rangel MD  [address] [facility]  [place] [address]           [facility]  [address] [facility] [address]  [place] [address]  Phone: [phone]  Fax: [phone]          Patient: Apoorva Fernandez   MR Number: [id number]   YOB: 1955   Date of Visit: 12/28/2018       Dear Dr. Najma Rangel:    Thank you for referring Apoorva Fernandez to me for evaluation. Attached you will find relevant portions of my assessment and plan of care.    If you have questions, please do not hesitate to call me. I look forward to following Apoorva Fernandez along with you.    Sincerely,    Bethany Hernandez, CARLOS    Enclosure  CC:  No Recipients    If you would like to receive this communication electronically, please contact [email or web address] or [phone] to request more information on [employer] Link access.    For providers and/or their staff who would like to refer a patient to [facility], please contact us through our one-stop-shop provider referral line, [facility], [place], at [phone].    If you feel you have received this communication in error or would no longer like to receive these types of communications, please e-mail [email or web address]

## 2018-12-28 NOTE — PATIENT INSTRUCTIONS
Equipment :    Durable Medical Equipment -[facility] [place] [phone]     Ally Richardson, RT office [phone]    Plan:   1. Start auto PAP therapy, Compliance requirement on machine 4 hours or more 70% nights (22/30 days)  2. Follow up in 6 weeks after usage to assess effectiveness    Education: During our discussion today, we talked about the etiology of obstructive sleep apnea as well as the potential ramifications of untreated sleep apnea, which could include daytime sleepiness, dementia, cognitive impairment, hypertension, heart disease and/or stroke. We discussed potential treatment options, which could include weight loss, body positioning, oral appliances (OA), continuous positive airway pressure (CPAP), or referral for surgical consideration.     Behavior modification which includes losing weight, exercising, changing the sleep position, abstaining from alcohol, and avoiding certain medications    Precautions: The patient was advised to abstain from driving should they feel sleepy or drowsy    CPAP DESENSITIZATION if difficulty adhering to machine:    Step 1:  Wear the CPAP  at home while awake for 1-2 hour each day.  Practice breathing through the mask while watching television, reading, or performing another sedentary activity that keeps your mind off your anxiety.     Step 2: Use the CPAP   during scheduled one hour naps at home.     Step 3: If can tolerate CPAP for 2 hours daily while awake, start sleeping with it. Use CPAP   during the initial 4.5 hours of nocturnal sleep.     Step 4: Use CPAP  through the entire night of sleep.

## 2018-12-29 NOTE — PROGRESS NOTES
CHIEF COMPLAINT:    Chief Complaint   Patient presents with    Sleep Apnea     CPAP f/u       HISTORY OF PRESENT ILLNESS: Apoorva Fernandez is a 63 y.o. female with Morbid obesity, HTN, HLD, OA, AR, BELLA, and ventral hernias s/p repair is here for  for management of obstructive sleep apnea. She was diagnosed with BELLA 7/2016 AHI 61. Last seen in clinic 2016 by Dr. Fuchs and trial on APAP 8-15. She tried it briefly for less than 1 hour and states she felt claustrophobic and returned the machine. On interim, she reports 40 pound weight loss following  ventral hernia repairs and retired from her job as school . She reports improved sleep. Still snores but reports feeling rested now upon awakening and no witnessed apnea.     [place] Sleepiness Scale score during today's visit was 5 .  [place] SLEEPINESS SCALE 12/29/2018   Sitting and reading 0   Watching TV 1   Sitting, inactive in a public place (e.g. a theatre or a meeting) 0   As a passenger in a car for an hour without a break 1   Lying down to rest in the afternoon when circumstances permit 3   Sitting and talking to someone 0   Sitting quietly after a lunch without alcohol 0   In a car, while stopped for a few minutes in traffic 0   Total score 5         PAST MEDICAL HISTORY:    Active Ambulatory Problems     Diagnosis Date Noted    Hyperlipidemia     Personal history of peptic ulcer disease     Severe obesity (BMI 35.0-39.9) with comorbidity     Allergic rhinitis 06/09/2014    HTN (hypertension) 04/21/2015    Postmenopausal bleeding 06/29/2015    Primary osteoarthritis of both knees 03/11/2016    BELLA (obstructive sleep apnea)     Seronegative rheumatoid arthritis 05/01/2017    Primary osteoarthritis involving multiple joints 08/14/2017    Incisional hernia 08/07/2018     Resolved Ambulatory Problems     Diagnosis Date Noted    Umbilical hernia     Personal history of colonic polyps 08/01/2010    Headache 04/21/2015    Numbness  2015    Sleep disturbance      Past Medical History:   Diagnosis Date    Allergic rhinitis     Allergy     Arthritis     Hyperlipidemia     Hypertension     Joint pain     Keloid cicatrix     Obesity     Osteoarthritis     Personal history of colonic polyps 2010    Personal history of peptic ulcer disease     Umbilical hernia                 PAST SURGICAL HISTORY:    Past Surgical History:   Procedure Laterality Date    Abdominal/umbilical hernia repair      removed small part of small bowel; mesh placed     SECTION, LOW TRANSVERSE      X2    CHOLECYSTECTOMY      COLONOSCOPY N/A 2013    [facility]    Open laparoscopy for a ruptured ulcer      REPAIR,HERNIA,INCISIONAL OR VENTRAL,WITHOUT HISTORY OF PRIOR REPAIR Open with Mesh N/A 2018    Performed by Pancho Montes MD at [facility] OR [address] [place] [address]         FAMILY HISTORY:                Family History   Problem Relation Age of Onset    Breast cancer Mother          at age 65    Cancer Father         Bladder    Glaucoma Father     Hypertension Father     Heart disease Father     Obesity Sister     Heart disease Brother     Sleep apnea Brother     Diabetes Neg Hx     Melanoma Neg Hx        SOCIAL HISTORY:          Tobacco:   Social History     Tobacco Use   Smoking Status Never Smoker   Smokeless Tobacco Never Used       alcohol use:    Social History     Substance and Sexual Activity   Alcohol Use Yes    Alcohol/week: 1.2 - 1.8 oz    Types: 2 - 3 Glasses of wine per week    Comment: Socially on the weekends                 Occupation: retired school     ALLERGIES:    Review of patient's allergies indicates:   Allergen Reactions    Antihistamines - alkylamine Shortness Of Breath    Lipitor  [atorvastatin]      Other reaction(s): myalgia    Zetia  [ezetimibe]      Other reaction(s): muscle cramps       CURRENT MEDICATIONS:    Current Outpatient  Medications   Medication Sig Dispense Refill    aspirin (ECOTRIN) 81 MG EC tablet Take 81 mg by mouth once daily.      docusate sodium (COLACE) 50 MG capsule Take 1 capsule (50 mg total) by mouth 2 (two) times daily.  0    famotidine (PEPCID) 20 MG tablet Take 20 mg by mouth 2 (two) times daily.      fluticasone (FLONASE) 50 mcg/actuation nasal spray SPRAY 1 SPRAY INTO EACH NOSTRIL ONCE EVERY DAY 16 g 5    lidocaine (LIDODERM) 5 % Place 1 patch onto the skin every 24 hours. Remove & Discard patch within 12 hours or as directed by MD Paco MCKEON-MIN/FA/CA CARB/VIT K (ONE-A-DAY WOMEN'S 50+ ORAL) Take 1 tablet by mouth once daily.      omega 3-dha-epa-fish oil 1,000 (120-180) mg Cap Take 1 capsule by mouth once daily.       No current facility-administered medications for this visit.                   REVIEW OF SYSTEMS:   Review of Systems   Constitutional: Negative for activity change and appetite change.   HENT: Positive for congestion (on flonase).    Respiratory: Positive for snoring. Negative for apnea and somnolence.    Cardiovascular: Negative for chest pain, palpitations and leg swelling.   Genitourinary:        Wakes up to go to bathroom once a night   Musculoskeletal: Negative for gait problem.   Gastrointestinal: Negative for acid reflux.   Psychiatric/Behavioral: Negative for sleep disturbance.        PHYSICAL EXAM:  Vitals:    12/28/18 1011   BP: (!) 141/85   Pulse: 67   Temp: 97.6 °F (36.4 °C)   TempSrc: Oral   SpO2: 97%   Weight: 93.8 kg (206 lb 14.4 oz)   Height: 5' (1.524 m)   PainSc: 0-No pain     Body mass index is 40.41 kg/m².   Physical Exam   Constitutional: She appears well-developed. No distress. She is obese.   HENT:   Head: Normocephalic.   Mouth/Throat: Oropharynx is clear and moist. Mallampati Score: IV.   Neck: Neck supple.   16.75 in   Cardiovascular: Normal rate, regular rhythm and normal heart sounds.   Pulmonary/Chest: Normal expansion, effort normal and breath sounds normal.    Musculoskeletal: She exhibits edema (trace).   Skin: She is not diaphoretic.                                               DATA :  Lab Results   Component Value Date    TSH 2.225 04/21/2015   CXR 2014: The heart is not enlarged.  The lungs are clear and well aerated  7/27/16 ahi of 60  ASSESSMENT    ICD-10-CM ICD-9-CM    1. BELLA (obstructive sleep apnea) G47.33 327.23 CPAP FOR HOME USE       PLAN:    Sleep Apnea. The patient symptomatically has snoring with findings of obesity, elevated BP. PSG in 2016 confirms BELLA. Recommend trial APAP at lower pressures,  on desensitization. May need requalifying study.       Education: During our discussion today, we talked about the etiology of obstructive sleep apnea as well as the potential ramifications of untreated sleep apnea, which could include daytime sleepiness, dementia, cognitive impairment, hypertension, heart disease and/or stroke. We discussed potential treatment options, which could include weight loss, body positioning, oral appliances (OA), continuous positive airway pressure (CPAP), or referral for surgical consideration.     Behavior modification which includes losing weight, exercising, changing the sleep position, abstaining from alcohol, and avoiding certain medications    Precautions: The patient was advised to abstain from driving should they feel sleepy or drowsy      Patient will Follow-up follow up in 6 weeks after cpap usage .

## 2019-01-29 ENCOUNTER — TELEPHONE (OUTPATIENT)
Dept: PULMONOLOGY | Facility: CLINIC | Age: 64
End: 2019-01-29

## 2019-01-29 DIAGNOSIS — G47.33 OSA (OBSTRUCTIVE SLEEP APNEA): ICD-10-CM

## 2019-01-29 NOTE — TELEPHONE ENCOUNTER
Pt inform that she will need new home sleep study in order to qualify for cpap again. Pt states can't afford at moment. She will contact us back when she has funding. In the meantime, she is working on diet, exercise and weight loss.

## 2019-02-05 DIAGNOSIS — Z12.11 COLON CANCER SCREENING: Primary | ICD-10-CM

## 2019-03-26 ENCOUNTER — ANESTHESIA EVENT (OUTPATIENT)
Dept: ENDOSCOPY | Facility: HOSPITAL | Age: 64
End: 2019-03-26
Payer: COMMERCIAL

## 2019-03-26 ENCOUNTER — HOSPITAL ENCOUNTER (OUTPATIENT)
Facility: HOSPITAL | Age: 64
Discharge: HOME | End: 2019-03-26
Attending: INTERNAL MEDICINE | Admitting: INTERNAL MEDICINE
Payer: COMMERCIAL

## 2019-03-26 ENCOUNTER — ANESTHESIA (OUTPATIENT)
Dept: ENDOSCOPY | Facility: HOSPITAL | Age: 64
End: 2019-03-26
Payer: COMMERCIAL

## 2019-03-26 VITALS
BODY MASS INDEX: 39.85 KG/M2 | HEIGHT: 60 IN | HEART RATE: 52 BPM | RESPIRATION RATE: 20 BRPM | TEMPERATURE: 98 F | SYSTOLIC BLOOD PRESSURE: 151 MMHG | WEIGHT: 203 LBS | OXYGEN SATURATION: 100 % | DIASTOLIC BLOOD PRESSURE: 78 MMHG

## 2019-03-26 DIAGNOSIS — Z12.11 COLON CANCER SCREENING: ICD-10-CM

## 2019-03-26 PROBLEM — T46.6X5A STATIN MYOPATHY: Status: ACTIVE | Noted: 2019-03-26

## 2019-03-26 PROBLEM — G72.0 STATIN MYOPATHY: Status: ACTIVE | Noted: 2019-03-26

## 2019-03-26 PROCEDURE — G0105 COLORECTAL SCRN; HI RISK IND: HCPCS | Mod: ,,, | Performed by: INTERNAL MEDICINE

## 2019-03-26 PROCEDURE — D9220A PRA ANESTHESIA: ICD-10-PCS | Mod: ANES,,, | Performed by: ANESTHESIOLOGY

## 2019-03-26 PROCEDURE — D9220A PRA ANESTHESIA: Mod: CRNA,,, | Performed by: NURSE ANESTHETIST, CERTIFIED REGISTERED

## 2019-03-26 PROCEDURE — 63600175 PHARM REV CODE 636 W HCPCS: Performed by: NURSE ANESTHETIST, CERTIFIED REGISTERED

## 2019-03-26 PROCEDURE — 37000009 HC ANESTHESIA EA ADD 15 MINS: Performed by: INTERNAL MEDICINE

## 2019-03-26 PROCEDURE — 25000003 PHARM REV CODE 250: Performed by: ANESTHESIOLOGY

## 2019-03-26 PROCEDURE — G0105 COLORECTAL SCRN; HI RISK IND: HCPCS | Performed by: INTERNAL MEDICINE

## 2019-03-26 PROCEDURE — G0121 COLON CA SCRN NOT HI RSK IND: HCPCS | Performed by: INTERNAL MEDICINE

## 2019-03-26 PROCEDURE — D9220A PRA ANESTHESIA: ICD-10-PCS | Mod: CRNA,,, | Performed by: NURSE ANESTHETIST, CERTIFIED REGISTERED

## 2019-03-26 PROCEDURE — 37000008 HC ANESTHESIA 1ST 15 MINUTES: Performed by: INTERNAL MEDICINE

## 2019-03-26 PROCEDURE — D9220A PRA ANESTHESIA: Mod: ANES,,, | Performed by: ANESTHESIOLOGY

## 2019-03-26 PROCEDURE — G0105 COLORECTAL SCRN; HI RISK IND: ICD-10-PCS | Mod: ,,, | Performed by: INTERNAL MEDICINE

## 2019-03-26 RX ORDER — PROPOFOL 10 MG/ML
VIAL (ML) INTRAVENOUS
Status: DISCONTINUED
Start: 2019-03-26 | End: 2019-03-26 | Stop reason: HOSPADM

## 2019-03-26 RX ORDER — SODIUM CHLORIDE 9 MG/ML
INJECTION, SOLUTION INTRAVENOUS CONTINUOUS
Status: DISCONTINUED | OUTPATIENT
Start: 2019-03-26 | End: 2019-03-26 | Stop reason: HOSPADM

## 2019-03-26 RX ORDER — LIDOCAINE HYDROCHLORIDE 20 MG/ML
INJECTION, SOLUTION EPIDURAL; INFILTRATION; INTRACAUDAL; PERINEURAL
Status: DISCONTINUED
Start: 2019-03-26 | End: 2019-03-26 | Stop reason: HOSPADM

## 2019-03-26 RX ORDER — LIDOCAINE HCL/PF 100 MG/5ML
SYRINGE (ML) INTRAVENOUS
Status: DISCONTINUED | OUTPATIENT
Start: 2019-03-26 | End: 2019-03-26

## 2019-03-26 RX ORDER — LIDOCAINE HYDROCHLORIDE 10 MG/ML
1 INJECTION, SOLUTION EPIDURAL; INFILTRATION; INTRACAUDAL; PERINEURAL ONCE
Status: DISCONTINUED | OUTPATIENT
Start: 2019-03-26 | End: 2019-03-26 | Stop reason: HOSPADM

## 2019-03-26 RX ORDER — PROPOFOL 10 MG/ML
VIAL (ML) INTRAVENOUS
Status: DISCONTINUED | OUTPATIENT
Start: 2019-03-26 | End: 2019-03-26

## 2019-03-26 RX ADMIN — PROPOFOL 20 MG: 10 INJECTION, EMULSION INTRAVENOUS at 09:03

## 2019-03-26 RX ADMIN — PROPOFOL 30 MG: 10 INJECTION, EMULSION INTRAVENOUS at 08:03

## 2019-03-26 RX ADMIN — PROPOFOL 100 MG: 10 INJECTION, EMULSION INTRAVENOUS at 08:03

## 2019-03-26 RX ADMIN — PROPOFOL 40 MG: 10 INJECTION, EMULSION INTRAVENOUS at 09:03

## 2019-03-26 RX ADMIN — LIDOCAINE HYDROCHLORIDE 100 MG: 20 INJECTION, SOLUTION INTRAVENOUS at 08:03

## 2019-03-26 RX ADMIN — PROPOFOL 30 MG: 10 INJECTION, EMULSION INTRAVENOUS at 09:03

## 2019-03-26 RX ADMIN — PROPOFOL 20 MG: 10 INJECTION, EMULSION INTRAVENOUS at 08:03

## 2019-03-26 RX ADMIN — SODIUM CHLORIDE: 0.9 INJECTION, SOLUTION INTRAVENOUS at 08:03

## 2019-03-26 NOTE — TRANSFER OF CARE
Anesthesia Transfer of Care Note    Patient: Apoorva Fernandez    Procedure(s) Performed: Procedure(s) (LRB):  COLONOSCOPY (N/A)    Patient location: GI    Anesthesia Type: general    Transport from OR: Transported from OR on room air with adequate spontaneous ventilation    Post pain: adequate analgesia    Post assessment: no apparent anesthetic complications    Post vital signs: stable    Level of consciousness: sedated and responds to stimulation    Nausea/Vomiting: no nausea/vomiting    Complications: none    Transfer of care protocol was followed      Last vitals:   Visit Vitals  /64 (BP Location: Left arm, Patient Position: Lying)   Pulse (!) 59   Temp 36.8 °C (98.2 °F) (Oral)   Resp 17   Ht 5' (1.524 m)   Wt 92.1 kg (203 lb)   LMP 08/22/2015 (Exact Date)   SpO2 98%   BMI 39.65 kg/m²

## 2019-03-26 NOTE — ANESTHESIA PREPROCEDURE EVALUATION
03/26/2019    Pre-operative evaluation for Procedure(s) (LRB):  COLONOSCOPY (N/A)    Apoorva Fernandez is a 64 y.o. female     Patient Active Problem List   Diagnosis    Hyperlipidemia    Personal history of peptic ulcer disease    Severe obesity (BMI 35.0-39.9) with comorbidity    Allergic rhinitis    HTN (hypertension)    Postmenopausal bleeding    Primary osteoarthritis of both knees    BELLA (obstructive sleep apnea)    Seronegative rheumatoid arthritis    Primary osteoarthritis involving multiple joints    Incisional hernia    Colon cancer screening       Review of patient's allergies indicates:   Allergen Reactions    Antihistamines - alkylamine Shortness Of Breath       No current facility-administered medications on file prior to encounter.      Current Outpatient Medications on File Prior to Encounter   Medication Sig Dispense Refill    aspirin (ECOTRIN) 81 MG EC tablet Take 81 mg by mouth once daily.      docusate sodium (COLACE) 50 MG capsule Take 1 capsule (50 mg total) by mouth 2 (two) times daily.  0    famotidine (PEPCID) 20 MG tablet Take 20 mg by mouth 2 (two) times daily.      fluticasone (FLONASE) 50 mcg/actuation nasal spray SPRAY 1 SPRAY INTO EACH NOSTRIL ONCE EVERY DAY 16 g 5    lidocaine (LIDODERM) 5 % Place 1 patch onto the skin every 24 hours. Remove & Discard patch within 12 hours or as directed by MD      MULTIVITS-MIN/FA/CA CARB/VIT K (ONE-A-DAY WOMEN'S 50+ ORAL) Take 1 tablet by mouth once daily.      omega 3-dha-epa-fish oil 1,000 (120-180) mg Cap Take 1 capsule by mouth once daily.         VITALS  Vitals:    03/26/19 0841   BP: 123/76   Pulse: 60   Resp: 20   Temp: 36.8 °C (98.2 °F)       LABS  CBC  Lab Results   Component Value Date    WBC 4.97 06/27/2018    HGB 13.3 06/27/2018    HCT 40.7 06/27/2018    MCV 93 06/27/2018     06/27/2018     BMP  Lab  Results   Component Value Date     11/15/2018    K 3.9 11/15/2018     11/15/2018    CO2 25 11/15/2018    BUN 19 11/15/2018    CREATININE 0.8 11/15/2018    CALCIUM 9.0 11/15/2018    ANIONGAP 8 11/15/2018    ESTGFRAFRICA >60.0 11/15/2018    EGFRNONAA >60.0 11/15/2018       Anesthesia Evaluation    I have reviewed the Patient Summary Reports.     I have reviewed the Medications.     Review of Systems  Anesthesia Hx:  No problems with previous Anesthesia  History of prior surgery of interest to airway management or planning: Denies Family Hx of Anesthesia complications.   Denies Personal Hx of Anesthesia complications.   Social:  Non-Smoker, Social Alcohol Use    Hematology/Oncology:  Hematology Normal   Oncology Normal     EENT/Dental:EENT/Dental Normal   Cardiovascular:   Hypertension hyperlipidemia    Pulmonary:   Sleep Apnea    Hepatic/GI:   GERD, well controlled    Musculoskeletal:   Arthritis   Joint Disease:  Arthritis, Osteoarthritis, Rheumatoid Arthritis    Neurological:  Neurology Normal  Osteoarthritis, Rheumatoid Arthritis    Endocrine:  Endocrine Normal  Metabolic Disorders, Obesity / BMI > 30      Physical Exam  General:  Well nourished, Obesity    Airway/Jaw/Neck:  Airway Findings: Tongue: Normal General Airway Assessment: Possible difficult intubation  TM Distance: < 4 cm  Jaw/Neck Findings:  Neck Findings:  Short Neck, Girth Increased     Eyes/Ears/Nose:  Eyes/Ears/Nose Findings:     Chest/Lungs:  Chest/Lungs Findings: Normal Respiratory Rate     Heart/Vascular:  Heart Findings: Rate: Normal  Rhythm: Regular Rhythm        Mental Status:  Mental Status Findings:  Cooperative, Alert and Oriented         Anesthesia Plan  Type of Anesthesia, risks & benefits discussed:  Anesthesia Type:  general  Patient's Preference:   Intra-op Monitoring Plan: standard ASA monitors  Intra-op Monitoring Plan Comments:   Post Op Pain Control Plan:   Post Op Pain Control Plan Comments:   Induction:   IV  Beta  Blocker:  Patient is not currently on a Beta-Blocker (No further documentation required).       Informed Consent: Patient understands risks and agrees with Anesthesia plan.  Questions answered. Anesthesia consent signed with patient.  ASA Score: 3     Day of Surgery Review of History & Physical: I have interviewed and examined the patient. I have reviewed the patient's H&P dated:            Ready For Surgery From Anesthesia Perspective.

## 2019-03-26 NOTE — DISCHARGE INSTRUCTIONS
Understanding Diverticulosis and Diverticulitis     Pouches or diverticula usually occur in the lower part of the colon called the sigmoid.     The colon (large intestine) is the last part of the digestive tract. It absorbs water from stool and changes it from a liquid to a solid. In certain cases, small pouches called diverticula can form in the colon wall. This condition is called diverticulosis. The pouches can become infected. If this happens, it becomes a more serious problem called diverticulitis. These problems can be painful. But they can be managed.  Managing your condition  Diet changes or medicines may be prescribed.   If you have diverticulosis  Recommendations include:  · Diet changes are often enough to control symptoms. The main changes are adding fiber (roughage) and drinking more water. Fiber absorbs water as it travels through your colon. This helps your stool stay soft and move smoothly. Water helps this process.  · If needed, you may be told to take over-the-counter stool softeners.  · To help relieve pain, antispasmodic medicines may be prescribed.  · Watch for changes in your bowel movements. Tell the healthcare provider if you notice any changes.  · Begin an exercise program. Ask your healthcare provider how to get started.  · Get plenty of rest and sleep.   If you have diverticulitis  Treatment depends on how bad your symptoms are.  · For mild symptoms. You may be put on a liquid diet for a short time. Antibiotics are usually prescribed. If these two steps relieve your symptoms, you may then be prescribed a high-fiber diet. If you still have symptoms, your healthcare provider will discuss more treatment choices with you.  · For severe symptoms. You may need to be admitted to the hospital. There, you can be given IV antibiotics and fluids. You will also be put on a low-fiber or liquid diet. Although not common, surgery is needed in some people with severe symptoms.  [place] to colon health      Diverticulitis occurs when the pouches become infected or inflamed.     Help keep your colon healthy with a diet that includes plenty of high-fiber fruits, vegetables, and whole grains. Drink plenty of liquids like water and juice. Maintain a healthy lifestyle including regular exercise, stress management, and adequate rest and sleep.   Date Last Reviewed: 7/1/2016  © 5580-2674 [employer]. [address] [place] [address], [place] [address]. All rights reserved. This information is not intended as a substitute for professional medical care. Always follow your healthcare professional's instructions.

## 2019-03-26 NOTE — [employer] PATIENT INSTRUCTIONS
Discharge Summary/Instructions after an Endoscopic Procedure  Patient Name: Apoorva Fernandez  Patient MRN: [id number]  Patient YOB: 1955 Tuesday, March 26, 2019  Chandrika Ho MD  RESTRICTIONS:  During your procedure today, you received medications for sedation.  These   medications may affect your judgment, balance and coordination.  Therefore,   for 24 hours, you have the following restrictions:   - DO NOT drive a car, operate machinery, make legal/financial decisions,   sign important papers or drink alcohol.    ACTIVITY:  Today: no heavy lifting, straining or running due to procedural   sedation/anesthesia.  The following day: return to full activity including work.  DIET:  Eat and drink normally unless instructed otherwise.     TREATMENT FOR COMMON SIDE EFFECTS:  - Mild abdominal pain, nausea, belching, bloating or excessive gas:  rest,   eat lightly and use a heating pad.  - Sore Throat: treat with throat lozenges and/or gargle with warm salt   water.  - Because air was used during the procedure, expelling large amounts of air   from your rectum or belching is normal.  - If a bowel prep was taken, you may not have a bowel movement for 1-3 days.    This is normal.  SYMPTOMS TO WATCH FOR AND REPORT TO YOUR PHYSICIAN:  1. Abdominal pain or bloating, other than gas cramps.  2. Chest pain.  3. Back pain.  4. Signs of infection such as: chills or fever occurring within 24 hours   after the procedure.  5. Rectal bleeding, which would show as bright red, maroon, or black stools.   (A tablespoon of blood from the rectum is not serious, especially if   hemorrhoids are present.)  6. Vomiting.  7. Weakness or dizziness.  GO DIRECTLY TO THE NEAREST EMERGENCY ROOM IF YOU HAVE ANY OF THE FOLLOWING:      Difficulty breathing              Chills and/or fever over 101 F   Persistent vomiting and/or vomiting blood   Severe abdominal pain   Severe chest pain   Black, tarry stools   Bleeding- more than one  tablespoon   Any other symptom or condition that you feel may need urgent attention  Your doctor recommends these additional instructions:  If any biopsies were taken, your doctors clinic will contact you in 1 to 2   weeks with any results.  - Discharge patient to home (ambulatory).   - Resume previous diet.   - Continue present medications.   - Repeat colonoscopy in 5 years for surveillance.  For questions, problems or results please call your physician -   Chandrika Ho MD at Work:  [phone].  [facility] can be reached at [phone]     IF A COMPLICATION OR EMERGENCY SITUATION ARISES AND YOU ARE UNABLE TO REACH   YOUR PHYSICIAN - GO DIRECTLY TO THE EMERGENCY ROOM.  MD Chandrika Purcell MD  3/26/2019 9:19:54 AM  This report has been verified and signed electronically.  [employer]

## 2019-03-26 NOTE — ANESTHESIA POSTPROCEDURE EVALUATION
Anesthesia Post Evaluation    Patient: Apoorva Fernandez    Procedure(s) Performed: Procedure(s) (LRB):  COLONOSCOPY (N/A)    Final Anesthesia Type: general  Patient location during evaluation: GI PACU  Patient participation: Yes- Able to Participate  Level of consciousness: awake and alert and oriented  Post-procedure vital signs: reviewed and stable  Pain management: adequate  Airway patency: patent  PONV status at discharge: No PONV  Anesthetic complications: no      Cardiovascular status: blood pressure returned to baseline and hemodynamically stable  Respiratory status: unassisted, spontaneous ventilation and room air  Hydration status: euvolemic  Follow-up not needed.          Vitals Value Taken Time   /76 3/26/2019  9:28 AM   Temp 36.8 °C (98.2 °F) 3/26/2019  9:13 AM   Pulse 52 3/26/2019  9:28 AM   Resp 20 3/26/2019  9:28 AM   SpO2 96 % 3/26/2019  9:28 AM         No case tracking events are documented in the log.      Pain/Thomas Score: Thomas Score: 9 (3/26/2019  9:13 AM)  Modified Thomas Score: 19 (3/26/2019  9:13 AM)

## 2019-03-26 NOTE — H&P
[facility]  History & Physical - Short Stay  Gastroenterology      SUBJECTIVE:     Procedure: Colonoscopy    Chief Complaint/Reason for Admission: Screening    History of Present Illness:  Apoorva Fernandez is a 64 y.o. female comes for colorectal cancer screening. Patient is asymptomatic. No constipation or diarrhea or have rectal bleeding. No family history of colon cancer. Patient does not take anticoagulation. Patient had a polyp removed during her first colonoscopy. Her last colonoscopy done about five years ago was normal (Per patient).  No family history of colon cancer.    PTA Medications   Medication Sig    aspirin (ECOTRIN) 81 MG EC tablet Take 81 mg by mouth once daily.    docusate sodium (COLACE) 50 MG capsule Take 1 capsule (50 mg total) by mouth 2 (two) times daily.    famotidine (PEPCID) 20 MG tablet Take 20 mg by mouth 2 (two) times daily.    fluticasone (FLONASE) 50 mcg/actuation nasal spray SPRAY 1 SPRAY INTO EACH NOSTRIL ONCE EVERY DAY    lidocaine (LIDODERM) 5 % Place 1 patch onto the skin every 24 hours. Remove & Discard patch within 12 hours or as directed by MD    MULTIVITS-MIN/FA/CA CARB/VIT K (ONE-A-DAY WOMEN'S 50+ ORAL) Take 1 tablet by mouth once daily.    omega 3-dha-epa-fish oil 1,000 (120-180) mg Cap Take 1 capsule by mouth once daily.       Review of patient's allergies indicates:   Allergen Reactions    Antihistamines - alkylamine Shortness Of Breath        Past Medical History:   Diagnosis Date    Allergic rhinitis     Allergy     Arthritis     Hyperlipidemia     Diet-controlled    Hypertension     Joint pain     Keloid cicatrix     Obesity     Osteoarthritis     Personal history of colonic polyps 2010    Adenomatous    Personal history of peptic ulcer disease     Umbilical hernia      Past Surgical History:   Procedure Laterality Date    Abdominal/umbilical hernia repair      removed small part of small bowel; mesh placed      SECTION, LOW TRANSVERSE      X2    CHOLECYSTECTOMY      COLONOSCOPY N/A 2013    [facility]    Open laparoscopy for a ruptured ulcer      REPAIR,HERNIA,INCISIONAL OR VENTRAL,WITHOUT HISTORY OF PRIOR REPAIR Open with Mesh N/A 2018    Performed by Pancho Montes MD at [facility] OR [address] [place] [address]     Family History   Problem Relation Age of Onset    Breast cancer Mother          at age 65    Cancer Father         Bladder    Glaucoma Father     Hypertension Father     Heart disease Father     Obesity Sister     Heart disease Brother     Sleep apnea Brother     Diabetes Neg Hx     Melanoma Neg Hx      Social History     Tobacco Use    Smoking status: Never Smoker    Smokeless tobacco: Never Used   Substance Use Topics    Alcohol use: Yes     Alcohol/week: 1.2 - 1.8 oz     Types: 2 - 3 Glasses of wine per week     Comment: Socially on the weekends    Drug use: No       Review of Systems:  Constitutional: no fever or chills  Eyes: no visual changes  ENT: no nasal congestion or sore throat  Respiratory: no cough or shorness of breath  Cardiovascular: no chest pain or palpitations  Gastrointestinal: no nausea or vomiting, no abdominal pain or change in bowel habits  Genitourinary: no hematuria or dysuria      OBJECTIVE:     Physical Exam:  General: Well developed, well nourished, no apparent distress  Vital Signs Range (Last 24H):     HEENT: Anicteric, PERRLA  CVS: S1, S2, no murmers/rubs  Lungs: CTA-B, normal excursion  Abdomen: Soft, NT, ND, normal BS's  Extremities: No c/c/e, 1+ DP pulses to BLE's  Skin: No rashes/lesions.      Labs:  No results for input(s): GLUCOSE, CALCIUM, PROT, NA, K, CO2, CL, BUN, CREATININE, ALKPHOS, ALT, AST, BILITOT in the last 24 hours.    Invalid input(s):  ALBUMIN  No results found for this or any previous visit (from the past 336 hour(s)).  No results for input(s): PT, INR, APTT in the last 24 hours.          ASSESSMENT/PLAN:      Colorectal cancer screening    Assessment:  Patient Active Problem List   Diagnosis    Hyperlipidemia    Personal history of peptic ulcer disease    Severe obesity (BMI 35.0-39.9) with comorbidity    Allergic rhinitis    HTN (hypertension)    Postmenopausal bleeding    Primary osteoarthritis of both knees    BELLA (obstructive sleep apnea)    Seronegative rheumatoid arthritis    Primary osteoarthritis involving multiple joints    Incisional hernia    Colon cancer screening       Plan: Colonoscopy  I have discussed the procedure and its complications in detail with the patient and his brother who came him. They understood and consented for the procedure.      TAYO OMER M.D.

## 2019-08-23 ENCOUNTER — PATIENT MESSAGE (OUTPATIENT)
Dept: FAMILY MEDICINE | Facility: CLINIC | Age: 64
End: 2019-08-23

## 2019-08-23 ENCOUNTER — TELEPHONE (OUTPATIENT)
Dept: FAMILY MEDICINE | Facility: CLINIC | Age: 64
End: 2019-08-23

## 2019-08-23 DIAGNOSIS — R92.8 ABNORMALITY OF RIGHT BREAST ON SCREENING MAMMOGRAM: Primary | ICD-10-CM

## 2019-08-23 DIAGNOSIS — E78.5 HYPERLIPIDEMIA, UNSPECIFIED HYPERLIPIDEMIA TYPE: Primary | ICD-10-CM

## 2019-08-23 DIAGNOSIS — I10 ESSENTIAL HYPERTENSION: ICD-10-CM

## 2019-08-23 NOTE — TELEPHONE ENCOUNTER
----- Message from Snowbo Mccormick sent at 8/23/2019 12:49 PM CDT -----  Contact: Self  Type: Lab    Caller is requesting to schedule their Lab appointment prior to annual appointment.    Order is not listed in EPIC.  Please enter order and contact patient to schedule.    Name of Caller:Self    Preferred Date and Time of Labs:week before appt    Date of EPP Appointment:12/4    Where would they like the lab performed?[facility]    Would the patient rather a call back or a response via [facility]? Call back    Best Call Back Number:[phone]

## 2019-11-04 ENCOUNTER — HOSPITAL ENCOUNTER (OUTPATIENT)
Dept: RADIOLOGY | Facility: HOSPITAL | Age: 64
Discharge: HOME | End: 2019-11-04
Attending: INTERNAL MEDICINE
Payer: COMMERCIAL

## 2019-11-04 VITALS — BODY MASS INDEX: 39.85 KG/M2 | HEIGHT: 60 IN | WEIGHT: 203 LBS

## 2019-11-04 DIAGNOSIS — R92.8 ABNORMALITY OF RIGHT BREAST ON SCREENING MAMMOGRAM: ICD-10-CM

## 2019-11-04 DIAGNOSIS — Z12.31 BREAST CANCER SCREENING BY MAMMOGRAM: ICD-10-CM

## 2019-11-04 PROCEDURE — 77067 MAMMO DIGITAL SCREENING BILAT WITH TOMOSYNTHESIS_CAD: ICD-10-PCS | Mod: 26,,, | Performed by: RADIOLOGY

## 2019-11-04 PROCEDURE — 77063 BREAST TOMOSYNTHESIS BI: CPT | Mod: 26,,, | Performed by: RADIOLOGY

## 2019-11-04 PROCEDURE — 77067 SCR MAMMO BI INCL CAD: CPT | Mod: TC

## 2019-11-04 PROCEDURE — 77067 SCR MAMMO BI INCL CAD: CPT | Mod: 26,,, | Performed by: RADIOLOGY

## 2019-11-04 PROCEDURE — 77063 MAMMO DIGITAL SCREENING BILAT WITH TOMOSYNTHESIS_CAD: ICD-10-PCS | Mod: 26,,, | Performed by: RADIOLOGY

## 2019-11-26 ENCOUNTER — LAB VISIT (OUTPATIENT)
Dept: LAB | Facility: HOSPITAL | Age: 64
End: 2019-11-26
Attending: INTERNAL MEDICINE
Payer: COMMERCIAL

## 2019-11-26 DIAGNOSIS — E78.5 HYPERLIPIDEMIA, UNSPECIFIED HYPERLIPIDEMIA TYPE: ICD-10-CM

## 2019-11-26 DIAGNOSIS — I10 ESSENTIAL HYPERTENSION: ICD-10-CM

## 2019-11-26 LAB
ALBUMIN SERPL BCP-MCNC: 3.5 G/DL (ref 3.5–5.2)
ALP SERPL-CCNC: 76 U/L (ref 55–135)
ALT SERPL W/O P-5'-P-CCNC: 17 U/L (ref 10–44)
ANION GAP SERPL CALC-SCNC: 7 MMOL/L (ref 8–16)
AST SERPL-CCNC: 15 U/L (ref 10–40)
BASOPHILS # BLD AUTO: 0.02 K/UL (ref 0–0.2)
BASOPHILS NFR BLD: 0.4 % (ref 0–1.9)
BILIRUB SERPL-MCNC: 0.8 MG/DL (ref 0.1–1)
BUN SERPL-MCNC: 17 MG/DL (ref 8–23)
CALCIUM SERPL-MCNC: 8.5 MG/DL (ref 8.7–10.5)
CHLORIDE SERPL-SCNC: 109 MMOL/L (ref 95–110)
CHOLEST SERPL-MCNC: 190 MG/DL (ref 120–199)
CHOLEST/HDLC SERPL: 3.2 {RATIO} (ref 2–5)
CO2 SERPL-SCNC: 25 MMOL/L (ref 23–29)
CREAT SERPL-MCNC: 0.8 MG/DL (ref 0.5–1.4)
DIFFERENTIAL METHOD: ABNORMAL
EOSINOPHIL # BLD AUTO: 0.3 K/UL (ref 0–0.5)
EOSINOPHIL NFR BLD: 5.3 % (ref 0–8)
ERYTHROCYTE [DISTWIDTH] IN BLOOD BY AUTOMATED COUNT: 13.2 % (ref 11.5–14.5)
EST. GFR  (AFRICAN AMERICAN): >60 ML/MIN/1.73 M^2
EST. GFR  (NON AFRICAN AMERICAN): >60 ML/MIN/1.73 M^2
GLUCOSE SERPL-MCNC: 102 MG/DL (ref 70–110)
HCT VFR BLD AUTO: 40.6 % (ref 37–48.5)
HDLC SERPL-MCNC: 60 MG/DL (ref 40–75)
HDLC SERPL: 31.6 % (ref 20–50)
HGB BLD-MCNC: 12.9 G/DL (ref 12–16)
IMM GRANULOCYTES # BLD AUTO: 0.01 K/UL (ref 0–0.04)
IMM GRANULOCYTES NFR BLD AUTO: 0.2 % (ref 0–0.5)
LDLC SERPL CALC-MCNC: 110.4 MG/DL (ref 63–159)
LYMPHOCYTES # BLD AUTO: 1.3 K/UL (ref 1–4.8)
LYMPHOCYTES NFR BLD: 25.6 % (ref 18–48)
MCH RBC QN AUTO: 31.2 PG (ref 27–31)
MCHC RBC AUTO-ENTMCNC: 31.8 G/DL (ref 32–36)
MCV RBC AUTO: 98 FL (ref 82–98)
MONOCYTES # BLD AUTO: 0.6 K/UL (ref 0.3–1)
MONOCYTES NFR BLD: 11.5 % (ref 4–15)
NEUTROPHILS # BLD AUTO: 2.9 K/UL (ref 1.8–7.7)
NEUTROPHILS NFR BLD: 57 % (ref 38–73)
NONHDLC SERPL-MCNC: 130 MG/DL
NRBC BLD-RTO: 0 /100 WBC
PLATELET # BLD AUTO: 250 K/UL (ref 150–350)
PMV BLD AUTO: 9.9 FL (ref 9.2–12.9)
POTASSIUM SERPL-SCNC: 4.4 MMOL/L (ref 3.5–5.1)
PROT SERPL-MCNC: 7 G/DL (ref 6–8.4)
RBC # BLD AUTO: 4.13 M/UL (ref 4–5.4)
SODIUM SERPL-SCNC: 141 MMOL/L (ref 136–145)
TRIGL SERPL-MCNC: 98 MG/DL (ref 30–150)
WBC # BLD AUTO: 5.12 K/UL (ref 3.9–12.7)

## 2019-11-26 PROCEDURE — 85025 COMPLETE CBC W/AUTO DIFF WBC: CPT

## 2019-11-26 PROCEDURE — 36415 COLL VENOUS BLD VENIPUNCTURE: CPT | Mod: PO

## 2019-11-26 PROCEDURE — 80061 LIPID PANEL: CPT

## 2019-11-26 PROCEDURE — 80053 COMPREHEN METABOLIC PANEL: CPT

## 2019-12-04 ENCOUNTER — OFFICE VISIT (OUTPATIENT)
Dept: FAMILY MEDICINE | Facility: CLINIC | Age: 64
End: 2019-12-04
Payer: COMMERCIAL

## 2019-12-04 ENCOUNTER — TELEPHONE (OUTPATIENT)
Dept: FAMILY MEDICINE | Facility: CLINIC | Age: 64
End: 2019-12-04

## 2019-12-04 VITALS
WEIGHT: 230.5 LBS | BODY MASS INDEX: 45.25 KG/M2 | SYSTOLIC BLOOD PRESSURE: 132 MMHG | DIASTOLIC BLOOD PRESSURE: 84 MMHG | OXYGEN SATURATION: 97 % | TEMPERATURE: 98 F | HEART RATE: 70 BPM | HEIGHT: 60 IN

## 2019-12-04 DIAGNOSIS — M17.0 PRIMARY OSTEOARTHRITIS OF BOTH KNEES: ICD-10-CM

## 2019-12-04 DIAGNOSIS — E66.01 SEVERE OBESITY (BMI 35.0-39.9) WITH COMORBIDITY: ICD-10-CM

## 2019-12-04 DIAGNOSIS — G47.33 OSA (OBSTRUCTIVE SLEEP APNEA): ICD-10-CM

## 2019-12-04 DIAGNOSIS — M06.00 SERONEGATIVE RHEUMATOID ARTHRITIS: ICD-10-CM

## 2019-12-04 DIAGNOSIS — Z23 FLU VACCINE NEED: Primary | ICD-10-CM

## 2019-12-04 DIAGNOSIS — M89.9 DISORDER OF BONE AND CARTILAGE: ICD-10-CM

## 2019-12-04 DIAGNOSIS — Z23 NEED FOR SHINGLES VACCINE: ICD-10-CM

## 2019-12-04 DIAGNOSIS — E78.5 HYPERLIPIDEMIA, UNSPECIFIED HYPERLIPIDEMIA TYPE: ICD-10-CM

## 2019-12-04 DIAGNOSIS — Z23 NEED FOR STREPTOCOCCUS PNEUMONIAE VACCINATION: ICD-10-CM

## 2019-12-04 DIAGNOSIS — Z71.89 ADVANCED DIRECTIVES, COUNSELING/DISCUSSION: ICD-10-CM

## 2019-12-04 DIAGNOSIS — I10 ESSENTIAL HYPERTENSION: ICD-10-CM

## 2019-12-04 DIAGNOSIS — M94.9 DISORDER OF BONE AND CARTILAGE: ICD-10-CM

## 2019-12-04 DIAGNOSIS — Z00.00 ROUTINE MEDICAL EXAM: ICD-10-CM

## 2019-12-04 DIAGNOSIS — G72.0 STATIN MYOPATHY: ICD-10-CM

## 2019-12-04 DIAGNOSIS — T46.6X5A STATIN MYOPATHY: ICD-10-CM

## 2019-12-04 DIAGNOSIS — J30.9 ALLERGIC RHINITIS: ICD-10-CM

## 2019-12-04 PROCEDURE — 90662 FLU VACCINE - HIGH DOSE (65+) PRESERVATIVE FREE IM: ICD-10-PCS | Mod: S$GLB,,, | Performed by: INTERNAL MEDICINE

## 2019-12-04 PROCEDURE — 90670 PCV13 VACCINE IM: CPT | Mod: S$GLB,,, | Performed by: INTERNAL MEDICINE

## 2019-12-04 PROCEDURE — 90471 FLU VACCINE - HIGH DOSE (65+) PRESERVATIVE FREE IM: ICD-10-PCS | Mod: S$GLB,,, | Performed by: INTERNAL MEDICINE

## 2019-12-04 PROCEDURE — 90670 PNEUMOCOCCAL CONJUGATE VACCINE 13-VALENT LESS THAN 5YO & GREATER THAN: ICD-10-PCS | Mod: S$GLB,,, | Performed by: INTERNAL MEDICINE

## 2019-12-04 PROCEDURE — 90472 PNEUMOCOCCAL CONJUGATE VACCINE 13-VALENT LESS THAN 5YO & GREATER THAN: ICD-10-PCS | Mod: S$GLB,,, | Performed by: INTERNAL MEDICINE

## 2019-12-04 PROCEDURE — 3079F DIAST BP 80-89 MM HG: CPT | Mod: CPTII,S$GLB,, | Performed by: INTERNAL MEDICINE

## 2019-12-04 PROCEDURE — 3079F PR MOST RECENT DIASTOLIC BLOOD PRESSURE 80-89 MM HG: ICD-10-PCS | Mod: CPTII,S$GLB,, | Performed by: INTERNAL MEDICINE

## 2019-12-04 PROCEDURE — 90471 IMMUNIZATION ADMIN: CPT | Mod: S$GLB,,, | Performed by: INTERNAL MEDICINE

## 2019-12-04 PROCEDURE — 99397 PER PM REEVAL EST PAT 65+ YR: CPT | Mod: 25,S$GLB,, | Performed by: INTERNAL MEDICINE

## 2019-12-04 PROCEDURE — 99397 PR PREVENTIVE VISIT,EST,65 & OVER: ICD-10-PCS | Mod: 25,S$GLB,, | Performed by: INTERNAL MEDICINE

## 2019-12-04 PROCEDURE — 3075F PR MOST RECENT SYSTOLIC BLOOD PRESS GE 130-139MM HG: ICD-10-PCS | Mod: CPTII,S$GLB,, | Performed by: INTERNAL MEDICINE

## 2019-12-04 PROCEDURE — 90662 IIV NO PRSV INCREASED AG IM: CPT | Mod: S$GLB,,, | Performed by: INTERNAL MEDICINE

## 2019-12-04 PROCEDURE — 3075F SYST BP GE 130 - 139MM HG: CPT | Mod: CPTII,S$GLB,, | Performed by: INTERNAL MEDICINE

## 2019-12-04 PROCEDURE — 90472 IMMUNIZATION ADMIN EACH ADD: CPT | Mod: S$GLB,,, | Performed by: INTERNAL MEDICINE

## 2019-12-04 PROCEDURE — 99999 PR PBB SHADOW E&M-EST. PATIENT-LVL IV: CPT | Mod: PBBFAC,,, | Performed by: INTERNAL MEDICINE

## 2019-12-04 PROCEDURE — 99999 PR PBB SHADOW E&M-EST. PATIENT-LVL IV: ICD-10-PCS | Mod: PBBFAC,,, | Performed by: INTERNAL MEDICINE

## 2019-12-04 RX ORDER — FLUTICASONE PROPIONATE 50 MCG
SPRAY, SUSPENSION (ML) NASAL
Qty: 16 G | Refills: 5 | Status: SHIPPED | OUTPATIENT
Start: 2019-12-04 | End: 2020-12-02

## 2019-12-04 NOTE — PROGRESS NOTES
HISTORY OF PRESENT ILLNESS:  Apoorva Fernandez is a 64 y.o. female who presents to the clinic today for a routine physical exam. Her last physical exam was approximately 1 years(s) ago.        PAST MEDICAL HISTORY:  Past Medical History:   Diagnosis Date    Allergic rhinitis     Allergy     Arthritis     Hyperlipidemia     Diet-controlled    Hypertension     Joint pain     Keloid cicatrix     Obesity     Osteoarthritis     Personal history of colonic polyps 2010    Adenomatous    Personal history of peptic ulcer disease     Umbilical hernia        PAST SURGICAL HISTORY:  Past Surgical History:   Procedure Laterality Date    Abdominal/umbilical hernia repair      removed small part of small bowel; mesh placed     SECTION, LOW TRANSVERSE      X2    CHOLECYSTECTOMY      COLONOSCOPY N/A 3/26/2019    Procedure: COLONOSCOPY;  Surgeon: Chandrika Ho MD;  Location: [facility];  Service: Endoscopy;  Laterality: N/A;    Open laparoscopy for a ruptured ulcer         SOCIAL HISTORY:  Social History     Socioeconomic History    Marital status:      Spouse name: Not on file    Number of children: 2    Years of education: Not on file    Highest education level: Not on file   Occupational History    Occupation:    Social Needs    Financial resource strain: Not hard at all    Food insecurity:     Worry: Never true     Inability: Never true    Transportation needs:     Medical: No     Non-medical: No   Tobacco Use    Smoking status: Never Smoker    Smokeless tobacco: Never Used   Substance and Sexual Activity    Alcohol use: Yes     Alcohol/week: 2.0 - 3.0 standard drinks     Types: 2 - 3 Glasses of wine per week     Frequency: 2-3 times a week     Drinks per session: 3 or 4     Binge frequency: Never     Comment: Socially on the weekends    Drug use: No    Sexual activity: Yes     Partners: Male   Lifestyle    Physical activity:     Days per week: 2  days     Minutes per session: 30 min    Stress: Not at all   Relationships    Social connections:     Talks on phone: More than three times a week     Gets together: More than three times a week     Attends Jain service: Not on file     Active member of club or organization: No     Attends meetings of clubs or organizations: 1 to 4 times per year     Relationship status:    Other Topics Concern    Are you pregnant or think you may be? Not Asked    Breast-feeding Not Asked   Social History Narrative    Not on file       FAMILY HISTORY:  Family History   Problem Relation Age of Onset    Breast cancer Mother          at age 65    Cancer Father         Bladder    Glaucoma Father     Hypertension Father     Heart disease Father     Obesity Sister     Heart disease Brother     Sleep apnea Brother     Diabetes Neg Hx     Melanoma Neg Hx        ALLERGIES AND MEDICATIONS: updated and reviewed.  Review of patient's allergies indicates:   Allergen Reactions    Antihistamines - alkylamine Shortness Of Breath    Atorvastatin      Other reaction(s): myalgia  Other reaction(s): myalgia    Ezetimibe      Other reaction(s): muscle cramps  Other reaction(s): muscle cramps     Medication List with Changes/Refills   Current Medications    ASPIRIN (ECOTRIN) 81 MG EC TABLET    Take 81 mg by mouth once daily.    DOCUSATE SODIUM (COLACE) 50 MG CAPSULE    Take 1 capsule (50 mg total) by mouth 2 (two) times daily.    FAMOTIDINE (PEPCID) 20 MG TABLET    Take 20 mg by mouth 2 (two) times daily.    LIDOCAINE (LIDODERM) 5 %    Place 1 patch onto the skin every 24 hours. Remove & Discard patch within 12 hours or as directed by MD    MULTIVITS-MIN/FA/CA CARB/VIT K (ONE-A-DAY WOMEN'S 50+ ORAL)    Take 1 tablet by mouth once daily.    OMEGA 3-DHA-EPA-FISH OIL 1,000 (120-180) MG CAP    Take 1 capsule by mouth once daily.   Changed and/or Refilled Medications    Modified Medication Previous Medication     FLUTICASONE PROPIONATE (FLONASE) 50 MCG/ACTUATION NASAL SPRAY fluticasone (FLONASE) 50 mcg/actuation nasal spray       SPRAY 1 SPRAY INTO EACH NOSTRIL ONCE EVERY DAY    SPRAY 1 SPRAY INTO EACH NOSTRIL ONCE EVERY DAY         CARE TEAM:  Patient Care Team:  Najma Rangel MD as PCP - General (Internal Medicine)           SCREENING HISTORY:  Health Maintenance       Date Due Completion Date    Shingles Vaccine (1 of 2) 01/08/2004 ---    TETANUS VACCINE 07/15/2018 7/15/2008    DEXA SCAN 08/08/2019 8/8/2016    Mammogram 11/04/2020 11/4/2019    Pneumococcal Vaccine (65+ Low/Medium Risk) (2 of 2 - PPSV23) 12/04/2020 12/4/2019    Colonoscopy 03/26/2024 3/26/2019    Override on 8/3/2010: Done    Lipid Panel 11/26/2024 11/26/2019            REVIEW OF SYSTEMS:   The patient reports: fair dietary habits.  The patient reports : that they do not exercise.  Review of Systems   Constitutional: Negative for activity change, chills, fatigue, fever and unexpected weight change.   HENT: Negative for congestion, hearing loss, postnasal drip, rhinorrhea and trouble swallowing.    Eyes: Negative for pain, discharge and visual disturbance.   Respiratory: Negative for cough, chest tightness, shortness of breath and wheezing.    Cardiovascular: Negative for chest pain, palpitations and leg swelling.   Gastrointestinal: Negative for abdominal pain, blood in stool, constipation, diarrhea, nausea and vomiting.   Endocrine: Negative for polydipsia and polyuria.   Genitourinary: Negative for difficulty urinating, dysuria, hematuria and menstrual problem.   Musculoskeletal: Positive for arthralgias (- chronic knee pain due to arthritis). Negative for back pain, joint swelling and neck pain.   Skin: Negative for rash.   Neurological: Negative for weakness and headaches.   Psychiatric/Behavioral: Negative for confusion, dysphoric mood and sleep disturbance. The patient is not nervous/anxious.       ROS (Optional)-: no pelvic pain  Breast ROS  (Optional)-: negative for breast lumps/discharge            Physical Examination:   Vitals:    12/04/19 0922   BP: 132/84   Pulse: 70   Temp: 97.5 °F (36.4 °C)     Weight: 104.5 kg (230 lb 7.9 oz)   Height: 5' (152.4 cm)   Body mass index is 45.01 kg/m².      Patient did not require to have a chaperone present during the exam today.    General appearance - alert, well appearing, and in no distress, morbidly obese  Psychiatric - alert, oriented to person, place, and time, normal mood, behavior, speech, dress, motor activity, and thought processes  Eyes - pupils equal and reactive, extraocular eye movements intact, sclera anicteric  Ears - bilateral TM's and external ear canals normal  Nose - normal and patent, no erythema, discharge or polyps  Mouth - mucous membranes moist, pharynx normal without lesions and Mallampati score: 4  Neck - supple, no significant adenopathy, carotids upstroke normal bilaterally, no bruits, thyroid exam: thyroid is normal in size without nodules or tenderness  Lymphatics - no palpable cervical lymphadenopathy  Chest - clear to auscultation, no wheezes, rales or rhonchi, symmetric air entry  Heart - normal rate and regular rhythm, no gallops noted  Abdomen - soft, nontender, nondistended, no masses or organomegaly  Breasts - not examined  Back exam - moderate limit in range of motion noted on exam due to body habitus, no significant pain with motion noted during exam, in depth exam deferred  Neurological - alert, normal speech, no focal findings or movement disorder noted, cranial nerves II through XII intact  Musculoskeletal - patient noted to have Moderate osteoarthritic changes to both knee joints. No joint effusions noted., no muscular tenderness noted  Extremities - pedal edema 1+, peripheral pulses decreased  Skin - normal coloration and turgor, no rashes, no suspicious skin lesions noted      Labs:  Results for orders placed or performed in visit on 11/26/19   CBC auto differential    Result Value Ref Range    WBC 5.12 3.90 - 12.70 K/uL    RBC 4.13 4.00 - 5.40 M/uL    Hemoglobin 12.9 12.0 - 16.0 g/dL    Hematocrit 40.6 37.0 - 48.5 %    Mean Corpuscular Volume 98 82 - 98 fL    Mean Corpuscular Hemoglobin 31.2 (H) 27.0 - 31.0 pg    Mean Corpuscular Hemoglobin Conc 31.8 (L) 32.0 - 36.0 g/dL    RDW 13.2 11.5 - 14.5 %    Platelets 250 150 - 350 K/uL    MPV 9.9 9.2 - 12.9 fL    Immature Granulocytes 0.2 0.0 - 0.5 %    Gran # (ANC) 2.9 1.8 - 7.7 K/uL    Immature Grans (Abs) 0.01 0.00 - 0.04 K/uL    Lymph # 1.3 1.0 - 4.8 K/uL    Mono # 0.6 0.3 - 1.0 K/uL    Eos # 0.3 0.0 - 0.5 K/uL    Baso # 0.02 0.00 - 0.20 K/uL    nRBC 0 0 /100 WBC    Gran% 57.0 38.0 - 73.0 %    Lymph% 25.6 18.0 - 48.0 %    Mono% 11.5 4.0 - 15.0 %    Eosinophil% 5.3 0.0 - 8.0 %    Basophil% 0.4 0.0 - 1.9 %    Differential Method Automated    Comprehensive metabolic panel   Result Value Ref Range    Sodium 141 136 - 145 mmol/L    Potassium 4.4 3.5 - 5.1 mmol/L    Chloride 109 95 - 110 mmol/L    CO2 25 23 - 29 mmol/L    Glucose 102 70 - 110 mg/dL    BUN, Bld 17 8 - 23 mg/dL    Creatinine 0.8 0.5 - 1.4 mg/dL    Calcium 8.5 (L) 8.7 - 10.5 mg/dL    Total Protein 7.0 6.0 - 8.4 g/dL    Albumin 3.5 3.5 - 5.2 g/dL    Total Bilirubin 0.8 0.1 - 1.0 mg/dL    Alkaline Phosphatase 76 55 - 135 U/L    AST 15 10 - 40 U/L    ALT 17 10 - 44 U/L    Anion Gap 7 (L) 8 - 16 mmol/L    eGFR if African American >60.0 >60 mL/min/1.73 m^2    eGFR if non African American >60.0 >60 mL/min/1.73 m^2   Lipid panel   Result Value Ref Range    Cholesterol 190 120 - 199 mg/dL    Triglycerides 98 30 - 150 mg/dL    HDL 60 40 - 75 mg/dL    LDL Cholesterol 110.4 63.0 - 159.0 mg/dL    Hdl/Cholesterol Ratio 31.6 20.0 - 50.0 %    Total Cholesterol/HDL Ratio 3.2 2.0 - 5.0    Non-HDL Cholesterol 130 mg/dL          ASSESSMENT AND PLAN:  1. Routine medical exam  Counseled on age appropriate medical preventative services including age appropriate cancer screenings, age appropriate  eye and dental exams, over all nutritional health, need for a consistent exercise regimen, and an over all push towards maintaining a vigorous and active lifestyle.  Counseled on age appropriate vaccines and discussed upcoming health care needs based on age/gender. Discussed good sleep hygiene and stress management.    2. Essential hypertension  Discussed sodium restriction, maintaining ideal body weight and regular exercise program as physiologic means to achieve blood pressure control. The patient will strive towards this. The current medical regimen is effective;  continue present plan and medications. Recommended patient to check home readings to monitor and see me for followup as scheduled or sooner as needed. Patient was educated that both decongestant and anti-inflammatory medication may raise blood pressure.    3. Hyperlipidemia, unspecified hyperlipidemia type/4. Statin myopathy  We discussed low fat diet and regular exercise. Patient is statin intolerant.    5. Primary osteoarthritis of both knees  Stable. Medication as needed. Followed by orthopaedics.    6. Seronegative rheumatoid arthritis  The patient reports that she is no longer seeing Rheumatology.  She states she could not tolerate any of the medication.  She feels she does better on her own with over-the-counter pain medication as needed.    7. BELLA (obstructive sleep apnea)  CPAP compliance: no - unable to tolerate CPAP machine.  We discussed the potential ramifications of untreated sleep apnea, which could include daytime sleepiness, hypertension, heart disease including CHF, sudden death while sleeping and/or stroke. The patient was advised to abstain from driving should they feel sleepy or drowsy.  We discussed potential treatment options, which could include weight loss, body positioning, continuous positive airway pressure (CPAP), or referral for surgical consideration.     8. Severe obesity (BMI 35.0-39.9) with comorbidity  The patient is  asked to make an attempt to improve diet and exercise patterns to aid in medical management of this problem.     9. Flu vaccine need    - Influenza - High Dose (65+) (PF) (IM)    10. Need for Streptococcus pneumoniae vaccination    - (In Office Administered) Pneumococcal Conjugate Vaccine (13 Valent) (IM)    11. Need for shingles vaccine  Not available in the clinic today.    12. Disorder of bone and cartilage    - DXA Bone Density Spine And Hip; Future    13. Advanced directives, counseling/discussion  Advance Care Planning   I initiated the process and explained the importance of advance care planning today with the patient.  Advanced directives were discussed due to patient's age and/or chronic medical conditions. Prognosis based on current condition: good.   Paperwork was given to complete living will (at this point in time, the patient does have full decision-making capacity.  We discussed different extreme health states that she could experience, and reviewed what kind of medical care she would want in those situations) and medical POA (The patient received detailed information about the importance of designating a Health Care Power of . She was also instructed to communicate with this person about their wishes for future healthcare, should she become sick and lose decision-making capacity).   [place] was not discussed.   Approximately 3 minute(s) were spent on counseling/discussion regarding end of life decision making.           14. Allergic rhinitis  Stable. Medication as needed.  - fluticasone propionate (FLONASE) 50 mcg/actuation nasal spray; SPRAY 1 SPRAY INTO EACH NOSTRIL ONCE EVERY DAY  Dispense: 16 g; Refill: 5          Follow up in about 1 year (around 12/4/2020), or if symptoms worsen or fail to improve, for annual exam. or sooner as needed.

## 2019-12-04 NOTE — TELEPHONE ENCOUNTER
----- Message from Najma Rangel MD sent at 12/4/2019 10:52 AM CST -----  Please call patient to schedule BMD.

## 2020-01-13 ENCOUNTER — TELEPHONE (OUTPATIENT)
Dept: FAMILY MEDICINE | Facility: CLINIC | Age: 65
End: 2020-01-13

## 2020-01-13 DIAGNOSIS — M94.9 DISORDER OF BONE AND CARTILAGE: Primary | ICD-10-CM

## 2020-01-13 DIAGNOSIS — M89.9 DISORDER OF BONE AND CARTILAGE: Primary | ICD-10-CM

## 2020-01-13 NOTE — TELEPHONE ENCOUNTER
It has actually been 3 years since last test (8/2016). Patient due. Please schedule at [facility]

## 2020-01-13 NOTE — TELEPHONE ENCOUNTER
----- Message from Mireille Leyva sent at 1/13/2020  3:36 PM CST -----  Contact: patient   Patient presented today to have her bone density testing but the machine is not working. Patient advised to leave a message for the doctor to find out whether she really needed to have it done or not. Please call and reschedule if she is in need because she had one done about 2 years ago and wasn't sure why she needed it done this time.

## 2020-01-14 ENCOUNTER — HOSPITAL ENCOUNTER (OUTPATIENT)
Dept: RADIOLOGY | Facility: HOSPITAL | Age: 65
Discharge: HOME | End: 2020-01-14
Attending: INTERNAL MEDICINE
Payer: COMMERCIAL

## 2020-01-14 DIAGNOSIS — M94.9 DISORDER OF BONE AND CARTILAGE: ICD-10-CM

## 2020-01-14 DIAGNOSIS — M89.9 DISORDER OF BONE AND CARTILAGE: ICD-10-CM

## 2020-01-14 PROCEDURE — 77080 DEXA BONE DENSITY SPINE HIP: ICD-10-PCS | Mod: 26,,, | Performed by: RADIOLOGY

## 2020-01-14 PROCEDURE — 77080 DXA BONE DENSITY AXIAL: CPT | Mod: 26,,, | Performed by: RADIOLOGY

## 2020-01-14 PROCEDURE — 77080 DXA BONE DENSITY AXIAL: CPT | Mod: TC

## 2020-01-14 NOTE — TELEPHONE ENCOUNTER
Spoke with the pt and she had a bad experience with the dexa appt.  She states the employee told her, that she don't know why anybody didn't contact her; machine has been broken.  I scheduled an appt for today with [facility].  Patient verbalized understandings.

## 2020-12-02 ENCOUNTER — TELEPHONE (OUTPATIENT)
Dept: FAMILY MEDICINE | Facility: CLINIC | Age: 65
End: 2020-12-02

## 2020-12-02 ENCOUNTER — PATIENT MESSAGE (OUTPATIENT)
Dept: FAMILY MEDICINE | Facility: CLINIC | Age: 65
End: 2020-12-02

## 2020-12-02 DIAGNOSIS — J30.9 ALLERGIC RHINITIS: ICD-10-CM

## 2020-12-02 DIAGNOSIS — Z12.31 VISIT FOR SCREENING MAMMOGRAM: Primary | ICD-10-CM

## 2020-12-02 DIAGNOSIS — E78.49 OTHER HYPERLIPIDEMIA: ICD-10-CM

## 2020-12-02 DIAGNOSIS — I10 ESSENTIAL HYPERTENSION: ICD-10-CM

## 2020-12-02 RX ORDER — FLUTICASONE PROPIONATE 50 MCG
SPRAY, SUSPENSION (ML) NASAL
Qty: 16 G | Refills: 0 | Status: SHIPPED | OUTPATIENT
Start: 2020-12-02 | End: 2020-12-16 | Stop reason: SDUPTHER

## 2020-12-02 RX ORDER — INFLUENZA A VIRUS A/MICHIGAN/45/2015 X-275 (H1N1) ANTIGEN (FORMALDEHYDE INACTIVATED), INFLUENZA A VIRUS A/SINGAPORE/INFIMH-16-0019/2016 IVR-186 (H3N2) ANTIGEN (FORMALDEHYDE INACTIVATED), INFLUENZA B VIRUS B/PHUKET/3073/2013 ANTIGEN (FORMALDEHYDE INACTIVATED), AND INFLUENZA B VIRUS B/MARYLAND/15/2016 BX-69A ANTIGEN (FORMALDEHYDE INACTIVATED) 60; 60; 60; 60 UG/.7ML; UG/.7ML; UG/.7ML; UG/.7ML
INJECTION, SUSPENSION INTRAMUSCULAR
COMMUNITY
Start: 2020-10-21 | End: 2022-08-01 | Stop reason: CLARIF

## 2020-12-02 NOTE — TELEPHONE ENCOUNTER
Spoke with patient scheduled annual visit with Dr. Rangel 12/16/20 at 11:20 AM with Dr. Rangel. Mammogram scheduled and lab appointment patient verbalized understanding.

## 2020-12-09 ENCOUNTER — HOSPITAL ENCOUNTER (OUTPATIENT)
Dept: RADIOLOGY | Facility: HOSPITAL | Age: 65
Discharge: HOME | End: 2020-12-09
Attending: INTERNAL MEDICINE
Payer: COMMERCIAL

## 2020-12-09 DIAGNOSIS — Z12.31 VISIT FOR SCREENING MAMMOGRAM: ICD-10-CM

## 2020-12-09 PROCEDURE — 77063 BREAST TOMOSYNTHESIS BI: CPT | Mod: 26,,, | Performed by: RADIOLOGY

## 2020-12-09 PROCEDURE — 77067 SCR MAMMO BI INCL CAD: CPT | Mod: 26,,, | Performed by: RADIOLOGY

## 2020-12-09 PROCEDURE — 77067 SCR MAMMO BI INCL CAD: CPT | Mod: TC,PO

## 2020-12-09 PROCEDURE — 77063 MAMMO DIGITAL SCREENING BILAT WITH TOMO: ICD-10-PCS | Mod: 26,,, | Performed by: RADIOLOGY

## 2020-12-09 PROCEDURE — 77067 MAMMO DIGITAL SCREENING BILAT WITH TOMO: ICD-10-PCS | Mod: 26,,, | Performed by: RADIOLOGY

## 2020-12-10 ENCOUNTER — PATIENT MESSAGE (OUTPATIENT)
Dept: FAMILY MEDICINE | Facility: CLINIC | Age: 65
End: 2020-12-10

## 2020-12-10 ENCOUNTER — TELEPHONE (OUTPATIENT)
Dept: FAMILY MEDICINE | Facility: CLINIC | Age: 65
End: 2020-12-10

## 2020-12-10 NOTE — TELEPHONE ENCOUNTER
----- Message from Najma Rangel MD sent at 12/10/2020 11:05 AM CST -----  Please see if the lab can add A1c to lab work drawn yesterday for elevated blood sugar.

## 2020-12-16 ENCOUNTER — OFFICE VISIT (OUTPATIENT)
Dept: FAMILY MEDICINE | Facility: CLINIC | Age: 65
End: 2020-12-16
Payer: COMMERCIAL

## 2020-12-16 VITALS
SYSTOLIC BLOOD PRESSURE: 138 MMHG | HEART RATE: 72 BPM | WEIGHT: 241.94 LBS | OXYGEN SATURATION: 97 % | DIASTOLIC BLOOD PRESSURE: 88 MMHG | BODY MASS INDEX: 47.5 KG/M2 | HEIGHT: 60 IN | TEMPERATURE: 97 F

## 2020-12-16 DIAGNOSIS — Z71.89 ADVANCED DIRECTIVES, COUNSELING/DISCUSSION: ICD-10-CM

## 2020-12-16 DIAGNOSIS — Z23 NEED FOR SHINGLES VACCINE: ICD-10-CM

## 2020-12-16 DIAGNOSIS — M15.9 PRIMARY OSTEOARTHRITIS INVOLVING MULTIPLE JOINTS: ICD-10-CM

## 2020-12-16 DIAGNOSIS — Z00.00 ROUTINE MEDICAL EXAM: Primary | ICD-10-CM

## 2020-12-16 DIAGNOSIS — T46.6X5A STATIN MYOPATHY: ICD-10-CM

## 2020-12-16 DIAGNOSIS — Z23 NEED FOR STREPTOCOCCUS PNEUMONIAE VACCINATION: ICD-10-CM

## 2020-12-16 DIAGNOSIS — I10 ESSENTIAL HYPERTENSION: ICD-10-CM

## 2020-12-16 DIAGNOSIS — J30.9 ALLERGIC RHINITIS: ICD-10-CM

## 2020-12-16 DIAGNOSIS — G47.33 OSA (OBSTRUCTIVE SLEEP APNEA): ICD-10-CM

## 2020-12-16 DIAGNOSIS — G72.0 STATIN MYOPATHY: ICD-10-CM

## 2020-12-16 DIAGNOSIS — E78.49 OTHER HYPERLIPIDEMIA: ICD-10-CM

## 2020-12-16 DIAGNOSIS — E66.01 SEVERE OBESITY (BMI 35.0-39.9) WITH COMORBIDITY: ICD-10-CM

## 2020-12-16 DIAGNOSIS — M06.00 SERONEGATIVE RHEUMATOID ARTHRITIS: ICD-10-CM

## 2020-12-16 PROCEDURE — 90472 IMMUNIZATION ADMIN EACH ADD: CPT | Mod: S$GLB,,, | Performed by: INTERNAL MEDICINE

## 2020-12-16 PROCEDURE — 1126F PR PAIN SEVERITY QUANTIFIED, NO PAIN PRESENT: ICD-10-PCS | Mod: S$GLB,,, | Performed by: INTERNAL MEDICINE

## 2020-12-16 PROCEDURE — 90472 ZOSTER RECOMBINANT VACCINE: ICD-10-PCS | Mod: S$GLB,,, | Performed by: INTERNAL MEDICINE

## 2020-12-16 PROCEDURE — 99397 PR PREVENTIVE VISIT,EST,65 & OVER: ICD-10-PCS | Mod: 25,S$GLB,, | Performed by: INTERNAL MEDICINE

## 2020-12-16 PROCEDURE — 90732 PPSV23 VACC 2 YRS+ SUBQ/IM: CPT | Mod: S$GLB,,, | Performed by: INTERNAL MEDICINE

## 2020-12-16 PROCEDURE — 3288F FALL RISK ASSESSMENT DOCD: CPT | Mod: CPTII,S$GLB,, | Performed by: INTERNAL MEDICINE

## 2020-12-16 PROCEDURE — 99999 PR PBB SHADOW E&M-EST. PATIENT-LVL IV: CPT | Mod: PBBFAC,,, | Performed by: INTERNAL MEDICINE

## 2020-12-16 PROCEDURE — 3008F PR BODY MASS INDEX (BMI) DOCUMENTED: ICD-10-PCS | Mod: CPTII,S$GLB,, | Performed by: INTERNAL MEDICINE

## 2020-12-16 PROCEDURE — 3288F PR FALLS RISK ASSESSMENT DOCUMENTED: ICD-10-PCS | Mod: CPTII,S$GLB,, | Performed by: INTERNAL MEDICINE

## 2020-12-16 PROCEDURE — 90471 IMMUNIZATION ADMIN: CPT | Mod: S$GLB,,, | Performed by: INTERNAL MEDICINE

## 2020-12-16 PROCEDURE — 3075F PR MOST RECENT SYSTOLIC BLOOD PRESS GE 130-139MM HG: ICD-10-PCS | Mod: CPTII,S$GLB,, | Performed by: INTERNAL MEDICINE

## 2020-12-16 PROCEDURE — 3079F DIAST BP 80-89 MM HG: CPT | Mod: CPTII,S$GLB,, | Performed by: INTERNAL MEDICINE

## 2020-12-16 PROCEDURE — 99999 PR PBB SHADOW E&M-EST. PATIENT-LVL IV: ICD-10-PCS | Mod: PBBFAC,,, | Performed by: INTERNAL MEDICINE

## 2020-12-16 PROCEDURE — 90732 PNEUMOCOCCAL POLYSACCHARIDE VACCINE 23-VALENT =>2YO SQ IM: ICD-10-PCS | Mod: S$GLB,,, | Performed by: INTERNAL MEDICINE

## 2020-12-16 PROCEDURE — 90471 PNEUMOCOCCAL POLYSACCHARIDE VACCINE 23-VALENT =>2YO SQ IM: ICD-10-PCS | Mod: S$GLB,,, | Performed by: INTERNAL MEDICINE

## 2020-12-16 PROCEDURE — 1101F PT FALLS ASSESS-DOCD LE1/YR: CPT | Mod: CPTII,S$GLB,, | Performed by: INTERNAL MEDICINE

## 2020-12-16 PROCEDURE — 3075F SYST BP GE 130 - 139MM HG: CPT | Mod: CPTII,S$GLB,, | Performed by: INTERNAL MEDICINE

## 2020-12-16 PROCEDURE — 99397 PER PM REEVAL EST PAT 65+ YR: CPT | Mod: 25,S$GLB,, | Performed by: INTERNAL MEDICINE

## 2020-12-16 PROCEDURE — 3079F PR MOST RECENT DIASTOLIC BLOOD PRESSURE 80-89 MM HG: ICD-10-PCS | Mod: CPTII,S$GLB,, | Performed by: INTERNAL MEDICINE

## 2020-12-16 PROCEDURE — 90750 HZV VACC RECOMBINANT IM: CPT | Mod: S$GLB,,, | Performed by: INTERNAL MEDICINE

## 2020-12-16 PROCEDURE — 90750 ZOSTER RECOMBINANT VACCINE: ICD-10-PCS | Mod: S$GLB,,, | Performed by: INTERNAL MEDICINE

## 2020-12-16 PROCEDURE — 1101F PR PT FALLS ASSESS DOC 0-1 FALLS W/OUT INJ PAST YR: ICD-10-PCS | Mod: CPTII,S$GLB,, | Performed by: INTERNAL MEDICINE

## 2020-12-16 PROCEDURE — 3008F BODY MASS INDEX DOCD: CPT | Mod: CPTII,S$GLB,, | Performed by: INTERNAL MEDICINE

## 2020-12-16 PROCEDURE — 1126F AMNT PAIN NOTED NONE PRSNT: CPT | Mod: S$GLB,,, | Performed by: INTERNAL MEDICINE

## 2020-12-16 RX ORDER — FLUTICASONE PROPIONATE 50 MCG
SPRAY, SUSPENSION (ML) NASAL
Qty: 48 G | Refills: 3 | Status: SHIPPED | OUTPATIENT
Start: 2020-12-16 | End: 2021-12-16 | Stop reason: SDUPTHER

## 2020-12-16 NOTE — PROGRESS NOTES
HISTORY OF PRESENT ILLNESS:  Apoorva Fernandez is a 65 y.o. female who presents to the clinic today for a routine physical exam. Her last physical exam was approximately 1 years(s) ago.        PAST MEDICAL HISTORY:  Past Medical History:   Diagnosis Date    Allergic rhinitis     Allergy     Arthritis     Hyperlipidemia     Diet-controlled    Hypertension     Joint pain     Keloid cicatrix     Obesity     Osteoarthritis     Personal history of colonic polyps 2010    Adenomatous    Personal history of peptic ulcer disease     Umbilical hernia        PAST SURGICAL HISTORY:  Past Surgical History:   Procedure Laterality Date    Abdominal/umbilical hernia repair      removed small part of small bowel; mesh placed     SECTION, LOW TRANSVERSE      X2    CHOLECYSTECTOMY      COLONOSCOPY N/A 3/26/2019    Procedure: COLONOSCOPY;  Surgeon: Chandrika Ho MD;  Location: [facility];  Service: Endoscopy;  Laterality: N/A;    Open laparoscopy for a ruptured ulcer         SOCIAL HISTORY:  Social History     Socioeconomic History    Marital status:      Spouse name: Not on file    Number of children: 2    Years of education: Not on file    Highest education level: Not on file   Occupational History    Occupation:    Social Needs    Financial resource strain: Not hard at all    Food insecurity     Worry: Never true     Inability: Never true    Transportation needs     Medical: No     Non-medical: No   Tobacco Use    Smoking status: Never Smoker    Smokeless tobacco: Never Used   Substance and Sexual Activity    Alcohol use: Yes     Alcohol/week: 2.0 - 3.0 standard drinks     Types: 2 - 3 Glasses of wine per week     Frequency: 2-3 times a week     Drinks per session: 3 or 4     Binge frequency: Never     Comment: Socially on the weekends    Drug use: No    Sexual activity: Yes     Partners: Male   Lifestyle    Physical activity     Days per week: 2 days      Minutes per session: 30 min    Stress: Not at all   Relationships    Social connections     Talks on phone: More than three times a week     Gets together: More than three times a week     Attends Alevism service: Not on file     Active member of club or organization: No     Attends meetings of clubs or organizations: 1 to 4 times per year     Relationship status:    Other Topics Concern    Are you pregnant or think you may be? Not Asked    Breast-feeding Not Asked   Social History Narrative    Not on file       FAMILY HISTORY:  Family History   Problem Relation Age of Onset    Breast cancer Mother          at age 65    Cancer Father         Bladder    Glaucoma Father     Hypertension Father     Heart disease Father     Obesity Sister     Heart disease Brother     Sleep apnea Brother     Diabetes Neg Hx     Melanoma Neg Hx        ALLERGIES AND MEDICATIONS: updated and reviewed.  Review of patient's allergies indicates:   Allergen Reactions    Antihistamines - alkylamine Shortness Of Breath    Atorvastatin      Other reaction(s): myalgia  Other reaction(s): myalgia    Ezetimibe      Other reaction(s): muscle cramps  Other reaction(s): muscle cramps     Medication List with Changes/Refills   Current Medications    ASPIRIN (ECOTRIN) 81 MG EC TABLET    Take 81 mg by mouth once daily.    DOCUSATE SODIUM (COLACE) 50 MG CAPSULE    Take 1 capsule (50 mg total) by mouth 2 (two) times daily.    FAMOTIDINE (PEPCID) 20 MG TABLET    Take 20 mg by mouth 2 (two) times daily.    FLUZONE HIGHDOSE QUAD 20-21  MCG/0.7 ML SYRG    PHARMACY ADMINISTERED    LIDOCAINE (LIDODERM) 5 %    Place 1 patch onto the skin every 24 hours. Remove & Discard patch within 12 hours or as directed by MD    MULTIVITS-MIN/FA/CA CARB/VIT K (ONE-A-DAY WOMEN'S 50+ ORAL)    Take 1 tablet by mouth once daily.    OMEGA 3-DHA-EPA-FISH OIL 1,000 (120-180) MG CAP    Take 1 capsule by mouth once daily.   Changed and/or  Refilled Medications    Modified Medication Previous Medication    FLUTICASONE PROPIONATE (FLONASE) 50 MCG/ACTUATION NASAL SPRAY fluticasone propionate (FLONASE) 50 mcg/actuation nasal spray       SPRAY 2 SPRAYs INTO EACH NOSTRIL ONCE EVERY DAY    SPRAY 1 SPRAY INTO EACH NOSTRIL ONCE EVERY DAY          CARE TEAM:  Patient Care Team:  Najma Rangel MD as PCP - General (Internal Medicine)           SCREENING HISTORY:  Health Maintenance       Date Due Completion Date    Shingles Vaccine (1 of 2) 01/08/2004 ---    TETANUS VACCINE 07/15/2018 7/15/2008    Pneumococcal Vaccine (65+ Low/Medium Risk) (2 of 2 - PPSV23) 12/04/2020 12/4/2019    Mammogram 12/09/2021 12/9/2020    Colorectal Cancer Screening 03/26/2024 3/26/2019    Override on 8/3/2010: Done    Lipid Panel 12/09/2025 12/9/2020    DEXA SCAN 01/14/2030 1/14/2020            REVIEW OF SYSTEMS:   The patient reports: poor dietary habits - : she admits to overeating comfort foods/snacking.  The patient reports : that they do not exercise.  Review of Systems   Constitutional: Negative for chills, fatigue, fever and unexpected weight change.   HENT: Negative for congestion and postnasal drip.    Eyes: Negative for pain and visual disturbance.   Respiratory: Negative for cough, shortness of breath and wheezing.    Cardiovascular: Negative for chest pain, palpitations and leg swelling.   Gastrointestinal: Negative for abdominal pain, constipation, diarrhea, nausea and vomiting.   Genitourinary: Negative for dysuria.   Musculoskeletal: Negative for arthralgias and back pain.   Skin: Negative for rash.   Neurological: Negative for weakness and headaches.   Psychiatric/Behavioral: Negative for dysphoric mood and sleep disturbance. The patient is not nervous/anxious.       ROS (Optional)-: no pelvic pain  Breast ROS (Optional)-: negative for breast lumps/discharge            Physical Examination:   Vitals:    12/16/20 1117   BP: 138/88   Pulse: 72   Temp: 97.3 °F (36.3  °C)     Weight: 109.8 kg (241 lb 15.3 oz)   Height: 5' (152.4 cm)   Body mass index is 47.25 kg/m².      Patient did not require to have a chaperone present during the exam today.    General appearance - alert, well appearing, and in no distress, morbidly obese  Psychiatric - alert, oriented to person, place, and time, normal mood, behavior, speech, dress, motor activity, and thought processes  Eyes - pupils equal and reactive, extraocular eye movements intact, sclera anicteric  Mouth - not examined; patient wearing mask due to Covid 19 pandemic  Neck - supple, no significant adenopathy, carotids upstroke normal bilaterally, no bruits, thyroid exam: thyroid is normal in size without nodules or tenderness  Lymphatics - no palpable cervical lymphadenopathy  Chest - clear to auscultation, no wheezes, rales or rhonchi, symmetric air entry  Heart - normal rate and regular rhythm, no gallops noted  Abdomen - soft, nontender, nondistended, no masses or organomegaly  Back exam - mild limit in range of motion noted on exam due to body habitus, in depth exam deferred  Neurological - alert, normal speech, no focal findings or movement disorder noted, cranial nerves II through XII intact  Musculoskeletal - patient noted to have Moderate osteoarthritic changes to both knee joints. No joint effusions noted., no muscular tenderness noted  Extremities - lymphedema noted - moderate  Skin - normal coloration and turgor, no rashes, no suspicious skin lesions noted      Labs:  Results for orders placed or performed in visit on 12/09/20   Lipid Panel   Result Value Ref Range    Cholesterol 185 120 - 199 mg/dL    Triglycerides 116 30 - 150 mg/dL    HDL 55 40 - 75 mg/dL    LDL Cholesterol 106.8 63.0 - 159.0 mg/dL    HDL/Cholesterol Ratio 29.7 20.0 - 50.0 %    Total Cholesterol/HDL Ratio 3.4 2.0 - 5.0    Non-HDL Cholesterol 130 mg/dL   Comprehensive Metabolic Panel   Result Value Ref Range    Sodium 140 136 - 145 mmol/L    Potassium 4.1  3.5 - 5.1 mmol/L    Chloride 105 95 - 110 mmol/L    CO2 27 23 - 29 mmol/L    Glucose 110 70 - 110 mg/dL    BUN 15 8 - 23 mg/dL    Creatinine 0.8 0.5 - 1.4 mg/dL    Calcium 8.7 8.7 - 10.5 mg/dL    Total Protein 7.4 6.0 - 8.4 g/dL    Albumin 3.7 3.5 - 5.2 g/dL    Total Bilirubin 1.0 0.1 - 1.0 mg/dL    Alkaline Phosphatase 84 55 - 135 U/L    AST 17 10 - 40 U/L    ALT 22 10 - 44 U/L    Anion Gap 8 8 - 16 mmol/L    eGFR if African American >60.0 >60 mL/min/1.73 m^2    eGFR if non African American >60.0 >60 mL/min/1.73 m^2   CBC Auto Differential   Result Value Ref Range    WBC 5.54 3.90 - 12.70 K/uL    RBC 4.27 4.00 - 5.40 M/uL    Hemoglobin 13.1 12.0 - 16.0 g/dL    Hematocrit 41.7 37.0 - 48.5 %    MCV 98 82 - 98 fL    MCH 30.7 27.0 - 31.0 pg    MCHC 31.4 (L) 32.0 - 36.0 g/dL    RDW 13.8 11.5 - 14.5 %    Platelets 256 150 - 350 K/uL    MPV 9.9 9.2 - 12.9 fL    Immature Granulocytes 0.2 0.0 - 0.5 %    Gran # (ANC) 3.5 1.8 - 7.7 K/uL    Immature Grans (Abs) 0.01 0.00 - 0.04 K/uL    Lymph # 1.3 1.0 - 4.8 K/uL    Mono # 0.6 0.3 - 1.0 K/uL    Eos # 0.1 0.0 - 0.5 K/uL    Baso # 0.04 0.00 - 0.20 K/uL    nRBC 0 0 /100 WBC    Gran % 63.0 38.0 - 73.0 %    Lymph % 22.6 18.0 - 48.0 %    Mono % 11.2 4.0 - 15.0 %    Eosinophil % 2.3 0.0 - 8.0 %    Basophil % 0.7 0.0 - 1.9 %    Differential Method Automated    Hemoglobin A1C   Result Value Ref Range    Hemoglobin A1C 5.2 4.0 - 5.6 %    Estimated Avg Glucose 103 68 - 131 mg/dL          ASSESSMENT AND PLAN:  1. Routine medical exam  Counseled on age appropriate medical preventative services including age appropriate cancer screenings, age appropriate eye and dental exams, over all nutritional health, need for a consistent exercise regimen, and an over all push towards maintaining a vigorous and active lifestyle.  Counseled on age appropriate vaccines and discussed upcoming health care needs based on age/gender. Discussed good sleep hygiene and stress management.    2. Essential  hypertension  Discussed sodium restriction, maintaining ideal body weight and regular exercise program as physiologic means to achieve blood pressure control. The patient will strive towards this.   The current medical regimen is effective;  continue present plan and medications. Recommended patient to check home readings to monitor and see me for followup as scheduled or sooner as needed.   Patient was educated that both decongestant and anti-inflammatory medication may raise blood pressure.  The patient is not active on the digital hypertension program.    3. Other hyperlipidemia/4. Statin myopathy  We discussed low fat diet and regular exercise. Patient is statin intolerant.    5. Seronegative rheumatoid arthritis  The current medical regimen is effective;  continue present plan and medications.   She is not currently followed by rheumatology.     6. Primary osteoarthritis involving multiple joints  Stable. Medication as needed.    7. BELLA (obstructive sleep apnea)  CPAP compliance: yes. The patient reports that they continue to benefit from regular use of their CPAP machine..  We discussed the potential ramifications of untreated sleep apnea, which could include daytime sleepiness, hypertension, heart disease including CHF, sudden death while sleeping and/or stroke. The patient was advised to abstain from driving should they feel sleepy or drowsy.  We discussed potential treatment options, which could include weight loss, body positioning, continuous positive airway pressure (CPAP), or referral for surgical consideration.     8. Allergic rhinitis  The current medical regimen is effective;  continue present plan and medications.   - fluticasone propionate (FLONASE) 50 mcg/actuation nasal spray; SPRAY 2 SPRAYs INTO EACH NOSTRIL ONCE EVERY DAY  Dispense: 48 g; Refill: 3    9. Severe obesity (BMI 35.0-39.9) with comorbidity  The patient is asked to make an attempt to improve diet and exercise patterns to aid in  medical management of this problem.    10. Need for Streptococcus pneumoniae vaccination    - (In Office Administered) Pneumococcal Polysaccharide Vaccine (23 Valent) (SQ/IM)    11. Need for shingles vaccine    - (In Office Administered) Zoster Recombinant Vaccine    12. Advanced directives, counseling/discussion  Advance Care Planning   I initiated the process and explained the importance of advance care planning today with the patient and spouse.  Advanced directives were discussed due to patient's age and/or chronic medical conditions. Prognosis based on current condition: good.   Paperwork was previously given to complete living will (at this point in time, the patient does have full decision-making capacity.  We discussed different extreme health states that she could experience, and reviewed what kind of medical care she would want in those situations) and medical POA (The patient received detailed information about the importance of designating a Health Care Power of . She was also instructed to communicate with this person about their wishes for future healthcare, should she become sick and lose decision-making capacity).   [place] was not discussed.   Approximately 3 minute(s) were spent on counseling/discussion regarding end of life decision making.                 Follow up in about 1 year (around 12/16/2021), or if symptoms worsen or fail to improve, for annual exam. or sooner as needed.

## 2020-12-21 ENCOUNTER — PATIENT MESSAGE (OUTPATIENT)
Dept: FAMILY MEDICINE | Facility: CLINIC | Age: 65
End: 2020-12-21

## 2021-02-19 ENCOUNTER — CLINICAL SUPPORT (OUTPATIENT)
Dept: FAMILY MEDICINE | Facility: CLINIC | Age: 66
End: 2021-02-19
Payer: COMMERCIAL

## 2021-02-19 DIAGNOSIS — Z23 NEED FOR SHINGLES VACCINE: Primary | ICD-10-CM

## 2021-02-19 PROCEDURE — 90750 HZV VACC RECOMBINANT IM: CPT | Mod: S$GLB,,, | Performed by: INTERNAL MEDICINE

## 2021-02-19 PROCEDURE — 99499 UNLISTED E&M SERVICE: CPT | Mod: S$GLB,,, | Performed by: INTERNAL MEDICINE

## 2021-02-19 PROCEDURE — 90471 IMMUNIZATION ADMIN: CPT | Mod: S$GLB,,, | Performed by: INTERNAL MEDICINE

## 2021-02-19 PROCEDURE — 90471 ZOSTER RECOMBINANT VACCINE: ICD-10-PCS | Mod: S$GLB,,, | Performed by: INTERNAL MEDICINE

## 2021-02-19 PROCEDURE — 99499 NO LOS: ICD-10-PCS | Mod: S$GLB,,, | Performed by: INTERNAL MEDICINE

## 2021-02-19 PROCEDURE — 90750 ZOSTER RECOMBINANT VACCINE: ICD-10-PCS | Mod: S$GLB,,, | Performed by: INTERNAL MEDICINE

## 2021-03-06 ENCOUNTER — IMMUNIZATION (OUTPATIENT)
Dept: PRIMARY CARE CLINIC | Facility: CLINIC | Age: 66
End: 2021-03-06
Payer: COMMERCIAL

## 2021-03-06 DIAGNOSIS — Z23 NEED FOR VACCINATION: Primary | ICD-10-CM

## 2021-03-06 PROCEDURE — 0001A PR IMMUNIZ ADMIN, SARS-COV-2 COVID-19 VACC, 30MCG/0.3ML, 1ST DOSE: CPT | Mod: CV19,S$GLB,, | Performed by: INTERNAL MEDICINE

## 2021-03-06 PROCEDURE — 0001A PR IMMUNIZ ADMIN, SARS-COV-2 COVID-19 VACC, 30MCG/0.3ML, 1ST DOSE: ICD-10-PCS | Mod: CV19,S$GLB,, | Performed by: INTERNAL MEDICINE

## 2021-03-06 PROCEDURE — 91300 PR SARS-COV- 2 COVID-19 VACCINE, NO PRSV, 30MCG/0.3ML, IM: CPT | Mod: S$GLB,,, | Performed by: INTERNAL MEDICINE

## 2021-03-06 PROCEDURE — 91300 PR SARS-COV- 2 COVID-19 VACCINE, NO PRSV, 30MCG/0.3ML, IM: ICD-10-PCS | Mod: S$GLB,,, | Performed by: INTERNAL MEDICINE

## 2021-03-06 RX ADMIN — Medication 0.3 ML: at 12:03

## 2021-03-27 ENCOUNTER — IMMUNIZATION (OUTPATIENT)
Dept: PRIMARY CARE CLINIC | Facility: CLINIC | Age: 66
End: 2021-03-27
Payer: COMMERCIAL

## 2021-03-27 DIAGNOSIS — Z23 NEED FOR VACCINATION: Primary | ICD-10-CM

## 2021-03-27 PROCEDURE — 91300 PR SARS-COV- 2 COVID-19 VACCINE, NO PRSV, 30MCG/0.3ML, IM: ICD-10-PCS | Mod: S$GLB,,, | Performed by: INTERNAL MEDICINE

## 2021-03-27 PROCEDURE — 0002A PR IMMUNIZ ADMIN, SARS-COV-2 COVID-19 VACC, 30MCG/0.3ML, 2ND DOSE: CPT | Mod: CV19,S$GLB,, | Performed by: INTERNAL MEDICINE

## 2021-03-27 PROCEDURE — 0002A PR IMMUNIZ ADMIN, SARS-COV-2 COVID-19 VACC, 30MCG/0.3ML, 2ND DOSE: ICD-10-PCS | Mod: CV19,S$GLB,, | Performed by: INTERNAL MEDICINE

## 2021-03-27 PROCEDURE — 91300 PR SARS-COV- 2 COVID-19 VACCINE, NO PRSV, 30MCG/0.3ML, IM: CPT | Mod: S$GLB,,, | Performed by: INTERNAL MEDICINE

## 2021-03-27 RX ADMIN — Medication 0.3 ML: at 12:03

## 2021-11-03 ENCOUNTER — IMMUNIZATION (OUTPATIENT)
Dept: OBSTETRICS AND GYNECOLOGY | Facility: CLINIC | Age: 66
End: 2021-11-03
Payer: COMMERCIAL

## 2021-11-03 DIAGNOSIS — Z23 NEED FOR VACCINATION: Primary | ICD-10-CM

## 2021-11-03 PROCEDURE — 91300 COVID-19, MRNA, LNP-S, PF, 30 MCG/0.3 ML DOSE VACCINE: CPT | Mod: PBBFAC | Performed by: FAMILY MEDICINE

## 2021-11-11 ENCOUNTER — PATIENT MESSAGE (OUTPATIENT)
Dept: FAMILY MEDICINE | Facility: CLINIC | Age: 66
End: 2021-11-11
Payer: COMMERCIAL

## 2021-11-11 DIAGNOSIS — M06.00 SERONEGATIVE RHEUMATOID ARTHRITIS: ICD-10-CM

## 2021-11-11 DIAGNOSIS — E78.49 OTHER HYPERLIPIDEMIA: Primary | ICD-10-CM

## 2021-11-11 DIAGNOSIS — Z12.31 VISIT FOR SCREENING MAMMOGRAM: Primary | ICD-10-CM

## 2021-11-11 DIAGNOSIS — I10 ESSENTIAL HYPERTENSION: Primary | ICD-10-CM

## 2021-12-10 ENCOUNTER — HOSPITAL ENCOUNTER (OUTPATIENT)
Dept: RADIOLOGY | Facility: HOSPITAL | Age: 66
Discharge: HOME | End: 2021-12-10
Attending: INTERNAL MEDICINE
Payer: COMMERCIAL

## 2021-12-10 DIAGNOSIS — Z12.31 VISIT FOR SCREENING MAMMOGRAM: ICD-10-CM

## 2021-12-10 PROCEDURE — 77067 SCR MAMMO BI INCL CAD: CPT | Mod: 26,,, | Performed by: RADIOLOGY

## 2021-12-10 PROCEDURE — 77067 MAMMO DIGITAL SCREENING BILAT WITH TOMO: ICD-10-PCS | Mod: 26,,, | Performed by: RADIOLOGY

## 2021-12-10 PROCEDURE — 77063 MAMMO DIGITAL SCREENING BILAT WITH TOMO: ICD-10-PCS | Mod: 26,,, | Performed by: RADIOLOGY

## 2021-12-10 PROCEDURE — 77067 SCR MAMMO BI INCL CAD: CPT | Mod: TC,PO

## 2021-12-10 PROCEDURE — 77063 BREAST TOMOSYNTHESIS BI: CPT | Mod: 26,,, | Performed by: RADIOLOGY

## 2021-12-16 ENCOUNTER — OFFICE VISIT (OUTPATIENT)
Dept: FAMILY MEDICINE | Facility: CLINIC | Age: 66
End: 2021-12-16
Payer: COMMERCIAL

## 2021-12-16 VITALS
DIASTOLIC BLOOD PRESSURE: 72 MMHG | TEMPERATURE: 98 F | HEIGHT: 60 IN | SYSTOLIC BLOOD PRESSURE: 136 MMHG | WEIGHT: 226.88 LBS | BODY MASS INDEX: 44.54 KG/M2 | OXYGEN SATURATION: 97 % | HEART RATE: 72 BPM

## 2021-12-16 DIAGNOSIS — E66.01 SEVERE OBESITY (BMI 35.0-39.9) WITH COMORBIDITY: ICD-10-CM

## 2021-12-16 DIAGNOSIS — Z00.00 WELL ADULT EXAM: ICD-10-CM

## 2021-12-16 DIAGNOSIS — J30.9 ALLERGIC RHINITIS: ICD-10-CM

## 2021-12-16 DIAGNOSIS — M15.9 PRIMARY OSTEOARTHRITIS INVOLVING MULTIPLE JOINTS: ICD-10-CM

## 2021-12-16 DIAGNOSIS — M17.0 LOCALIZED OSTEOARTHRITIS OF KNEES, BILATERAL: Primary | ICD-10-CM

## 2021-12-16 DIAGNOSIS — Z00.00 ROUTINE MEDICAL EXAM: ICD-10-CM

## 2021-12-16 PROCEDURE — 99999 PR PBB SHADOW E&M-EST. PATIENT-LVL IV: ICD-10-PCS | Mod: PBBFAC,,, | Performed by: STUDENT IN AN ORGANIZED HEALTH CARE EDUCATION/TRAINING PROGRAM

## 2021-12-16 PROCEDURE — 99397 PR PREVENTIVE VISIT,EST,65 & OVER: ICD-10-PCS | Mod: 25,S$GLB,, | Performed by: STUDENT IN AN ORGANIZED HEALTH CARE EDUCATION/TRAINING PROGRAM

## 2021-12-16 PROCEDURE — 99999 PR PBB SHADOW E&M-EST. PATIENT-LVL IV: CPT | Mod: PBBFAC,,, | Performed by: STUDENT IN AN ORGANIZED HEALTH CARE EDUCATION/TRAINING PROGRAM

## 2021-12-16 PROCEDURE — 99397 PER PM REEVAL EST PAT 65+ YR: CPT | Mod: 25,S$GLB,, | Performed by: STUDENT IN AN ORGANIZED HEALTH CARE EDUCATION/TRAINING PROGRAM

## 2021-12-16 RX ORDER — FLUTICASONE PROPIONATE 50 MCG
SPRAY, SUSPENSION (ML) NASAL
Qty: 48 G | Refills: 3 | Status: SHIPPED | OUTPATIENT
Start: 2021-12-16 | End: 2022-11-09 | Stop reason: SDUPTHER

## 2022-02-09 LAB
CTP QC/QA: YES
SARS-COV-2 AG RESP QL IA.RAPID: POSITIVE

## 2022-02-10 ENCOUNTER — PATIENT MESSAGE (OUTPATIENT)
Dept: FAMILY MEDICINE | Facility: CLINIC | Age: 67
End: 2022-02-10
Payer: COMMERCIAL

## 2022-02-23 DIAGNOSIS — D84.9 IMMUNOSUPPRESSED STATUS: ICD-10-CM

## 2022-03-14 ENCOUNTER — PATIENT MESSAGE (OUTPATIENT)
Dept: FAMILY MEDICINE | Facility: CLINIC | Age: 67
End: 2022-03-14
Payer: COMMERCIAL

## 2022-03-14 DIAGNOSIS — Z87.898 H/O MOTION SICKNESS: ICD-10-CM

## 2022-03-15 ENCOUNTER — PATIENT MESSAGE (OUTPATIENT)
Dept: FAMILY MEDICINE | Facility: CLINIC | Age: 67
End: 2022-03-15
Payer: COMMERCIAL

## 2022-03-15 RX ORDER — SCOLOPAMINE TRANSDERMAL SYSTEM 1 MG/1
1 PATCH, EXTENDED RELEASE TRANSDERMAL
Qty: 4 PATCH | Refills: 0 | Status: SHIPPED | OUTPATIENT
Start: 2022-03-15 | End: 2022-03-24

## 2022-06-27 ENCOUNTER — OFFICE VISIT (OUTPATIENT)
Dept: RHEUMATOLOGY | Facility: CLINIC | Age: 67
End: 2022-06-27
Payer: COMMERCIAL

## 2022-06-27 ENCOUNTER — LAB VISIT (OUTPATIENT)
Dept: LAB | Facility: HOSPITAL | Age: 67
End: 2022-06-27
Attending: INTERNAL MEDICINE
Payer: COMMERCIAL

## 2022-06-27 VITALS
HEART RATE: 66 BPM | HEIGHT: 60 IN | BODY MASS INDEX: 46.96 KG/M2 | DIASTOLIC BLOOD PRESSURE: 73 MMHG | WEIGHT: 239.19 LBS | SYSTOLIC BLOOD PRESSURE: 136 MMHG

## 2022-06-27 DIAGNOSIS — E66.01 SEVERE OBESITY (BMI 35.0-39.9) WITH COMORBIDITY: ICD-10-CM

## 2022-06-27 DIAGNOSIS — R79.82 ELEVATED C-REACTIVE PROTEIN (CRP): ICD-10-CM

## 2022-06-27 DIAGNOSIS — M17.0 PRIMARY OSTEOARTHRITIS OF BOTH KNEES: ICD-10-CM

## 2022-06-27 DIAGNOSIS — R79.82 ELEVATED C-REACTIVE PROTEIN (CRP): Primary | ICD-10-CM

## 2022-06-27 LAB
CRP SERPL-MCNC: 5.4 MG/L (ref 0–8.2)
ERYTHROCYTE [SEDIMENTATION RATE] IN BLOOD BY PHOTOMETRIC METHOD: 34 MM/HR (ref 0–36)

## 2022-06-27 PROCEDURE — 3075F PR MOST RECENT SYSTOLIC BLOOD PRESS GE 130-139MM HG: ICD-10-PCS | Mod: CPTII,S$GLB,, | Performed by: INTERNAL MEDICINE

## 2022-06-27 PROCEDURE — 3008F PR BODY MASS INDEX (BMI) DOCUMENTED: ICD-10-PCS | Mod: CPTII,S$GLB,, | Performed by: INTERNAL MEDICINE

## 2022-06-27 PROCEDURE — 3075F SYST BP GE 130 - 139MM HG: CPT | Mod: CPTII,S$GLB,, | Performed by: INTERNAL MEDICINE

## 2022-06-27 PROCEDURE — 1160F PR REVIEW ALL MEDS BY PRESCRIBER/CLIN PHARMACIST DOCUMENTED: ICD-10-PCS | Mod: CPTII,S$GLB,, | Performed by: INTERNAL MEDICINE

## 2022-06-27 PROCEDURE — 99203 PR OFFICE/OUTPT VISIT, NEW, LEVL III, 30-44 MIN: ICD-10-PCS | Mod: S$GLB,,, | Performed by: INTERNAL MEDICINE

## 2022-06-27 PROCEDURE — 99203 OFFICE O/P NEW LOW 30 MIN: CPT | Mod: S$GLB,,, | Performed by: INTERNAL MEDICINE

## 2022-06-27 PROCEDURE — 99999 PR PBB SHADOW E&M-EST. PATIENT-LVL IV: CPT | Mod: PBBFAC,,, | Performed by: INTERNAL MEDICINE

## 2022-06-27 PROCEDURE — 99999 PR PBB SHADOW E&M-EST. PATIENT-LVL IV: ICD-10-PCS | Mod: PBBFAC,,, | Performed by: INTERNAL MEDICINE

## 2022-06-27 PROCEDURE — 3078F PR MOST RECENT DIASTOLIC BLOOD PRESSURE < 80 MM HG: ICD-10-PCS | Mod: CPTII,S$GLB,, | Performed by: INTERNAL MEDICINE

## 2022-06-27 PROCEDURE — 3008F BODY MASS INDEX DOCD: CPT | Mod: CPTII,S$GLB,, | Performed by: INTERNAL MEDICINE

## 2022-06-27 PROCEDURE — 86140 C-REACTIVE PROTEIN: CPT | Performed by: INTERNAL MEDICINE

## 2022-06-27 PROCEDURE — 3078F DIAST BP <80 MM HG: CPT | Mod: CPTII,S$GLB,, | Performed by: INTERNAL MEDICINE

## 2022-06-27 PROCEDURE — 85652 RBC SED RATE AUTOMATED: CPT | Performed by: INTERNAL MEDICINE

## 2022-06-27 PROCEDURE — 1160F RVW MEDS BY RX/DR IN RCRD: CPT | Mod: CPTII,S$GLB,, | Performed by: INTERNAL MEDICINE

## 2022-06-27 PROCEDURE — 1159F PR MEDICATION LIST DOCUMENTED IN MEDICAL RECORD: ICD-10-PCS | Mod: CPTII,S$GLB,, | Performed by: INTERNAL MEDICINE

## 2022-06-27 PROCEDURE — 36415 COLL VENOUS BLD VENIPUNCTURE: CPT | Performed by: INTERNAL MEDICINE

## 2022-06-27 PROCEDURE — 1125F PR PAIN SEVERITY QUANTIFIED, PAIN PRESENT: ICD-10-PCS | Mod: CPTII,S$GLB,, | Performed by: INTERNAL MEDICINE

## 2022-06-27 PROCEDURE — 1159F MED LIST DOCD IN RCRD: CPT | Mod: CPTII,S$GLB,, | Performed by: INTERNAL MEDICINE

## 2022-06-27 PROCEDURE — 1125F AMNT PAIN NOTED PAIN PRSNT: CPT | Mod: CPTII,S$GLB,, | Performed by: INTERNAL MEDICINE

## 2022-06-27 NOTE — PROGRESS NOTES
Mrs. Apoorva Fernandez   67 y.o.  female     HPI:  68 y/o female with Hx osteoarthritis of the R knee and back. Pt was diagnosed with seronegative RA from a historical provider, started on plaquenil and discontinued after developing a rash. She has had no evidence of inflammatory arthritis since then. Previous pain in R shoulder as well.     Interval History:  Pt fell onto knees while walking into the clinic. She was flustered, but otherwise okay.     Pt following up after elevated CRP at PCP. She just wanted to make sure she didn't have any new arthritis. Same pain in R knee as before as well as some additional foot and ankle pain. Pain is worse at the end of the day. She has swelling in the feet and ankles at the end of everyday. Morning stiffness for about 1 hr. She takes Ibuprofen, Tylenol, and half/half PRN for pain, she prefers ibuprofen. She also uses lidocaine patches, flexeril cream and PT which all help the pain. Voltaren gel does not help.     Pt received most recent covid vaccine in 2021.     Been seeing Orthopedic doctor outside of [facility] and he is recommending knee replacement in August.     Current Meds:  Vitamins  NSAIDs PRN  Pepcid PRN  Tums PRN    ROS:  General: not ill-appearing and in no acute distress  No chest pain or palpitations  No SOB or cough  No skin rashes, malar rash, photosensitivity   No psoriasis   No patchy alopecia   No sleep changes  No oral and nasal ulcers   No dry eyes and dry mouth   No pleurisy or any cardiopulmonary complaints   No dysphagia, diplopia and dysphonia some muscle weakness in hands  No n/v/d/c  acid reflux+   No raynaud's  No digital ulcers   No cytopenias   No renal issues   No blood clots   No fever, chills, weight loss and loss of appetite +night sweats  No pregnancy losses/pre term deliveries /pregnancy complications    No new onset headaches   No recurrent conjunctivitis or uveitis or scleritis or episcleritis   No chronic or bloody diarrhea  "with no u colitis or crohn's /inflammatory bowel disease   No vaginal or urethral  d/c/STDs/no ulcers   No unexplained lymphadenopathy,parotitis   No seizures, strokes, psychosis  No sclerodactyly  No puffy hands    Vitals:    22 1300 22 1352   BP: (!) 160/71 136/73   Pulse: 81 66   Weight: 108.5 kg (239 lb 3.2 oz)    Height: 5' (1.524 m)         Physical Exam:  Physical Exam  Vitals reviewed.   Constitutional:       General: She is not in acute distress.     Appearance: She is obese. She is not ill-appearing.   Cardiovascular:      Rate and Rhythm: Normal rate and regular rhythm.   Pulmonary:      Effort: Pulmonary effort is normal.      Breath sounds: Normal breath sounds.   Musculoskeletal:      Right shoulder: Tenderness present.      Left shoulder: Normal.      Right upper arm: Tenderness present.      Left upper arm: Normal.      Right wrist: Normal.      Left wrist: Normal.      Right hand: Normal.      Left hand: Normal.      Cervical back: Full passive range of motion without pain.      Right knee: Swelling and effusion present. Decreased range of motion.      Left knee: No swelling or effusion.      Right lower le+ Edema present.      Left lower le+ Edema present.      Right ankle: Swelling present.      Left ankle: Swelling present.      Right foot: Swelling present.      Left foot: Swelling present.   Neurological:      Mental Status: She is alert.       Labs:  CRP 14  ESR normal  CBC normal  CMP normal      Imaging:   DEXA 2020  "1. Normal bone mineral density of the lumbar spine.  2. Normal bone mineral density of the right femoral neck"    All recent imaging of joints have been done by orthopedic doctor who is outside of [facility] system    Assessment:  Pt has no clinical evidence to suggest an inflammatory arthritis. Pain with movement, worse at the end of the day, and not associated with synovitis is consistent with progressing osteoarthritis.     Elevated CRP may be attributed to " recent covid vaccination at the time of lab draw.     Plan:  - CRP, ESR today  - continue NSAIDs PRN and PT at home  - continue following up with Ortho for treatment/surgery  - Return to clinic as needed pending normal lab results.       TIM Munguia-S2  [facility]   Physician Assistant Student     I have personally taken the history and examined the patient and concur with the student's note as above.  I saw her initially in 2015. She had evidence of osteoarthritis, especially of the right knee.  I treated her in the past with anti-inflammatory medications and had given her several injections in the knee.  These had helped temporarily.  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  This occurred on 2 occasions.  She was seronegative but had increased inflammatory markers, both sed rate and CRP.  I treated her for a brief period of time with corticosteroids.  I placed her on Plaquenil but she developed a rash.  After that she had no further inflammatory arthritis and her inflammatory markers normalized.    Her knee pain is gotten worse and she is being scheduled for total knee replacement.  In December she saw her primary doctor and had laboratory studies.  She had an elevated CRP but normal ESR.  She had no URI symptoms.  She had gotten her COVID booster 2 weeks prior.  Physical examination at this time reveals no evidence of inflammatory arthritis.  She does have decreased extension of her elbows.  There is some thickening but no swelling.  She has a small effusion in the right knee.  She has decreased flexion of the knee.    I will repeat her inflammatory markers.  I doubt we are dealing with an inflammatory arthritis at this time.  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  She is to return to see me as needed.          Answers for HPI/ROS submitted by the patient on 6/20/2022  fever: No  eye redness: No  mouth sores: No  headaches: No  shortness of breath:  Yes  chest pain: No  trouble swallowing: No  diarrhea: No  constipation: No  unexpected weight change: No  genital sore: No  dysuria: No  During the last 3 days, have you had a skin rash?: No  Bruises or bleeds easily: No  cough: No

## 2022-06-27 NOTE — PROGRESS NOTES
Answers for HPI/ROS submitted by the patient on 6/20/2022  fever: No  eye redness: No  mouth sores: No  headaches: No  shortness of breath: Yes  chest pain: No  trouble swallowing: No  diarrhea: No  constipation: No  unexpected weight change: No  genital sore: No  dysuria: No  During the last 3 days, have you had a skin rash?: No  Bruises or bleeds easily: No  cough: No

## 2022-07-22 ENCOUNTER — ANESTHESIA EVENT (OUTPATIENT)
Dept: SURGERY | Facility: HOSPITAL | Age: 67
End: 2022-07-22
Payer: COMMERCIAL

## 2022-07-28 ENCOUNTER — TELEPHONE (OUTPATIENT)
Dept: FAMILY MEDICINE | Facility: CLINIC | Age: 67
End: 2022-07-28
Payer: COMMERCIAL

## 2022-07-28 NOTE — TELEPHONE ENCOUNTER
----- Message from Stephanie Chaparro sent at 7/28/2022  9:54 AM CDT -----  .Type: Patient Call Back    Who called: self    What is the request in detail: pt said she messaged in portal and was never called. She needs clearance appt for surgery asap. She goes for pre=op Monday.  Please call back today    Can the clinic reply by [facility]?no    Would the patient rather a call back or a response via [facility]? call    Best call back number: .[phone]

## 2022-07-29 ENCOUNTER — OFFICE VISIT (OUTPATIENT)
Dept: INTERNAL MEDICINE | Facility: CLINIC | Age: 67
End: 2022-07-29
Payer: MEDICARE

## 2022-07-29 ENCOUNTER — TELEPHONE (OUTPATIENT)
Dept: INTERNAL MEDICINE | Facility: CLINIC | Age: 67
End: 2022-07-29

## 2022-07-29 VITALS
SYSTOLIC BLOOD PRESSURE: 171 MMHG | WEIGHT: 236.56 LBS | HEART RATE: 72 BPM | OXYGEN SATURATION: 100 % | BODY MASS INDEX: 46.44 KG/M2 | DIASTOLIC BLOOD PRESSURE: 85 MMHG | HEIGHT: 60 IN

## 2022-07-29 DIAGNOSIS — I10 HYPERTENSION, UNSPECIFIED TYPE: ICD-10-CM

## 2022-07-29 DIAGNOSIS — Z01.818 PRE-OP EVALUATION: Primary | ICD-10-CM

## 2022-07-29 DIAGNOSIS — R60.9 EDEMA, UNSPECIFIED TYPE: ICD-10-CM

## 2022-07-29 PROCEDURE — 99999 PR PBB SHADOW E&M-EST. PATIENT-LVL IV: CPT | Mod: PBBFAC,,,

## 2022-07-29 PROCEDURE — 3077F SYST BP >= 140 MM HG: CPT | Mod: CPTII,S$GLB,,

## 2022-07-29 PROCEDURE — 99999 PR PBB SHADOW E&M-EST. PATIENT-LVL IV: ICD-10-PCS | Mod: PBBFAC,,,

## 2022-07-29 PROCEDURE — 3079F DIAST BP 80-89 MM HG: CPT | Mod: CPTII,S$GLB,,

## 2022-07-29 PROCEDURE — 1101F PR PT FALLS ASSESS DOC 0-1 FALLS W/OUT INJ PAST YR: ICD-10-PCS | Mod: CPTII,S$GLB,,

## 2022-07-29 PROCEDURE — 3008F PR BODY MASS INDEX (BMI) DOCUMENTED: ICD-10-PCS | Mod: CPTII,S$GLB,,

## 2022-07-29 PROCEDURE — 1126F AMNT PAIN NOTED NONE PRSNT: CPT | Mod: CPTII,S$GLB,,

## 2022-07-29 PROCEDURE — 3008F BODY MASS INDEX DOCD: CPT | Mod: CPTII,S$GLB,,

## 2022-07-29 PROCEDURE — 3079F PR MOST RECENT DIASTOLIC BLOOD PRESSURE 80-89 MM HG: ICD-10-PCS | Mod: CPTII,S$GLB,,

## 2022-07-29 PROCEDURE — 3044F HG A1C LEVEL LT 7.0%: CPT | Mod: CPTII,S$GLB,,

## 2022-07-29 PROCEDURE — 3077F PR MOST RECENT SYSTOLIC BLOOD PRESSURE >= 140 MM HG: ICD-10-PCS | Mod: CPTII,S$GLB,,

## 2022-07-29 PROCEDURE — 1126F PR PAIN SEVERITY QUANTIFIED, NO PAIN PRESENT: ICD-10-PCS | Mod: CPTII,S$GLB,,

## 2022-07-29 PROCEDURE — 3288F PR FALLS RISK ASSESSMENT DOCUMENTED: ICD-10-PCS | Mod: CPTII,S$GLB,,

## 2022-07-29 PROCEDURE — 3288F FALL RISK ASSESSMENT DOCD: CPT | Mod: CPTII,S$GLB,,

## 2022-07-29 PROCEDURE — 3044F PR MOST RECENT HEMOGLOBIN A1C LEVEL <7.0%: ICD-10-PCS | Mod: CPTII,S$GLB,,

## 2022-07-29 PROCEDURE — 1101F PT FALLS ASSESS-DOCD LE1/YR: CPT | Mod: CPTII,S$GLB,,

## 2022-07-29 PROCEDURE — 99213 PR OFFICE/OUTPT VISIT, EST, LEVL III, 20-29 MIN: ICD-10-PCS | Mod: S$GLB,,,

## 2022-07-29 PROCEDURE — 99213 OFFICE O/P EST LOW 20 MIN: CPT | Mod: S$GLB,,,

## 2022-07-29 RX ORDER — TRIAMTERENE AND HYDROCHLOROTHIAZIDE 37.5; 25 MG/1; MG/1
1 CAPSULE ORAL EVERY MORNING
Qty: 30 CAPSULE | Refills: 11 | Status: SHIPPED | OUTPATIENT
Start: 2022-07-29 | End: 2022-11-09 | Stop reason: SDUPTHER

## 2022-07-29 RX ORDER — SCOLOPAMINE TRANSDERMAL SYSTEM 1 MG/1
PATCH, EXTENDED RELEASE TRANSDERMAL
COMMUNITY
Start: 2022-04-28 | End: 2022-08-01 | Stop reason: CLARIF

## 2022-07-29 NOTE — PROGRESS NOTES
"Clinic Note  2022      Subjective:       Patient ID:  Apoorva is a 67 y.o. female being seen to be cleared for upcoming knee replacement surgery scheduled for 8/15/22.    Chief Complaint: Pre-op Exam    Pt is 68 y/o F who is here for pre-op evaluation. She has no major complaints today, 22, and is scheduled to have a R total knee replacement on 8/15/22. She states her BP is high for her, she reports she is usually "around 135/80's" when she measures at home. She also reports having a previous diagnosis of BELLA but has not used her CPAP machine the past 8-10 years.     Surgery: total R knee replacement  Date: 8/15/22  Surgeon: Dr. Willis    Patient specific questions  Feeling well: yes  Recent fever: no  Previous surgery: open laparoscopy of ruptured ulcer, cholecystectomy,  2x, abdominal umbilical hernia repair 2x  Complications: none  Reactions to anesthesia: nausea  Allergies to medications: allergic to antihistamines, causes SOB  Anticoagulation: on aspirin    Surgery: Risk for Cardiac Events  Intermediate Risk: orthopedic - knee replacement     METs >/= 4 (consider stress testing < 4, cannot answer yes to at least one)  Can you walk 2 blocks at 4 mph on flat surface without stopping?  yes  Can you climb 1-2 flight of stairs without stopping? yes  Can you perform household chores like vacuuming? yes    Review of Systems   Constitutional: Negative.    HENT: Negative.    Eyes: Negative.    Respiratory: Negative.    Cardiovascular: Negative.    Gastrointestinal: Negative.    Musculoskeletal: Positive for joint pain and myalgias.   Skin: Negative.    Neurological: Negative.    Psychiatric/Behavioral: Negative.        Medication List with Changes/Refills   New Medications    TRIAMTERENE-HYDROCHLOROTHIAZIDE 37.5-25 MG (DYAZIDE) 37.5-25 MG PER CAPSULE    Take 1 capsule by mouth every morning.   Current Medications    ASPIRIN (ECOTRIN) 81 MG EC TABLET    Take 81 mg by mouth once daily.    FAMOTIDINE " (PEPCID) 20 MG TABLET    Take 20 mg by mouth 2 (two) times daily.    FLUTICASONE PROPIONATE (FLONASE) 50 MCG/ACTUATION NASAL SPRAY    SPRAY 2 SPRAYs INTO EACH NOSTRIL ONCE EVERY DAY    FLUZONE HIGHDOSE QUAD 20-21  MCG/0.7 ML SYRG    PHARMACY ADMINISTERED    LIDOCAINE (LIDODERM) 5 %    Place 1 patch onto the skin every 24 hours. Remove & Discard patch within 12 hours or as directed by MD    MULTIVITS-MIN/FA/CA CARB/VIT K (ONE-A-DAY WOMEN'S 50+ ORAL)    Take 1 tablet by mouth once daily.    OMEGA 3-DHA-EPA-FISH OIL 1,000 (120-180) MG CAP    Take 1 capsule by mouth once daily.    SCOPOLAMINE (TRANSDERM-SCOP) 1.3-1.5 MG (1 MG OVER 3 DAYS)       Discontinued Medications    DOCUSATE SODIUM (COLACE) 50 MG CAPSULE    Take 1 capsule (50 mg total) by mouth 2 (two) times daily.       Patient Active Problem List   Diagnosis    Other hyperlipidemia    Personal history of peptic ulcer disease    Severe obesity (BMI 35.0-39.9) with comorbidity    Allergic rhinitis    Essential hypertension    Postmenopausal bleeding    Primary osteoarthritis of both knees    BELLA (obstructive sleep apnea)    Seronegative rheumatoid arthritis    Primary osteoarthritis involving multiple joints    Incisional hernia    Statin myopathy           Objective:      BP (!) 171/85 (BP Location: Right arm, Patient Position: Sitting, BP Method: Large (Automatic))   Pulse 72   Ht 5' (1.524 m)   Wt 107.3 kg (236 lb 8.9 oz)   LMP 08/22/2015 (Exact Date)   SpO2 100%   BMI 46.20 kg/m²   Estimated body mass index is 46.2 kg/m² as calculated from the following:    Height as of this encounter: 5' (1.524 m).    Weight as of this encounter: 107.3 kg (236 lb 8.9 oz).  Physical Exam  Vitals and nursing note reviewed.   Constitutional:       General: She is not in acute distress.     Appearance: Normal appearance. She is obese. She is not ill-appearing.   HENT:      Head: Normocephalic and atraumatic.      Mouth/Throat:      Mouth: Mucous membranes  are moist.      Pharynx: Oropharynx is clear.   Eyes:      Extraocular Movements: Extraocular movements intact.      Conjunctiva/sclera: Conjunctivae normal.   Cardiovascular:      Rate and Rhythm: Normal rate and regular rhythm.      Pulses: Normal pulses.      Heart sounds: Normal heart sounds. No murmur heard.    No gallop.   Pulmonary:      Effort: Pulmonary effort is normal. No respiratory distress.      Breath sounds: Normal breath sounds. No wheezing or rales.   Abdominal:      General: Abdomen is flat. Bowel sounds are normal. There is no distension.      Palpations: Abdomen is soft.      Tenderness: There is no abdominal tenderness. There is no guarding.   Musculoskeletal:         General: Swelling present.      Cervical back: Normal range of motion and neck supple.      Right lower leg: Edema present.      Left lower leg: Edema present.   Skin:     General: Skin is warm and dry.   Neurological:      General: No focal deficit present.      Mental Status: She is alert and oriented to person, place, and time.   Psychiatric:         Mood and Affect: Mood normal.         Behavior: Behavior normal.           Assessment and Plan:     Cardiovascular Risk Assessment:  Active cardiac issues:  Active decompensated heart failure? No   Unstable angina?  No   Significant uncontrolled arrhythmias? No   Severe valvular heart disease-Aortic or Mitral Stenosis? No   Recent MI or coronary revascularization < 30 days? No           Anticoagulation:   Aspirin       Revised Cardiac Risk Index   High risk surgery: No  History of ischemic heart disease: No  History of congestive heart failure: No  History of stroke: No  Insulin dependent diabetes: No  Cr > 2: No  0 points, class I risk, 3.9% risk of cardiac event 2014 ACC/AHA Perioperative Guidelines  Known or risk factors for CAD: No  High risk of MACE: No  Functional capacity < 4 METs: No, proceed without further testing     RCRI Calculator Class and Risk percentage:      3.9%,  0 points, Class I Risk          Pt has no active cardiac condition (ACS/USA, decompenstated CHF, significant arrhythmias or severe valvular disease) and can easily achieve 4 METS. As such, pt does not require further cardiac evaluation prior to undergoing surgery. Pt should remain on beta-blockers throughout the entire archana-procedure time period.  Stop aspirin therapy one week before surgery, 8/8/22, but Aspirin should be restarted as soon as safely possible after surgery. The remaining cardiac meds can be held as needed but should also be restarted after the procedure. These recommendations follow the most current Guideline on Perioperative Cardiovascular Evaluation and Management of Patients Undergoing Noncardiac Surgery released by the ACC/AHA (JACC 2014.07.944).    No contraindication for anesthesia and surgery at this time with acceptable risks of cardiac events of 3.9%.         Problem List Items Addressed This Visit    None     Visit Diagnoses     Pre-op evaluation    -  Primary    Relevant Orders    Echo    BNP    CBC W/ AUTO DIFFERENTIAL    COMPREHENSIVE METABOLIC PANEL    EKG 12-lead    Edema, unspecified type      - start triamteren-hydrochlorothiazide 37.5-25mg to help elevated BP and b/l pitting edema in lower extremities     - BNP and TTE ordered for pre-op evaluation to monitor for possible Heart Failure/Fluid Overload    Hypertension, unspecified type   - start triamteren-hydrochlorothiazide 37.5-25mg to help elevated BP and b/l pitting edema in lower extremities           Health Maintenance Due   Topic Date Due    COVID-19 Vaccine (4 - Booster for Pfizer series) 02/03/2022       Follow Up:   Follow up with PCP, Dr. Rangel, in November, 2022, for your annual visit.

## 2022-07-29 NOTE — PATIENT INSTRUCTIONS
Will plan to get an Echo TTE, EKG to evaluate your heart. Will also order a BNP blood test. Ordered Dyazide fluid pill to your local pharmacy. Follow up with [facility] on Monday, 8/1/22, to get blood work, EKG, and echo done.

## 2022-07-29 NOTE — TELEPHONE ENCOUNTER
----- Message from Denise Razo sent at 7/29/2022  4:01 PM CDT -----  Pt's Echo, labs and EKG has been scheduled for the [facility] location.      Thanks

## 2022-08-01 ENCOUNTER — HOSPITAL ENCOUNTER (OUTPATIENT)
Dept: RADIOLOGY | Facility: HOSPITAL | Age: 67
Discharge: HOME | End: 2022-08-01
Attending: ORTHOPAEDIC SURGERY
Payer: COMMERCIAL

## 2022-08-01 ENCOUNTER — HOSPITAL ENCOUNTER (OUTPATIENT)
Dept: CARDIOLOGY | Facility: HOSPITAL | Age: 67
Discharge: HOME | End: 2022-08-01
Attending: ORTHOPAEDIC SURGERY
Payer: COMMERCIAL

## 2022-08-01 ENCOUNTER — HOSPITAL ENCOUNTER (OUTPATIENT)
Dept: PREADMISSION TESTING | Facility: HOSPITAL | Age: 67
Discharge: HOME | End: 2022-08-01
Attending: ORTHOPAEDIC SURGERY
Payer: COMMERCIAL

## 2022-08-01 VITALS
DIASTOLIC BLOOD PRESSURE: 81 MMHG | OXYGEN SATURATION: 96 % | SYSTOLIC BLOOD PRESSURE: 155 MMHG | HEIGHT: 60 IN | RESPIRATION RATE: 18 BRPM | WEIGHT: 236.31 LBS | TEMPERATURE: 97 F | BODY MASS INDEX: 46.39 KG/M2 | HEART RATE: 73 BPM

## 2022-08-01 DIAGNOSIS — Z01.818 PRE-OP EVALUATION: ICD-10-CM

## 2022-08-01 DIAGNOSIS — Z01.818 PREOPERATIVE TESTING: Primary | ICD-10-CM

## 2022-08-01 LAB
ALBUMIN SERPL BCP-MCNC: 3.7 G/DL (ref 3.5–5.2)
ALP SERPL-CCNC: 87 U/L (ref 55–135)
ALT SERPL W/O P-5'-P-CCNC: 25 U/L (ref 10–44)
ANION GAP SERPL CALC-SCNC: 7 MMOL/L (ref 8–16)
AST SERPL-CCNC: 17 U/L (ref 10–40)
BASOPHILS # BLD AUTO: 0.04 K/UL (ref 0–0.2)
BASOPHILS NFR BLD: 0.6 % (ref 0–1.9)
BILIRUB SERPL-MCNC: 0.9 MG/DL (ref 0.1–1)
BILIRUB UR QL STRIP: NEGATIVE
BNP SERPL-MCNC: 22 PG/ML (ref 0–99)
BUN SERPL-MCNC: 20 MG/DL (ref 8–23)
CALCIUM SERPL-MCNC: 9.2 MG/DL (ref 8.7–10.5)
CHLORIDE SERPL-SCNC: 105 MMOL/L (ref 95–110)
CLARITY UR: ABNORMAL
CO2 SERPL-SCNC: 27 MMOL/L (ref 23–29)
COLOR UR: YELLOW
CREAT SERPL-MCNC: 0.9 MG/DL (ref 0.5–1.4)
DIFFERENTIAL METHOD: ABNORMAL
EOSINOPHIL # BLD AUTO: 0.2 K/UL (ref 0–0.5)
EOSINOPHIL NFR BLD: 3.1 % (ref 0–8)
ERYTHROCYTE [DISTWIDTH] IN BLOOD BY AUTOMATED COUNT: 13.4 % (ref 11.5–14.5)
EST. GFR  (NO RACE VARIABLE): >60 ML/MIN/1.73 M^2
ESTIMATED AVG GLUCOSE: 100 MG/DL (ref 68–131)
GLUCOSE SERPL-MCNC: 102 MG/DL (ref 70–110)
GLUCOSE UR QL STRIP: NEGATIVE
HBA1C MFR BLD: 5.1 % (ref 4–5.6)
HCT VFR BLD AUTO: 40.4 % (ref 37–48.5)
HGB BLD-MCNC: 13.6 G/DL (ref 12–16)
HGB UR QL STRIP: NEGATIVE
IMM GRANULOCYTES # BLD AUTO: 0.04 K/UL (ref 0–0.04)
IMM GRANULOCYTES NFR BLD AUTO: 0.6 % (ref 0–0.5)
KETONES UR QL STRIP: NEGATIVE
LEUKOCYTE ESTERASE UR QL STRIP: NEGATIVE
LYMPHOCYTES # BLD AUTO: 1.7 K/UL (ref 1–4.8)
LYMPHOCYTES NFR BLD: 25.4 % (ref 18–48)
MCH RBC QN AUTO: 31.9 PG (ref 27–31)
MCHC RBC AUTO-ENTMCNC: 33.7 G/DL (ref 32–36)
MCV RBC AUTO: 95 FL (ref 82–98)
MONOCYTES # BLD AUTO: 0.7 K/UL (ref 0.3–1)
MONOCYTES NFR BLD: 10 % (ref 4–15)
NEUTROPHILS # BLD AUTO: 3.9 K/UL (ref 1.8–7.7)
NEUTROPHILS NFR BLD: 60.3 % (ref 38–73)
NITRITE UR QL STRIP: NEGATIVE
NRBC BLD-RTO: 0 /100 WBC
PH UR STRIP: 6 [PH] (ref 5–8)
PLATELET # BLD AUTO: 222 K/UL (ref 150–450)
PMV BLD AUTO: 9.3 FL (ref 9.2–12.9)
POTASSIUM SERPL-SCNC: 4.6 MMOL/L (ref 3.5–5.1)
PROT SERPL-MCNC: 7.5 G/DL (ref 6–8.4)
PROT UR QL STRIP: NEGATIVE
RBC # BLD AUTO: 4.27 M/UL (ref 4–5.4)
SODIUM SERPL-SCNC: 139 MMOL/L (ref 136–145)
SP GR UR STRIP: 1.02 (ref 1–1.03)
URN SPEC COLLECT METH UR: ABNORMAL
UROBILINOGEN UR STRIP-ACNC: NEGATIVE EU/DL
WBC # BLD AUTO: 6.5 K/UL (ref 3.9–12.7)

## 2022-08-01 PROCEDURE — 93005 ELECTROCARDIOGRAM TRACING: CPT

## 2022-08-01 PROCEDURE — 93010 EKG 12-LEAD: ICD-10-PCS | Mod: ,,, | Performed by: INTERNAL MEDICINE

## 2022-08-01 PROCEDURE — 81003 URINALYSIS AUTO W/O SCOPE: CPT | Performed by: ORTHOPAEDIC SURGERY

## 2022-08-01 PROCEDURE — 83036 HEMOGLOBIN GLYCOSYLATED A1C: CPT | Performed by: ORTHOPAEDIC SURGERY

## 2022-08-01 PROCEDURE — 83880 ASSAY OF NATRIURETIC PEPTIDE: CPT | Performed by: ORTHOPAEDIC SURGERY

## 2022-08-01 PROCEDURE — 93010 ELECTROCARDIOGRAM REPORT: CPT | Mod: ,,, | Performed by: INTERNAL MEDICINE

## 2022-08-01 PROCEDURE — 71046 XR CHEST PA AND LATERAL PRE-OP: ICD-10-PCS | Mod: 26,,, | Performed by: RADIOLOGY

## 2022-08-01 PROCEDURE — 80053 COMPREHEN METABOLIC PANEL: CPT | Performed by: ORTHOPAEDIC SURGERY

## 2022-08-01 PROCEDURE — 36415 COLL VENOUS BLD VENIPUNCTURE: CPT | Performed by: ORTHOPAEDIC SURGERY

## 2022-08-01 PROCEDURE — 71046 X-RAY EXAM CHEST 2 VIEWS: CPT | Mod: TC,FY

## 2022-08-01 PROCEDURE — 85025 COMPLETE CBC W/AUTO DIFF WBC: CPT | Performed by: ORTHOPAEDIC SURGERY

## 2022-08-01 PROCEDURE — 71046 X-RAY EXAM CHEST 2 VIEWS: CPT | Mod: 26,,, | Performed by: RADIOLOGY

## 2022-08-01 NOTE — DISCHARGE INSTRUCTIONS
Before 7 AM, enter through the Emergency Entrance..   After 7 AM enter through the Main Entrance.      Your procedure  is scheduled for __8/15/2022________.    Call [phone] between 2pm and 5pm on _8/14/2022______to find out your arrival time for the day of surgery.    You may use the main entrance to the hospital on the [facility] side, or the entrance that is next to the [facility].    You may have two visitors.  Visiting hours for non-COVID-19 patients expanded to 24/7 (still restricted to one visitor)  Youth visitation changed from age 18 to age 12.      You will be going to the Same Day Surgery Unit on the 2nd floor of the hospital.    Important instructions:  Do not eat anything after midnight.  You may have plain water, non carbonated.  You may also have Gatorade or Powerade after midnight.    Stop all fluids 2 hours before your surgery.    It is okay to brush your teeth.  Do not have gum, candy or mints.    SEE MEDICATION SHEET.   TAKE MEDICATIONS AS DIRECTED WITH SIPS OF WATER.      STOP taking Aspirin, Ibuprofen,  Advil, Motrin, Mobic(meloxicam), Aleve (naproxen), Fish oil, and Vitamin E for at least 7 days before your surgery.     You may take Tylenol if needed which is not a blood thinner.    Please shower the night before and the morning of your surgery.      Use Hibiclens soap as instructed by your pre op nurse.   Please place clean linens on your bed the night before surgery. Please wear fresh clean clothing after each shower.    No shaving of procedural area at least 4-5 days before surgery due to increased risk of skin irritation and/or possible infection.    Contact lenses and removable denture work may not be worn during your procedure.    You may wear deodorant only. If you are having breast surgery, do not wear deodorant on the operative side.    Do not wear powder, body lotion, perfume/cologne or make-up.    Do not wear any jewelry or have any metal on your body.    You will be asked  to remove any dentures or partials for the procedure.    If you are going home on the same day of surgery, you must arrange for a family member or a friend to drive you home.  Public transportation is prohibited.  You will not be able to drive home if you were given anesthesia or sedation.    Patients who want to have their Post-op prescriptions filled from our in-house [facility], bring a Credit/Debit Card  or cash with you. A co-pay may be required.  The pharmacy closes at 5:30 pm.    Wear loose fitting clothes allowing for bandages.    Please leave money and valuables home.      You may bring your cell phone.    Call the doctor if fever or illness should occur before your surgery.    Call [phone] to contact us here if needed.

## 2022-08-03 ENCOUNTER — HOSPITAL ENCOUNTER (OUTPATIENT)
Dept: CARDIOLOGY | Facility: HOSPITAL | Age: 67
Discharge: HOME | End: 2022-08-03
Payer: COMMERCIAL

## 2022-08-03 DIAGNOSIS — Z01.818 PRE-OP EVALUATION: ICD-10-CM

## 2022-08-03 LAB
AV INDEX (PROSTH): 0.81
AV MEAN GRADIENT: 5 MMHG
AV PEAK GRADIENT: 10 MMHG
AV VALVE AREA: 2.8 CM2
AV VELOCITY RATIO: 0.75
CV ECHO LV RWT: 0.35 CM
DOP CALC AO PEAK VEL: 1.55 M/S
DOP CALC AO VTI: 25.58 CM
DOP CALC LVOT AREA: 3.5 CM2
DOP CALC LVOT DIAMETER: 2.1 CM
DOP CALC LVOT PEAK VEL: 1.16 M/S
DOP CALC LVOT STROKE VOLUME: 71.63 CM3
DOP CALCLVOT PEAK VEL VTI: 20.69 CM
E WAVE DECELERATION TIME: 286.63 MSEC
E/A RATIO: 1.07
E/E' RATIO: 9.69 M/S
ECHO LV POSTERIOR WALL: 0.91 CM (ref 0.6–1.1)
EJECTION FRACTION: 55 %
FRACTIONAL SHORTENING: 29 % (ref 28–44)
INTERVENTRICULAR SEPTUM: 1.38 CM (ref 0.6–1.1)
IVRT: 133.21 MSEC
LA MAJOR: 5.94 CM
LA MINOR: 5.33 CM
LA WIDTH: 4.09 CM
LEFT ATRIUM SIZE: 5.18 CM
LEFT ATRIUM VOLUME: 101.18 CM3
LEFT INTERNAL DIMENSION IN SYSTOLE: 3.66 CM (ref 2.1–4)
LEFT VENTRICLE DIASTOLIC VOLUME: 125.94 ML
LEFT VENTRICLE SYSTOLIC VOLUME: 56.51 ML
LEFT VENTRICULAR INTERNAL DIMENSION IN DIASTOLE: 5.14 CM (ref 3.5–6)
LEFT VENTRICULAR MASS: 228.88 G
LV LATERAL E/E' RATIO: 9 M/S
LV SEPTAL E/E' RATIO: 10.5 M/S
MV PEAK A VEL: 0.59 M/S
MV PEAK E VEL: 0.63 M/S
MV STENOSIS PRESSURE HALF TIME: 83.12 MS
MV VALVE AREA P 1/2 METHOD: 2.65 CM2
PV PEAK VELOCITY: 1.01 CM/S
RA MAJOR: 5.34 CM
RA PRESSURE: 3 MMHG
RA WIDTH: 3.59 CM
RIGHT VENTRICULAR END-DIASTOLIC DIMENSION: 3.31 CM
RV TISSUE DOPPLER FREE WALL SYSTOLIC VELOCITY 1 (APICAL 4 CHAMBER VIEW): 7.16 CM/S
SINUS: 3.3 CM
STJ: 2.66 CM
TDI LATERAL: 0.07 M/S
TDI SEPTAL: 0.06 M/S
TDI: 0.07 M/S
TRICUSPID ANNULAR PLANE SYSTOLIC EXCURSION: 2.02 CM

## 2022-08-03 PROCEDURE — 93306 TTE W/DOPPLER COMPLETE: CPT

## 2022-08-03 PROCEDURE — 93306 TTE W/DOPPLER COMPLETE: CPT | Mod: 26,,, | Performed by: INTERNAL MEDICINE

## 2022-08-03 PROCEDURE — 93306 ECHO (CUPID ONLY): ICD-10-PCS | Mod: 26,,, | Performed by: INTERNAL MEDICINE

## 2022-08-05 ENCOUNTER — HOSPITAL ENCOUNTER (OUTPATIENT)
Dept: RADIOLOGY | Facility: HOSPITAL | Age: 67
Discharge: HOME | End: 2022-08-05
Attending: ORTHOPAEDIC SURGERY

## 2022-08-05 DIAGNOSIS — M17.0 PRIMARY OSTEOARTHRITIS OF BOTH KNEES: ICD-10-CM

## 2022-08-05 PROCEDURE — 73700 CT KNEE WITHOUT CONTRAST RIGHT W/MAKO PROTOCOL: ICD-10-PCS | Mod: 26,RT,, | Performed by: RADIOLOGY

## 2022-08-05 PROCEDURE — 73700 CT LOWER EXTREMITY W/O DYE: CPT | Mod: TC,RT

## 2022-08-05 PROCEDURE — 73700 CT LOWER EXTREMITY W/O DYE: CPT | Mod: 26,RT,, | Performed by: RADIOLOGY

## 2022-08-12 ENCOUNTER — LAB VISIT (OUTPATIENT)
Dept: LAB | Facility: HOSPITAL | Age: 67
End: 2022-08-12
Attending: ORTHOPAEDIC SURGERY
Payer: COMMERCIAL

## 2022-08-12 DIAGNOSIS — Z01.818 PREOPERATIVE TESTING: ICD-10-CM

## 2022-08-12 LAB
ABO + RH BLD: NORMAL
BLD GP AB SCN CELLS X3 SERPL QL: NORMAL

## 2022-08-12 PROCEDURE — 36415 COLL VENOUS BLD VENIPUNCTURE: CPT | Performed by: ORTHOPAEDIC SURGERY

## 2022-08-12 PROCEDURE — 86850 RBC ANTIBODY SCREEN: CPT | Performed by: ORTHOPAEDIC SURGERY

## 2022-08-15 ENCOUNTER — ANESTHESIA (OUTPATIENT)
Dept: SURGERY | Facility: HOSPITAL | Age: 67
End: 2022-08-15
Payer: COMMERCIAL

## 2022-08-15 ENCOUNTER — HOSPITAL ENCOUNTER (OUTPATIENT)
Facility: HOSPITAL | Age: 67
Discharge: HOME HEALTH | End: 2022-08-16
Attending: ORTHOPAEDIC SURGERY | Admitting: ORTHOPAEDIC SURGERY
Payer: COMMERCIAL

## 2022-08-15 DIAGNOSIS — M17.11 PRIMARY OSTEOARTHRITIS OF RIGHT KNEE: Primary | ICD-10-CM

## 2022-08-15 LAB
ANION GAP SERPL CALC-SCNC: 9 MMOL/L (ref 8–16)
BUN SERPL-MCNC: 14 MG/DL (ref 8–23)
CALCIUM SERPL-MCNC: 8.5 MG/DL (ref 8.7–10.5)
CHLORIDE SERPL-SCNC: 108 MMOL/L (ref 95–110)
CO2 SERPL-SCNC: 23 MMOL/L (ref 23–29)
CREAT SERPL-MCNC: 0.7 MG/DL (ref 0.5–1.4)
EST. GFR  (NO RACE VARIABLE): >60 ML/MIN/1.73 M^2
GLUCOSE SERPL-MCNC: 108 MG/DL (ref 70–110)
HCT VFR BLD AUTO: 35.9 % (ref 37–48.5)
HGB BLD-MCNC: 12 G/DL (ref 12–16)
POTASSIUM SERPL-SCNC: 4.3 MMOL/L (ref 3.5–5.1)
SODIUM SERPL-SCNC: 140 MMOL/L (ref 136–145)

## 2022-08-15 PROCEDURE — C1751 CATH, INF, PER/CENT/MIDLINE: HCPCS | Performed by: ANESTHESIOLOGY

## 2022-08-15 PROCEDURE — 37000009 HC ANESTHESIA EA ADD 15 MINS: Performed by: ORTHOPAEDIC SURGERY

## 2022-08-15 PROCEDURE — D9220A PRA ANESTHESIA: Mod: ANES,,, | Performed by: ANESTHESIOLOGY

## 2022-08-15 PROCEDURE — 64447 PR NERVE BLOCK INJ, ANES/STEROID, FEMORAL, INCL IMAG GUIDANCE: ICD-10-PCS | Mod: 59,RT,, | Performed by: ANESTHESIOLOGY

## 2022-08-15 PROCEDURE — D9220A PRA ANESTHESIA: ICD-10-PCS | Mod: ANES,,, | Performed by: ANESTHESIOLOGY

## 2022-08-15 PROCEDURE — 85014 HEMATOCRIT: CPT | Performed by: ORTHOPAEDIC SURGERY

## 2022-08-15 PROCEDURE — 64447 NJX AA&/STRD FEMORAL NRV IMG: CPT | Mod: 59,RT | Performed by: ANESTHESIOLOGY

## 2022-08-15 PROCEDURE — 76942 PR U/S GUIDANCE FOR NEEDLE GUIDANCE: ICD-10-PCS | Mod: 26,,, | Performed by: ANESTHESIOLOGY

## 2022-08-15 PROCEDURE — 27200688 HC TRAY, SPINAL-HYPER/ ISOBARIC: Performed by: ANESTHESIOLOGY

## 2022-08-15 PROCEDURE — 36000711: Performed by: ORTHOPAEDIC SURGERY

## 2022-08-15 PROCEDURE — 71000039 HC RECOVERY, EACH ADD'L HOUR: Performed by: ORTHOPAEDIC SURGERY

## 2022-08-15 PROCEDURE — 63600175 PHARM REV CODE 636 W HCPCS: Performed by: ORTHOPAEDIC SURGERY

## 2022-08-15 PROCEDURE — 25000003 PHARM REV CODE 250: Performed by: ORTHOPAEDIC SURGERY

## 2022-08-15 PROCEDURE — 27201423 OPTIME MED/SURG SUP & DEVICES STERILE SUPPLY: Performed by: ORTHOPAEDIC SURGERY

## 2022-08-15 PROCEDURE — 63600175 PHARM REV CODE 636 W HCPCS: Performed by: ANESTHESIOLOGY

## 2022-08-15 PROCEDURE — 64447 NJX AA&/STRD FEMORAL NRV IMG: CPT | Mod: 59,RT,, | Performed by: ANESTHESIOLOGY

## 2022-08-15 PROCEDURE — 76942 ECHO GUIDE FOR BIOPSY: CPT | Mod: 26,,, | Performed by: ANESTHESIOLOGY

## 2022-08-15 PROCEDURE — 71000033 HC RECOVERY, INTIAL HOUR: Performed by: ORTHOPAEDIC SURGERY

## 2022-08-15 PROCEDURE — 25000003 PHARM REV CODE 250: Performed by: ANESTHESIOLOGY

## 2022-08-15 PROCEDURE — 63600175 PHARM REV CODE 636 W HCPCS: Performed by: NURSE ANESTHETIST, CERTIFIED REGISTERED

## 2022-08-15 PROCEDURE — 36415 COLL VENOUS BLD VENIPUNCTURE: CPT | Performed by: ORTHOPAEDIC SURGERY

## 2022-08-15 PROCEDURE — D9220A PRA ANESTHESIA: ICD-10-PCS | Mod: CRNA,,, | Performed by: NURSE ANESTHETIST, CERTIFIED REGISTERED

## 2022-08-15 PROCEDURE — 85018 HEMOGLOBIN: CPT | Performed by: ORTHOPAEDIC SURGERY

## 2022-08-15 PROCEDURE — 27200710 HC EPIDURAL INFUSION PUMP SET: Performed by: ANESTHESIOLOGY

## 2022-08-15 PROCEDURE — 80048 BASIC METABOLIC PNL TOTAL CA: CPT | Performed by: ORTHOPAEDIC SURGERY

## 2022-08-15 PROCEDURE — 36000710: Performed by: ORTHOPAEDIC SURGERY

## 2022-08-15 PROCEDURE — 37000008 HC ANESTHESIA 1ST 15 MINUTES: Performed by: ORTHOPAEDIC SURGERY

## 2022-08-15 PROCEDURE — D9220A PRA ANESTHESIA: Mod: CRNA,,, | Performed by: NURSE ANESTHETIST, CERTIFIED REGISTERED

## 2022-08-15 PROCEDURE — C1713 ANCHOR/SCREW BN/BN,TIS/BN: HCPCS | Performed by: ORTHOPAEDIC SURGERY

## 2022-08-15 PROCEDURE — C1776 JOINT DEVICE (IMPLANTABLE): HCPCS | Performed by: ORTHOPAEDIC SURGERY

## 2022-08-15 DEVICE — CEMENT BONE SMPLX HV GENTMYCN: Type: IMPLANTABLE DEVICE | Site: KNEE | Status: FUNCTIONAL

## 2022-08-15 DEVICE — BASEPLATE TIB CEM PRIM SZ 3: Type: IMPLANTABLE DEVICE | Site: KNEE | Status: FUNCTIONAL

## 2022-08-15 DEVICE — FEMORAL CRUC RTN CEM SZ 3 RT: Type: IMPLANTABLE DEVICE | Site: KNEE | Status: FUNCTIONAL

## 2022-08-15 DEVICE — INSERT TIBIAL SZ 3 9MM: Type: IMPLANTABLE DEVICE | Site: KNEE | Status: FUNCTIONAL

## 2022-08-15 RX ORDER — ROPIVACAINE HYDROCHLORIDE 5 MG/ML
INJECTION, SOLUTION EPIDURAL; INFILTRATION; PERINEURAL
Status: COMPLETED | OUTPATIENT
Start: 2022-08-15 | End: 2022-08-15

## 2022-08-15 RX ORDER — CEFADROXIL 500 MG/1
500 CAPSULE ORAL EVERY 12 HOURS
Qty: 20 CAPSULE | Refills: 0 | Status: SHIPPED | OUTPATIENT
Start: 2022-08-15 | End: 2022-08-26

## 2022-08-15 RX ORDER — ESOMEPRAZOLE MAGNESIUM 40 MG/1
40 CAPSULE, DELAYED RELEASE ORAL
Qty: 30 CAPSULE | Refills: 0 | Status: SHIPPED | OUTPATIENT
Start: 2022-08-15 | End: 2022-11-09

## 2022-08-15 RX ORDER — HYDROMORPHONE HYDROCHLORIDE 2 MG/ML
0.2 INJECTION, SOLUTION INTRAMUSCULAR; INTRAVENOUS; SUBCUTANEOUS EVERY 5 MIN PRN
Status: COMPLETED | OUTPATIENT
Start: 2022-08-15 | End: 2022-08-15

## 2022-08-15 RX ORDER — TRANEXAMIC ACID 100 MG/ML
1000 INJECTION, SOLUTION INTRAVENOUS ONCE
Status: COMPLETED | OUTPATIENT
Start: 2022-08-15 | End: 2022-08-15

## 2022-08-15 RX ORDER — CELECOXIB 100 MG/1
400 CAPSULE ORAL ONCE
Status: COMPLETED | OUTPATIENT
Start: 2022-08-15 | End: 2022-08-15

## 2022-08-15 RX ORDER — DEXAMETHASONE SODIUM PHOSPHATE 4 MG/ML
8 INJECTION, SOLUTION INTRA-ARTICULAR; INTRALESIONAL; INTRAMUSCULAR; INTRAVENOUS; SOFT TISSUE EVERY 24 HOURS
Status: COMPLETED | OUTPATIENT
Start: 2022-08-15 | End: 2022-08-16

## 2022-08-15 RX ORDER — HALOPERIDOL 5 MG/ML
0.5 INJECTION INTRAMUSCULAR EVERY 10 MIN PRN
Status: DISCONTINUED | OUTPATIENT
Start: 2022-08-15 | End: 2022-08-15 | Stop reason: HOSPADM

## 2022-08-15 RX ORDER — PROMETHAZINE HYDROCHLORIDE 25 MG/1
25 TABLET ORAL EVERY 6 HOURS PRN
Qty: 60 TABLET | Refills: 0 | Status: SHIPPED | OUTPATIENT
Start: 2022-08-15 | End: 2022-11-09

## 2022-08-15 RX ORDER — MUPIROCIN 20 MG/G
OINTMENT TOPICAL 2 TIMES DAILY
Status: DISCONTINUED | OUTPATIENT
Start: 2022-08-15 | End: 2022-08-16 | Stop reason: HOSPADM

## 2022-08-15 RX ORDER — ASPIRIN 81 MG/1
81 TABLET ORAL 2 TIMES DAILY
Qty: 60 TABLET | Refills: 0 | Status: ON HOLD | OUTPATIENT
Start: 2022-08-15 | End: 2024-01-23

## 2022-08-15 RX ORDER — BUPIVACAINE HYDROCHLORIDE 2.5 MG/ML
INJECTION, SOLUTION INFILTRATION; PERINEURAL
Status: DISCONTINUED | OUTPATIENT
Start: 2022-08-15 | End: 2022-08-15 | Stop reason: HOSPADM

## 2022-08-15 RX ORDER — ONDANSETRON 2 MG/ML
4 INJECTION INTRAMUSCULAR; INTRAVENOUS DAILY PRN
Status: DISCONTINUED | OUTPATIENT
Start: 2022-08-15 | End: 2022-08-15 | Stop reason: HOSPADM

## 2022-08-15 RX ORDER — SODIUM CHLORIDE 9 MG/ML
INJECTION, SOLUTION INTRAVENOUS CONTINUOUS
Status: DISCONTINUED | OUTPATIENT
Start: 2022-08-15 | End: 2022-08-16 | Stop reason: HOSPADM

## 2022-08-15 RX ORDER — PROPOFOL 10 MG/ML
VIAL (ML) INTRAVENOUS CONTINUOUS PRN
Status: DISCONTINUED | OUTPATIENT
Start: 2022-08-15 | End: 2022-08-15

## 2022-08-15 RX ORDER — SODIUM CHLORIDE 0.9 % (FLUSH) 0.9 %
10 SYRINGE (ML) INJECTION
Status: DISCONTINUED | OUTPATIENT
Start: 2022-08-15 | End: 2022-08-15 | Stop reason: HOSPADM

## 2022-08-15 RX ORDER — ACETAMINOPHEN 500 MG
1000 TABLET ORAL
Status: COMPLETED | OUTPATIENT
Start: 2022-08-15 | End: 2022-08-15

## 2022-08-15 RX ORDER — PROMETHAZINE HYDROCHLORIDE 25 MG/1
25 TABLET ORAL EVERY 6 HOURS PRN
Status: DISCONTINUED | OUTPATIENT
Start: 2022-08-15 | End: 2022-08-16 | Stop reason: HOSPADM

## 2022-08-15 RX ORDER — OXYCODONE AND ACETAMINOPHEN 10; 325 MG/1; MG/1
1 TABLET ORAL EVERY 8 HOURS PRN
Qty: 21 TABLET | Refills: 0 | Status: SHIPPED | OUTPATIENT
Start: 2022-08-15 | End: 2022-08-23

## 2022-08-15 RX ORDER — MORPHINE SULFATE 4 MG/ML
4 INJECTION, SOLUTION INTRAMUSCULAR; INTRAVENOUS EVERY 4 HOURS PRN
Status: DISCONTINUED | OUTPATIENT
Start: 2022-08-15 | End: 2022-08-16 | Stop reason: HOSPADM

## 2022-08-15 RX ORDER — SODIUM CHLORIDE, SODIUM LACTATE, POTASSIUM CHLORIDE, CALCIUM CHLORIDE 600; 310; 30; 20 MG/100ML; MG/100ML; MG/100ML; MG/100ML
INJECTION, SOLUTION INTRAVENOUS CONTINUOUS
Status: DISCONTINUED | OUTPATIENT
Start: 2022-08-15 | End: 2022-08-15

## 2022-08-15 RX ORDER — FAMOTIDINE 20 MG/1
20 TABLET, FILM COATED ORAL 2 TIMES DAILY
Status: DISCONTINUED | OUTPATIENT
Start: 2022-08-15 | End: 2022-08-16 | Stop reason: HOSPADM

## 2022-08-15 RX ORDER — CEFAZOLIN SODIUM 2 G/50ML
2 SOLUTION INTRAVENOUS
Status: COMPLETED | OUTPATIENT
Start: 2022-08-15 | End: 2022-08-16

## 2022-08-15 RX ORDER — DOCUSATE SODIUM 100 MG/1
100 CAPSULE, LIQUID FILLED ORAL 2 TIMES DAILY PRN
Qty: 60 CAPSULE | Refills: 0 | Status: SHIPPED | OUTPATIENT
Start: 2022-08-15 | End: 2024-01-20

## 2022-08-15 RX ORDER — FENTANYL CITRATE 50 UG/ML
INJECTION, SOLUTION INTRAMUSCULAR; INTRAVENOUS
Status: DISCONTINUED | OUTPATIENT
Start: 2022-08-15 | End: 2022-08-15

## 2022-08-15 RX ORDER — MIDAZOLAM HYDROCHLORIDE 1 MG/ML
INJECTION, SOLUTION INTRAMUSCULAR; INTRAVENOUS
Status: DISCONTINUED | OUTPATIENT
Start: 2022-08-15 | End: 2022-08-15

## 2022-08-15 RX ORDER — KETOROLAC TROMETHAMINE 10 MG/1
10 TABLET, FILM COATED ORAL EVERY 6 HOURS
Qty: 12 TABLET | Refills: 0 | Status: SHIPPED | OUTPATIENT
Start: 2022-08-15 | End: 2022-08-19

## 2022-08-15 RX ORDER — ONDANSETRON 2 MG/ML
INJECTION INTRAMUSCULAR; INTRAVENOUS
Status: DISCONTINUED | OUTPATIENT
Start: 2022-08-15 | End: 2022-08-15

## 2022-08-15 RX ORDER — NAPROXEN SODIUM 220 MG/1
81 TABLET, FILM COATED ORAL 2 TIMES DAILY
Status: DISCONTINUED | OUTPATIENT
Start: 2022-08-15 | End: 2022-08-16 | Stop reason: HOSPADM

## 2022-08-15 RX ORDER — TRIAMTERENE AND HYDROCHLOROTHIAZIDE 37.5; 25 MG/1; MG/1
1 CAPSULE ORAL DAILY
Status: DISCONTINUED | OUTPATIENT
Start: 2022-08-16 | End: 2022-08-16 | Stop reason: HOSPADM

## 2022-08-15 RX ORDER — CELECOXIB 100 MG/1
200 CAPSULE ORAL DAILY
Status: DISCONTINUED | OUTPATIENT
Start: 2022-08-16 | End: 2022-08-16 | Stop reason: HOSPADM

## 2022-08-15 RX ORDER — VANCOMYCIN HCL IN 5 % DEXTROSE 1G/250ML
1000 PLASTIC BAG, INJECTION (ML) INTRAVENOUS
Status: COMPLETED | OUTPATIENT
Start: 2022-08-15 | End: 2022-08-15

## 2022-08-15 RX ORDER — OXYCODONE HYDROCHLORIDE 5 MG/1
10 TABLET ORAL
Status: DISCONTINUED | OUTPATIENT
Start: 2022-08-15 | End: 2022-08-16 | Stop reason: HOSPADM

## 2022-08-15 RX ORDER — CEFAZOLIN SODIUM 2 G/50ML
2 SOLUTION INTRAVENOUS
Status: COMPLETED | OUTPATIENT
Start: 2022-08-15 | End: 2022-08-15

## 2022-08-15 RX ADMIN — HYDROMORPHONE HYDROCHLORIDE 0.2 MG: 2 INJECTION, SOLUTION INTRAMUSCULAR; INTRAVENOUS; SUBCUTANEOUS at 04:08

## 2022-08-15 RX ADMIN — HYDROMORPHONE HYDROCHLORIDE 0.2 MG: 2 INJECTION, SOLUTION INTRAMUSCULAR; INTRAVENOUS; SUBCUTANEOUS at 03:08

## 2022-08-15 RX ADMIN — SODIUM CHLORIDE, SODIUM LACTATE, POTASSIUM CHLORIDE, AND CALCIUM CHLORIDE: .6; .31; .03; .02 INJECTION, SOLUTION INTRAVENOUS at 03:08

## 2022-08-15 RX ADMIN — CEFAZOLIN SODIUM 2 G: 2 SOLUTION INTRAVENOUS at 09:08

## 2022-08-15 RX ADMIN — TRANEXAMIC ACID 1000 MG: 100 INJECTION, SOLUTION INTRAVENOUS at 01:08

## 2022-08-15 RX ADMIN — ONDANSETRON 4 MG: 2 INJECTION, SOLUTION INTRAMUSCULAR; INTRAVENOUS at 02:08

## 2022-08-15 RX ADMIN — PROMETHAZINE HYDROCHLORIDE 25 MG: 25 TABLET ORAL at 06:08

## 2022-08-15 RX ADMIN — ASPIRIN 81 MG CHEWABLE TABLET 81 MG: 81 TABLET CHEWABLE at 09:08

## 2022-08-15 RX ADMIN — SODIUM CHLORIDE: 0.9 INJECTION, SOLUTION INTRAVENOUS at 06:08

## 2022-08-15 RX ADMIN — VANCOMYCIN HYDROCHLORIDE 1000 MG: 1 INJECTION, POWDER, LYOPHILIZED, FOR SOLUTION INTRAVENOUS at 12:08

## 2022-08-15 RX ADMIN — SODIUM CHLORIDE, SODIUM LACTATE, POTASSIUM CHLORIDE, AND CALCIUM CHLORIDE: .6; .31; .03; .02 INJECTION, SOLUTION INTRAVENOUS at 11:08

## 2022-08-15 RX ADMIN — PROPOFOL 20 MCG/KG/MIN: 10 INJECTION, EMULSION INTRAVENOUS at 01:08

## 2022-08-15 RX ADMIN — OXYCODONE 10 MG: 5 TABLET ORAL at 09:08

## 2022-08-15 RX ADMIN — FENTANYL CITRATE 50 MCG: 50 INJECTION, SOLUTION INTRAMUSCULAR; INTRAVENOUS at 01:08

## 2022-08-15 RX ADMIN — ROPIVACAINE HYDROCHLORIDE 20 ML: 5 INJECTION, SOLUTION EPIDURAL; INFILTRATION; PERINEURAL at 12:08

## 2022-08-15 RX ADMIN — TRANEXAMIC ACID 1000 MG: 100 INJECTION, SOLUTION INTRAVENOUS at 02:08

## 2022-08-15 RX ADMIN — CEFAZOLIN SODIUM 2 G: 2 SOLUTION INTRAVENOUS at 01:08

## 2022-08-15 RX ADMIN — SODIUM CHLORIDE, SODIUM LACTATE, POTASSIUM CHLORIDE, AND CALCIUM CHLORIDE: .6; .31; .03; .02 INJECTION, SOLUTION INTRAVENOUS at 12:08

## 2022-08-15 RX ADMIN — DEXAMETHASONE SODIUM PHOSPHATE 8 MG: 4 INJECTION INTRA-ARTICULAR; INTRALESIONAL; INTRAMUSCULAR; INTRAVENOUS; SOFT TISSUE at 06:08

## 2022-08-15 RX ADMIN — FAMOTIDINE 20 MG: 20 TABLET ORAL at 09:08

## 2022-08-15 RX ADMIN — MUPIROCIN: 20 OINTMENT TOPICAL at 09:08

## 2022-08-15 RX ADMIN — ACETAMINOPHEN 1000 MG: 500 TABLET ORAL at 11:08

## 2022-08-15 RX ADMIN — CELECOXIB 400 MG: 100 CAPSULE ORAL at 06:08

## 2022-08-15 RX ADMIN — MIDAZOLAM HYDROCHLORIDE 2 MG: 1 INJECTION, SOLUTION INTRAMUSCULAR; INTRAVENOUS at 12:08

## 2022-08-15 RX ADMIN — MEPIVACAINE HYDROCHLORIDE 2.4 ML: 20 INJECTION, SOLUTION EPIDURAL; INFILTRATION at 01:08

## 2022-08-15 NOTE — OR NURSING
1238 - time out done for abductor canal block per Dr Peñaloaz  Pt on monitors  1245 - block complete - pt tolerated well

## 2022-08-15 NOTE — PLAN OF CARE
[facility]    HOME HEALTH ORDERS  FACE TO FACE ENCOUNTER    Patient Name: Apoorva Fernandez  YOB: 1955    PCP: Najma Rangel MD   PCP Address: [address] / [place] [address]  PCP Phone Number: [phone]  PCP Fax: [phone]       Encounter Date: 08/15/2022    Admit to Home Health    Diagnoses:  Active Hospital Problems    Diagnosis  POA    *Primary osteoarthritis of right knee [M17.11]  Yes      Resolved Hospital Problems   No resolved problems to display.       Future Appointments   Date Time Provider Department Center   11/9/2022  2:20 PM Najma Rangel MD [facility]      Follow-up Information     Jass Willis MD Follow up in 2 week(s).    Specialty: Orthopedic Surgery  Contact information:  [address] [facility]  [address]  [place] [id number] [phone]                           I have seen and examined this patient face to face today. My clinical findings that support the need for the home health skilled services and home bound status are the following:  Weakness/numbness causing balance and gait disturbance due to Joint Replacement making it taxing to leave home.    Allergies:  Review of patient's allergies indicates:   Allergen Reactions    Antihistamines - alkylamine Shortness Of Breath    Atorvastatin      Other reaction(s): myalgia  Other reaction(s): myalgia    Ezetimibe      Other reaction(s): muscle cramps  Other reaction(s): muscle cramps       Diet: regular diet    Activities: activity as tolerated    Notify MD if SBP > 160 or < 90; DBP > 90 or < 50; HR > 120 or < 50; Temp > 101      CONSULTS:    Physical Therapy to evaluate and treat. Evaluate for home safety and equipment needs; Establish/upgrade home exercise program. Perform / instruct on therapeutic exercises, gait training, transfer training, and Range of Motion.  Occupational Therapy to evaluate and treat. Evaluate home environment for safety and equipment needs. Perform/Instruct  on transfers, ADL training, ROM, and therapeutic exercises.    WOUND CARE ORDERS  no      Medications: Review discharge medications with patient and family and provide education.      Current Discharge Medication List      START taking these medications    Details   cefadroxil (DURICEF) 500 MG Cap Take 1 capsule (500 mg total) by mouth every 12 (twelve) hours. for 10 days  Qty: 20 capsule, Refills: 0      docusate sodium (COLACE) 100 MG capsule Take 1 capsule (100 mg total) by mouth 2 (two) times daily as needed for Constipation.  Qty: 60 capsule, Refills: 0      esomeprazole (NEXIUM) 40 MG capsule Take 1 capsule (40 mg total) by mouth before breakfast.  Qty: 30 capsule, Refills: 0      ketorolac (TORADOL) 10 mg tablet Take 1 tablet (10 mg total) by mouth every 6 (six) hours. for 3 days  Qty: 12 tablet, Refills: 0      oxyCODONE-acetaminophen (PERCOCET)  mg per tablet Take 1 tablet by mouth every 8 (eight) hours as needed for Pain.  Qty: 21 tablet, Refills: 0      promethazine (PHENERGAN) 25 MG tablet Take 1 tablet (25 mg total) by mouth every 6 (six) hours as needed for Nausea.  Qty: 60 tablet, Refills: 0         CONTINUE these medications which have CHANGED    Details   aspirin (ECOTRIN) 81 MG EC tablet Take 1 tablet (81 mg total) by mouth 2 (two) times a day.  Qty: 60 tablet, Refills: 0         CONTINUE these medications which have NOT CHANGED    Details   famotidine (PEPCID) 20 MG tablet Take 20 mg by mouth 2 (two) times daily.      lidocaine (LIDODERM) 5 % Place 1 patch onto the skin every 24 hours. Remove & Discard patch within 12 hours or as directed by MD      omega 3-dha-epa-fish oil 1,000 (120-180) mg Cap Take 1 capsule by mouth once daily.      fluticasone propionate (FLONASE) 50 mcg/actuation nasal spray SPRAY 2 SPRAYs INTO EACH NOSTRIL ONCE EVERY DAY  Qty: 48 g, Refills: 3    Associated Diagnoses: Allergic rhinitis      triamterene-hydrochlorothiazide 37.5-25 mg (DYAZIDE) 37.5-25 mg per capsule  Take 1 capsule by mouth every morning.  Qty: 30 capsule, Refills: 11    Comments: .             I certify that this patient is confined to her home and needs physical therapy and occupational therapy.

## 2022-08-15 NOTE — BRIEF OP NOTE
[facility] - Surgery  Brief Operative Note    SUMMARY     Surgery Date: 8/15/2022     Surgeon(s) and Role:     * Jass Willis MD - Primary    Assisting Surgeon: None    Pre-op Diagnosis:  Primary osteoarthritis of right knee [M17.11]    Post-op Diagnosis:  Post-Op Diagnosis Codes:     * Primary osteoarthritis of right knee [M17.11]    Procedure(s) (LRB):  ARTHROPLASTY, KNEE, TOTAL- JOAN (Right)    Anesthesia: Spinal    Operative Findings: right knee DJD    Estimated Blood Loss: 200 mL    Estimated Blood Loss has been documented.         Specimens:   Specimen (24h ago, onward)            None          [id number]

## 2022-08-15 NOTE — ANESTHESIA PROCEDURE NOTES
Spinal    Diagnosis: R knee OA  Patient location during procedure: OR  Start time: 8/15/2022 12:56 PM  Timeout: 8/15/2022 12:55 PM  End time: 8/15/2022 1:06 PM    Staffing  Authorizing Provider: Hayes Peñaloza MD  Performing Provider: Hayes Peñaloza MD    Preanesthetic Checklist  Completed: patient identified, IV checked, site marked, risks and benefits discussed, surgical consent, monitors and equipment checked, pre-op evaluation and timeout performed  Spinal Block  Patient position: sitting  Prep: ChloraPrep  Patient monitoring: heart rate, cardiac monitor, continuous pulse ox, continuous capnometry and frequent blood pressure checks  Approach: midline  Location: L4-5  Injection technique: single shot  CSF Fluid: clear free-flowing CSF  Needle  Needle type: pencil-tip   Needle gauge: 25 G  Needle length: 3.5 in  Additional Documentation: no paresthesia on injection  Needle localization: anatomical landmarks  Assessment  Ease of block: easy  Patient's tolerance of the procedure: comfortable throughout block and no complaints  Medications:    Medications: mepivacaine (CARBOCAINE) injection 20 mg/mL (2%) - Intrathecal   2.4 mL - 8/15/2022 1:06:00 PM

## 2022-08-15 NOTE — OP NOTE
DATE OF PROCEDURE: 08/15/2022    PREOPERATIVE DIAGNOSIS: Osteoarthritis right knee.     POSTOPERATIVE DIAGNOSIS: Osteoarthritis right knee.     PROCEDURE: 1) Right total knee replacement (CPT #20789)   2) Computer assisted surgical navigation (CPT #46099)    SURGEON: Jass Willis M.D.     ASSISTANT: Corey Beck    ANESTHESIA: spinal      ESTIMATED BLOOD LOSS: 200 mL.     Complications: None    Specimen: Bone and soft tissue    INDICATIONS: The patient is a 67 y.o. female with a longstanding history of right knee pain. Radiographs revealed advanced arthritis of the right knee.  She had failed extensive conservative treatment at this point. Treatment options were discussed and the patient elected to proceed with total knee replacement with computer navigation/robotic assistance. Risks and complications were discussed including, but not limited to the risks of anesthetic complications, infections, wound healing complications, DVT, pulmonary embolism, death, continued pain, stiffness and need for further surgery among others and she elected to proceed. Perioperative medical risks were also discussed.     COMPONENTS USED: Alan Triathlon size 3 right CR  femoral component, 3 Triathlon tibial component, 9 mm CR tibial insert, 27 mm patellar component.     BEGINNING COMPUTER MEASURED ALIGNMENT: Varus 6 Valgus 0 Flexion 12    ENDING COMPUTER MEASURED ALIGNMENT: Varus 3 Valgus 0 Flexion 6    DESCRIPTION OF PROCEDURE: The patient was taken to the Operating Room where general anesthesia was administered by the Anesthesia Department. She was then placed in the supine position. All superficial neurovascular structures were well padded. The right lower extremity was then sterilely prepped and draped in the normal fashion.     Under tourniquet control, a 20 cm longitudinal incision was made over the anterior aspect of the knee.  Subcutaneous tissue was sharply dissected down to the deep fascia, which was incised along the line  of the incision.  A standard medial parapatellar arthrotomy was made.  The proximal medial tibial plateau was then subperiosteally exposed protecting the medial collateral ligament.  A portion of the infrapatellar fat pad was excised.  The patella was everted and the knee was flexed to 90 degrees.    The patellar cutting guide was then used to remove the dorsal 8 mm of the patellar surface to a final thickness of 14 mm.  This was sized to a size 27. Drill holes were placed.    The tibial and femoral check points were then placed in the standard fashion. The tracking arrays were then placed by placing 2 parallel Schanz pins in both the tibia and the femur. The tibial pins were placed anteromedially approximately 5 cm distal to the tibial tubercle. The femoral pins were placed directly anterior approximately 5 cm proximal to the superior pole of the patella.     After confirming the check points, the hip was rotated to assess the center of rotation of the hip.     The femoral and tibial surfaces were then mapped in the standard fashion for JOAN total knee replacemen. Once surface mapping was complete, the knee was placed infull extension while applying gentle varus and valgus stress followed by flexion at 90 degrees using graduated spoons to assess soft tissue tightness and the flexion-extension tension gaps were equalized by adjusting the component positions.     Once this was completed, the [employer] unit was placed and secured and check points on the robotic arm were confirmed. The femoral preparation using the saw was first performed in the standard fashion by cutting the distal, posterior champher, anterior, and anterior champher cuts and once the femoral preparation was complete, the tibial cut was performed protecting the patellar tendon and patellar tendon.  The ACL was cut and the PCL retained.  All bone fragments were removed and the medial and lateral menisci were removed. Once completion of both femoral and  tibial preparation was done, the tibial baseplate trial was placed, impacting the keel into place.The femoral trial was then impacted as well. The 9 mm trial implant was then placed. The knee was placed through a range of motion and tension was checked throughout full range of motion was noted to be satisfactory.     At this time, all trial components were removed. The tibial, femoral, and patellar surfaces were thoroughly irrigated in a pulse lavage fashion and dried, and two batches of cement were mixed. The tibial component was impacted in position and held in place as any excess cement was removed using cement removal tools. The femoral and patellar components were then cemented in position, impacted, any excess cement was removed using the cement removal tools. The trial tibial liner was placed while the cement polymerized.  Any residual soft tissue impingement or soft tissue debris was removed and the final tibial polyethylene was then locked into position.     The wound was then thoroughly irrigated. The check points were removed along with the Schanz pins for the tibial and femoral arrays.    The capsule and parapatellar retinaculum were then closed with interrupted figure-of-eight sutures of #1 Vicryl, subcutaneous tissue was closed with interrupted inverted stitches of #3-0 Vicryl. Skin was approximated using skin staples. Sterile dressing was applied and the patient was returned to the Postanesthesia Care Unit in stable condition.    As the attending surgeon I was physically present for the key/critical portions of the procedure.

## 2022-08-15 NOTE — TRANSFER OF CARE
Anesthesia Transfer of Care Note    Patient: Apoorva Fernandez    Procedure(s) Performed: Procedure(s) (LRB):  ARTHROPLASTY, KNEE, TOTAL- JOAN (Right)    Patient location: PACU    Anesthesia Type: spinal    Transport from OR: Transported from OR on room air with adequate spontaneous ventilation    Post pain: adequate analgesia    Post assessment: no apparent anesthetic complications and tolerated procedure well    Post vital signs: stable    Level of consciousness: awake, alert and oriented    Nausea/Vomiting: no nausea/vomiting    Complications: none    Transfer of care protocol was followed      Last vitals:   Visit Vitals  BP (!) 143/69 (BP Location: Right arm, Patient Position: Lying)   Pulse (!) 56   Temp 36.3 °C (97.3 °F) (Skin)   Resp 16   Wt 107.2 kg (236 lb 5 oz)   LMP 08/22/2015 (Exact Date)   SpO2 99%   Breastfeeding No   BMI 46.15 kg/m²

## 2022-08-15 NOTE — ANESTHESIA PROCEDURE NOTES
Peripheral Block    Patient location during procedure: pre-op   Block not for primary anesthetic.  Reason for block: at surgeon's request and post-op pain management   Post-op Pain Location: R knee   Start time: 8/15/2022 12:36 PM  Timeout: 8/15/2022 12:35 PM   End time: 8/15/2022 12:45 PM    Staffing  Authorizing Provider: Hayes Peñaloza MD  Performing Provider: Hayes Peñaloza MD    Preanesthetic Checklist  Completed: patient identified, IV checked, site marked, risks and benefits discussed, surgical consent, monitors and equipment checked, pre-op evaluation and timeout performed  Peripheral Block  Patient position: supine  Prep: ChloraPrep  Patient monitoring: cardiac monitor, continuous pulse ox, frequent blood pressure checks, continuous capnometry and heart rate  Block type: adductor canal  Laterality: right  Injection technique: single shot  Needle  Needle type: Stimuplex   Needle gauge: 22 G  Needle length: 4 in  Needle localization: ultrasound guidance   -ultrasound image captured on disc.  Assessment  Injection assessment: negative aspiration, negative parasthesia and local visualized surrounding nerve  Paresthesia pain: none  Heart rate change: no  Slow fractionated injection: yes  Pain Tolerance: comfortable throughout block and no complaints  Medications:    Medications: ropivacaine (NAROPIN) injection 0.5% - Perineural   20 mL - 8/15/2022 12:45:00 PM

## 2022-08-15 NOTE — ANESTHESIA PREPROCEDURE EVALUATION
08/15/2022  Apoorva Fernandez is a 67 y.o., female.  To undergo Procedure(s) (LRB):  ARTHROPLASTY, KNEE, TOTAL- JOAN (Right)     Denies CP/SOB/GERD/MI/CVA/URI symptoms.  METS > 4  NPO > 8    Past Medical History:  Past Medical History:   Diagnosis Date    Allergic rhinitis     Allergy     Arthritis     Hyperlipidemia     Diet-controlled    Hypertension     Joint pain     Keloid cicatrix     Obesity     Osteoarthritis     Personal history of colonic polyps 2010    Adenomatous    Personal history of peptic ulcer disease     PONV (postoperative nausea and vomiting)     Sleep apnea     Umbilical hernia        Past Surgical History:  Past Surgical History:   Procedure Laterality Date    Abdominal/umbilical hernia repair      removed small part of small bowel; mesh placed     SECTION, LOW TRANSVERSE      X2    CHOLECYSTECTOMY      COLONOSCOPY N/A 3/26/2019    Procedure: COLONOSCOPY;  Surgeon: Chandrika Ho MD;  Location: [facility];  Service: Endoscopy;  Laterality: N/A;    Open laparoscopy for a ruptured ulcer         Social History:  Social History     Socioeconomic History    Marital status:     Number of children: 2   Occupational History    Occupation:    Tobacco Use    Smoking status: Never Smoker    Smokeless tobacco: Never Used   Substance and Sexual Activity    Alcohol use: Yes     Alcohol/week: 2.0 - 3.0 standard drinks     Types: 2 - 3 Glasses of wine per week     Comment: Socially on the weekends    Drug use: No    Sexual activity: Yes     Partners: Male     Social Determinants of Health     Financial Resource Strain: Low Risk     Difficulty of Paying Living Expenses: Not hard at all   Food Insecurity: No Food Insecurity    Worried About Running Out of Food in the Last Year: Never true    Ran Out of Food in the Last Year:  Never true   Transportation Needs: No Transportation Needs    Lack of Transportation (Medical): No    Lack of Transportation (Non-Medical): No   Physical Activity: Insufficiently Active    Days of Exercise per Week: 2 days    Minutes of Exercise per Session: 30 min   Stress: No Stress Concern Present    Feeling of Stress : Not at all   Social Connections: Unknown    Frequency of Communication with Friends and Family: More than three times a week    Frequency of Social Gatherings with Friends and Family: More than three times a week    Active Member of Clubs or Organizations: Yes    Attends Club or Organization Meetings: More than 4 times per year    Marital Status:    Housing Stability: Low Risk     Unable to Pay for Housing in the Last Year: No    Number of Places Lived in the Last Year: 1    Unstable Housing in the Last Year: No       Medications:  No current facility-administered medications on file prior to encounter.     Current Outpatient Medications on File Prior to Encounter   Medication Sig Dispense Refill    aspirin (ECOTRIN) 81 MG EC tablet Take 81 mg by mouth once daily.      famotidine (PEPCID) 20 MG tablet Take 20 mg by mouth 2 (two) times daily.      fluticasone propionate (FLONASE) 50 mcg/actuation nasal spray SPRAY 2 SPRAYs INTO EACH NOSTRIL ONCE EVERY DAY 48 g 3    lidocaine (LIDODERM) 5 % Place 1 patch onto the skin every 24 hours. Remove & Discard patch within 12 hours or as directed by MD      omega 3-dha-epa-fish oil 1,000 (120-180) mg Cap Take 1 capsule by mouth once daily.         Allergies:  Review of patient's allergies indicates:   Allergen Reactions    Antihistamines - alkylamine Shortness Of Breath    Atorvastatin      Other reaction(s): myalgia  Other reaction(s): myalgia    Ezetimibe      Other reaction(s): muscle cramps  Other reaction(s): muscle cramps       Active Problems:  Patient Active Problem List   Diagnosis    Other hyperlipidemia    Personal  history of peptic ulcer disease    Severe obesity (BMI 35.0-39.9) with comorbidity    Allergic rhinitis    Essential hypertension    Postmenopausal bleeding    Primary osteoarthritis of both knees    BELLA (obstructive sleep apnea)    Seronegative rheumatoid arthritis    Primary osteoarthritis involving multiple joints    Incisional hernia    Statin myopathy       Diagnostic Studies:   Latest Reference Range & Units 08/01/22 12:30   WBC 3.90 - 12.70 K/uL 6.50   RBC 4.00 - 5.40 M/uL 4.27   Hemoglobin 12.0 - 16.0 g/dL 13.6   Hematocrit 37.0 - 48.5 % 40.4   MCV 82 - 98 fL 95   MCH 27.0 - 31.0 pg 31.9 (H)   MCHC 32.0 - 36.0 g/dL 33.7   RDW 11.5 - 14.5 % 13.4   Platelets 150 - 450 K/uL 222   MPV 9.2 - 12.9 fL 9.3   Gran % 38.0 - 73.0 % 60.3   Lymph % 18.0 - 48.0 % 25.4   Mono % 4.0 - 15.0 % 10.0   Eosinophil % 0.0 - 8.0 % 3.1   Basophil % 0.0 - 1.9 % 0.6   Immature Granulocytes 0.0 - 0.5 % 0.6 (H)   Gran # (ANC) 1.8 - 7.7 K/uL 3.9   Lymph # 1.0 - 4.8 K/uL 1.7   Mono # 0.3 - 1.0 K/uL 0.7   Eos # 0.0 - 0.5 K/uL 0.2   Baso # 0.00 - 0.20 K/uL 0.04   Immature Grans (Abs) 0.00 - 0.04 K/uL 0.04   nRBC 0 /100 WBC 0   Differential Method  Automated      Latest Reference Range & Units 08/01/22 12:30   Sodium 136 - 145 mmol/L 139   Potassium 3.5 - 5.1 mmol/L 4.6   Chloride 95 - 110 mmol/L 105   CO2 23 - 29 mmol/L 27   Anion Gap 8 - 16 mmol/L 7 (L)   BUN 8 - 23 mg/dL 20   Creatinine 0.5 - 1.4 mg/dL 0.9   eGFR >60 mL/min/1.73 m^2 >60   Glucose 70 - 110 mg/dL 102   Calcium 8.7 - 10.5 mg/dL 9.2   Alkaline Phosphatase 55 - 135 U/L 87   PROTEIN TOTAL 6.0 - 8.4 g/dL 7.5   Albumin 3.5 - 5.2 g/dL 3.7   BILIRUBIN TOTAL 0.1 - 1.0 mg/dL 0.9   AST 10 - 40 U/L 17   ALT 10 - 44 U/L 25     EKG (8/1/22):  Normal sinus rhythm   Right bundle branch block   Left anterior fascicular block   Bifascicular block     TTE (8/3/22):  · TDS secondary to body habitus.  · The left ventricle is normal in size with eccentric hypertrophy and normal  systolic function.  · The estimated ejection fraction is 55%.  · Normal left ventricular diastolic function.  · Normal right ventricular size with normal right ventricular systolic function.  · Normal central venous pressure (3 mmHg).    24 Hour Vitals:  Temp:  [36.7 °C (98.1 °F)] 36.7 °C (98.1 °F)  Pulse:  [68] 68  Resp:  [18] 18  SpO2:  [97 %] 97 %  BP: (134)/(61) 134/61   See Nursing Charting For Additional Vitals      Pre-op Assessment    I have reviewed the Patient Summary Reports.     I have reviewed the Nursing Notes.       Review of Systems  Anesthesia Hx:  Hx of Anesthetic complications  Personal Hx of Anesthesia complications, Post-Operative Nausea/Vomiting, in the past, but not with recent anesthetics / prophylaxis   Social:  Non-Smoker, Social Alcohol Use    EENT/Dental:   chronic allergic rhinitis   Cardiovascular:   Exercise tolerance: good Hypertension hyperlipidemia ECG has been reviewed.    Pulmonary:   Sleep Apnea    Hepatic/GI:   PUD,    Musculoskeletal:   Arthritis  R knee OA   Neurological:  Neurology Normal    Endocrine:  Endocrine Normal        Physical Exam  General: Well nourished    Airway:  Mallampati: III   Mouth Opening: Normal  TM Distance: Normal      Dental:  Intact    Chest/Lungs:  Clear to auscultation    Heart:  Rate: Normal  Rhythm: Regular Rhythm        Anesthesia Plan  Type of Anesthesia, risks & benefits discussed:    Anesthesia Type: Spinal, Regional, Gen ETT  Intra-op Monitoring Plan: Standard ASA Monitors  Post Op Pain Control Plan: multimodal analgesia and IV/PO Opioids PRN  Induction:  IV  Informed Consent: Informed consent signed with the Patient and all parties understand the risks and agree with anesthesia plan.  All questions answered. Patient consented to blood products? Yes  ASA Score: 3  Anesthesia Plan Notes:   R adductor canal PNB  Spinal anesthesia  Standard ASA monitors  Recovery in PACU  PONV: 2    Ready For Surgery From Anesthesia Perspective.     .

## 2022-08-16 VITALS
DIASTOLIC BLOOD PRESSURE: 63 MMHG | BODY MASS INDEX: 46.15 KG/M2 | RESPIRATION RATE: 20 BRPM | OXYGEN SATURATION: 96 % | WEIGHT: 236.31 LBS | HEART RATE: 68 BPM | TEMPERATURE: 98 F | SYSTOLIC BLOOD PRESSURE: 147 MMHG

## 2022-08-16 LAB
ANION GAP SERPL CALC-SCNC: 9 MMOL/L (ref 8–16)
BUN SERPL-MCNC: 12 MG/DL (ref 8–23)
CALCIUM SERPL-MCNC: 8.4 MG/DL (ref 8.7–10.5)
CHLORIDE SERPL-SCNC: 106 MMOL/L (ref 95–110)
CO2 SERPL-SCNC: 23 MMOL/L (ref 23–29)
CREAT SERPL-MCNC: 0.7 MG/DL (ref 0.5–1.4)
EST. GFR  (NO RACE VARIABLE): >60 ML/MIN/1.73 M^2
GLUCOSE SERPL-MCNC: 135 MG/DL (ref 70–110)
HCT VFR BLD AUTO: 35.7 % (ref 37–48.5)
HGB BLD-MCNC: 12 G/DL (ref 12–16)
POTASSIUM SERPL-SCNC: 4.4 MMOL/L (ref 3.5–5.1)
SODIUM SERPL-SCNC: 138 MMOL/L (ref 136–145)

## 2022-08-16 PROCEDURE — 97116 GAIT TRAINING THERAPY: CPT

## 2022-08-16 PROCEDURE — 25000003 PHARM REV CODE 250: Performed by: ORTHOPAEDIC SURGERY

## 2022-08-16 PROCEDURE — 85014 HEMATOCRIT: CPT | Performed by: ORTHOPAEDIC SURGERY

## 2022-08-16 PROCEDURE — 97165 OT EVAL LOW COMPLEX 30 MIN: CPT

## 2022-08-16 PROCEDURE — 36415 COLL VENOUS BLD VENIPUNCTURE: CPT | Performed by: ORTHOPAEDIC SURGERY

## 2022-08-16 PROCEDURE — 97161 PT EVAL LOW COMPLEX 20 MIN: CPT

## 2022-08-16 PROCEDURE — 97535 SELF CARE MNGMENT TRAINING: CPT

## 2022-08-16 PROCEDURE — 85018 HEMOGLOBIN: CPT | Performed by: ORTHOPAEDIC SURGERY

## 2022-08-16 PROCEDURE — 80048 BASIC METABOLIC PNL TOTAL CA: CPT | Performed by: ORTHOPAEDIC SURGERY

## 2022-08-16 PROCEDURE — 63600175 PHARM REV CODE 636 W HCPCS: Performed by: ORTHOPAEDIC SURGERY

## 2022-08-16 RX ADMIN — ASPIRIN 81 MG CHEWABLE TABLET 81 MG: 81 TABLET CHEWABLE at 08:08

## 2022-08-16 RX ADMIN — OXYCODONE 10 MG: 5 TABLET ORAL at 08:08

## 2022-08-16 RX ADMIN — OXYCODONE 10 MG: 5 TABLET ORAL at 05:08

## 2022-08-16 RX ADMIN — CELECOXIB 200 MG: 100 CAPSULE ORAL at 08:08

## 2022-08-16 RX ADMIN — CEFAZOLIN SODIUM 2 G: 2 SOLUTION INTRAVENOUS at 05:08

## 2022-08-16 RX ADMIN — MUPIROCIN: 20 OINTMENT TOPICAL at 08:08

## 2022-08-16 RX ADMIN — FAMOTIDINE 20 MG: 20 TABLET ORAL at 08:08

## 2022-08-16 RX ADMIN — DEXAMETHASONE SODIUM PHOSPHATE 8 MG: 4 INJECTION INTRA-ARTICULAR; INTRALESIONAL; INTRAMUSCULAR; INTRAVENOUS; SOFT TISSUE at 08:08

## 2022-08-16 RX ADMIN — OXYCODONE 10 MG: 5 TABLET ORAL at 01:08

## 2022-08-16 NOTE — PT/OT/SLP EVAL
Occupational Therapy   Evaluation/Treatment    Name: Apoorva Fernandez  MRN: [id number]  Admitting Diagnosis:  Primary osteoarthritis of right knee  Recent Surgery: Procedure(s) (LRB):  ARTHROPLASTY, KNEE, TOTAL- JOAN (Right) 1 Day Post-Op    Recommendations:     Discharge Recommendations: home health OT (with family assist)  Discharge Equipment Recommendations:  bedside commode, walker, rolling  Barriers to discharge:  None    Assessment:     Apoorva Fernandez is a 67 y.o. female with a medical diagnosis of Primary osteoarthritis of right knee.   Performance deficits affecting function: weakness, impaired self care skills, impaired functional mobility, gait instability, impaired balance, visual deficits, decreased upper extremity function, decreased lower extremity function, decreased coordination, pain, decreased ROM, impaired skin, edema, orthopedic precautions.    The patient was able to amb using a RW to the toilet and sink with SBA and verbal cues for RW use. The patient and spouse were educated re: LB dressing s/p TKA, home safety, DME needs and Cryotherapy use. The patient's spouse is aware of the patient's need for assistance for LB dressing and is able to assist at home.    Rehab Prognosis: Good; patient would benefit from acute skilled OT services to address these deficits and reach maximum level of function.       Plan:     Patient to be seen 5 x/week to address the above listed problems via self-care/home management, therapeutic activities  · Plan of Care Expires: 08/18/22  · Plan of Care Reviewed with: patient, spouse    Subjective     Chief Complaint: right knee pain and fatigue from working with PT  Patient/Family Comments/goals: agreeable to OT eval    Occupational Profile:  Living Environment: lives with her spouse in a 2 story house with her bed and bathroom on the 2nd floor. Patient has a tub/shower combo with a HH shower and grab bars present.  Previous level of function: Patient mod I with amb  and able to bathe herself while standing in the shower. The patirny received assist for LB dressing from her spouse.  Roles and Routines: attended out patient PT prior to sx. The patient will sleep on the 1st floor in a recliner and sponge bath in the 1st floor half bath  Equipment Used at Home:  grab bar, wheelchair (Hand held shower)  Assistance upon Discharge: spouse    Pain/Comfort:  Pain Rating 1: 7/10  Location - Side 1: Right  Location 1: knee  Pain Addressed 1: Pre-medicate for activity, Cessation of Activity, Reposition    Patients cultural, spiritual, Worship conflicts given the current situation: no    Objective:     Communicated with: NATASHA Lorenz prior to session.  Patient found up in chair, reclined with peripheral IV upon OT entry to room.    General Precautions: Standard, fall   Orthopedic Precautions:RLE weight bearing as tolerated   Braces: N/A  Respiratory Status: Room air    Occupational Performance:    Bed Mobility:    N/T    Functional Mobility/Transfers:  · Patient completed Sit <> Stand Transfer with contact guard assistance  with  rolling walker   · Patient completed Toilet Transfer Step Transfer technique with contact guard assistance with  rolling walker and verbal cues for RW use and hand placement on the grab bars for stability  · Functional Mobility: The patient amb using a RW from the chair to the sink ~10' with CGA and VC for step sequence and placement of the RW at the sink. The patient tolerated standing at the sink ~1o min for grooming tasks and then amb to the bathroom ~12' with CGA and verbal cues. The patient required VC for stepping and RW while turning toward the sink or toilet.    Activities of Daily Living:  · Grooming: stand by assistance to stand at the sink to brush her teeth and wash her hands and face  · Lower Body Dressing: The patient is D for LB dressing. The pateijnt and spouse were educated verbally and via handout re: LB dressing s/p TKA    · Toileting: stand by  assistance and CGA to manage clothing during transfer. The patient was able to wipe self with set up assist and SBA.  (BSC was placed over the toilet)    Cognitive/Visual Perceptual:  Cognitive/Psychosocial Skills:     -       Oriented to: Person, Place, Time and Situation   -       Follows Commands/attention:Follows two-step commands  -       Communication: clear/fluent  -       Memory: No Deficits noted  -       Safety awareness/insight to disability: intact   -       Mood/Affect/Coping skills/emotional control: Appropriate to situation  Visual/Perceptual:      -Impaired  patient c/o poor reading vision without eye glasses      Physical Exam:  Balance: -       good/fair+  Postural examination/scapula alignment:    -       No postural abnormalities identified  Skin integrity: Visible skin intact and right knee incision covered by dressing  Edema:  present in RLE, increased at ankle and foot below the Ace wrap.  Sensation:    -       Impaired  c/o tingling in right toe tips  Upper Extremity Range of Motion:     -       Right Upper Extremity: WFL  -       Left Upper Extremity: WFL  Upper Extremity Strength:    -       Right Upper Extremity: WFL  -       Left Upper Extremity: WFL    AMPAC 6 Click ADL:  AMPAC Total Score: 17    Treatment & Education:  · Participated in OT eval   · Educated re: OT role and POC  · Performed self care and functional mobility as noted above  · Educated the patient re: LB dressing s/p TKA, cryotherapy use and home safety  · Discussed positioning of the RLE while sleeping in a recliner chair to avoid knee flexion and use of a pillow behind the knee  · Educated the patient re: DME needs for home  · Adjusted right leg ace wrap to decrease pressure for edema management. Karine Alex notified of the patient c/o tingling in the right foot  Education:    Patient left up in chair, reclined with all lines intact, call button in reach, Karine alex notified and spouse present    GOALS:    Multidisciplinary Problems     Occupational Therapy Goals        Problem: Occupational Therapy    Goal Priority Disciplines Outcome Interventions   Occupational Therapy Goal     OT, PT/OT Ongoing, Progressing    Description:   Goals to be met by: 22    Patient will increase functional independence with ADLs by performing:    UE Dressing with Modified [place].  LE Dressing with Supervision and Assistive Devices as needed.  Grooming while standing at sink with Modified [place] and Assistive Devices as needed.  Toileting from toilet with Modified [place] for hygiene and clothing management.   Supine to sit with Modified [place].  Step transfer with Modified [place]  Toilet transfer to toilet with Modified [place].                     History:     Past Medical History:   Diagnosis Date    Allergic rhinitis     Allergy     Arthritis     Hyperlipidemia     Diet-controlled    Hypertension     Joint pain     Keloid cicatrix     Obesity     Osteoarthritis     Personal history of colonic polyps 2010    Adenomatous    Personal history of peptic ulcer disease     PONV (postoperative nausea and vomiting)     Sleep apnea     Umbilical hernia          Past Surgical History:   Procedure Laterality Date    Abdominal/umbilical hernia repair      removed small part of small bowel; mesh placed     SECTION, LOW TRANSVERSE      X2    CHOLECYSTECTOMY      COLONOSCOPY N/A 3/26/2019    Procedure: COLONOSCOPY;  Surgeon: Chandrika Ho MD;  Location: [facility];  Service: Endoscopy;  Laterality: N/A;    Open laparoscopy for a ruptured ulcer         Time Tracking:     OT Date of Treatment: 22  OT Start Time: 939  OT Stop Time: 1017  OT Total Time (min): 38 min    Billable Minutes:Evaluation 15  Self Care/Home Management 23  Total Time 38    2022

## 2022-08-16 NOTE — DISCHARGE SUMMARY
Discharge Summary           Orthopedic Surgery      Admit Date: 8/15/2022    Discharge Date and Time: 8/16/2022    Discharge Attending Physician: Jass Willis MD    Surgery Date: 8/15/22    Procedure performed: Procedure(s) (LRB):  ARTHROPLASTY, KNEE, TOTAL- JOAN (Right)    Diagnoses:  Active Hospital Problems    Diagnosis  POA    *Primary osteoarthritis of right knee [M17.11]  Yes      Resolved Hospital Problems   No resolved problems to display.       Discharged Condition: good    HPI:      Pt is a 67 y.o.female suffering from the above diagnosis of the Right lower extremity. The patient has undergone all non-surgical intervention and at this time she wishes to proceed with a surgical procedure.     Hospital Course:    Pt admitted on 8/15/2022 and taken to the operating room for the above diagnosis. Please refer to the operative noted for details of the operation. Immediately post-operatively, the patient was transferred to the recover room and then to the floor without complications. The patient has progressed well with physical therapy. she is ambulating in the halls with an assistive device. her pain is well controled on PO pain meds alone. she is tolerating a regular diet. she will be d/c'ed home today in stable condition.    Follow up:  Patient will F/U in clinic in 2 weeks    Weight bearing restrictions:  Patient will remain as tolerated    Diet:   general        Consults: PTOT    Significant Diagnostic Studies: surgery    Special Treatments/Procedures: none and surgery: TKA    Disposition: Home or Self Care    Patient Instructions:        Medication List      START taking these medications    cefadroxil 500 MG Cap  Commonly known as: DURICEF  Take 1 capsule (500 mg total) by mouth every 12 (twelve) hours for 10 days     docusate sodium 100 MG capsule  Commonly known as: COLACE  Take 1 capsule (100 mg total) by mouth 2 (two) times daily as needed for Constipation.     esomeprazole 40 MG  capsule  Commonly known as: NEXIUM  Take 1 capsule (40 mg total) by mouth daily before breakfast.     ketorolac 10 mg tablet  Commonly known as: TORADOL  Take 1 tablet (10 mg total) by mouth every 6 (six) hours for 3 days     oxyCODONE-acetaminophen  mg per tablet  Commonly known as: PERCOCET  Take 1 tablet by mouth every 8 (eight) hours as needed for Pain.     promethazine 25 MG tablet  Commonly known as: PHENERGAN  Take 1 tablet (25 mg total) by mouth every 6 (six) hours as needed for Nausea.        CHANGE how you take these medications    aspirin 81 MG EC tablet  Commonly known as: ECOTRIN  Take 1 tablet (81 mg total) by mouth 2 (two) times a day.  What changed: when to take this        CONTINUE taking these medications    famotidine 20 MG tablet  Commonly known as: PEPCID     fluticasone propionate 50 mcg/actuation nasal spray  Commonly known as: FLONASE  SPRAY 2 SPRAYs INTO EACH NOSTRIL ONCE EVERY DAY     LIDOcaine 5 %  Commonly known as: LIDODERM     omega 3-dha-epa-fish oil 1,000 mg (120 mg-180 mg) Cap     triamterene-hydrochlorothiazide 37.5-25 mg 37.5-25 mg per capsule  Commonly known as: DYAZIDE  Take 1 capsule by mouth every morning.           Where to Get Your Medications      These medications were sent to [facility] [id number] [address]    Hours: Mon-Fri, 8a-5:30p Phone: [phone]   · aspirin 81 MG EC tablet  · cefadroxil 500 MG Cap  · docusate sodium 100 MG capsule  · esomeprazole 40 MG capsule  · ketorolac 10 mg tablet  · oxyCODONE-acetaminophen  mg per tablet  · promethazine 25 MG tablet

## 2022-08-16 NOTE — ANESTHESIA POSTPROCEDURE EVALUATION
Anesthesia Post Evaluation    Patient: Apoorva Fernandez    Procedure(s) Performed: Procedure(s) (LRB):  ARTHROPLASTY, KNEE, TOTAL- JOAN (Right)    Final Anesthesia Type: spinal      Patient location during evaluation: PACU  Patient participation: Yes- Able to Participate  Level of consciousness: awake and alert and oriented  Post-procedure vital signs: reviewed and stable  Pain management: adequate  Airway patency: patent    PONV status at discharge: No PONV  Anesthetic complications: no      Cardiovascular status: hemodynamically stable and blood pressure returned to baseline  Respiratory status: spontaneous ventilation, room air and unassisted  Hydration status: euvolemic  Follow-up not needed.          Vitals Value Taken Time   /56 08/16/22 0757   Temp 36.4 °C (97.6 °F) 08/16/22 0757   Pulse 68 08/16/22 0757   Resp 20 08/16/22 0854   SpO2 98 % 08/16/22 0757         Event Time   Out of Recovery 17:47:00         Pain/Thomas Score: Pain Rating Prior to Med Admin: 7 (8/16/2022  8:54 AM)  Pain Rating Post Med Admin: 2 (8/16/2022  6:43 AM)  Thomas Score: 9 (8/15/2022  5:21 PM)

## 2022-08-16 NOTE — PLAN OF CARE
[facility]  Discharge Final Note    Patient clear to discharge from case management stand point. Follow up appointments listed on avs. Pt's spouse will help her home at discharge.     Primary Care Provider: Najma Rangel MD    Expected Discharge Date: 8/16/2022    Final Discharge Note (most recent)     Final Note - 08/16/22 1500        Final Note    Assessment Type Final Discharge Note     Anticipated Discharge Disposition Home-Health Care [facility]    Hospital Resources/Appts/Education Provided Provided patient/caregiver with written discharge plan information;Appointments scheduled and added to AVS        Post-Acute Status    Post-Acute Authorization Home Health     Home Health Status Set-up Complete/Auth obtained     Discharge Delays None known at this time                 Important Message from Medicare             Contact Info     Jass Willis MD   Specialty: Orthopedic Surgery    [address] [facility] [address]  [place] [address]   Phone: [phone]       Next Steps: Go in 2 week(s)    Instructions: Post op follow up appointment @9:00am    [facility], [facility]    [address] [facility] [address]   Phone: [phone]       Next Steps: Follow up    Instructions: Home Health    [facility]   Specialty: DME Provider    [address]  [address]  [place] [address]   Phone: [phone]       Next Steps: Follow up    Instructions: HANNA CONDE and [facility], DME

## 2022-08-16 NOTE — PLAN OF CARE
Problem: Adult Inpatient Plan of Care  Goal: Plan of Care Review  Outcome: Ongoing, Progressing  Goal: Absence of Hospital-Acquired Illness or Injury  Outcome: Ongoing, Progressing  Goal: Optimal Comfort and Wellbeing  Outcome: Ongoing, Progressing     Problem: Bariatric Environmental Safety  Goal: Safety Maintained with Care  Outcome: Ongoing, Progressing     Problem: Pain Acute  Goal: Acceptable Pain Control and Functional Ability  Outcome: Ongoing, Progressing     Problem: Fall Injury Risk  Goal: Absence of Fall and Fall-Related Injury  Outcome: Ongoing, Progressing

## 2022-08-16 NOTE — PLAN OF CARE
Problem: Physical Therapy  Goal: Physical Therapy Goal  Description: Goals to be met by: 22     Patient will increase functional independence with mobility by performin. Pt to be supervision with bed mobility.  2. Pt to transfer with SBA.  3. Pt to ambulate 50' w/RW SBA.  4. Pt to be (I) with HEP.    Outcome: Ongoing, Progressing   Initial eval completed.  See in chart for details.

## 2022-08-16 NOTE — PROGRESS NOTES
Ortho Daily Progress Note    Apoorva Fernandez is a 67 y.o. female admitted on 8/15/2022      Chief Complaint/Reason for admission: No chief complaint on file.       Hospital Day: 0  Post Op Day: 1 Day Post-Op     The patient was seen and examined this morning at the bedside. Patient reports no acute issues overnight.  Patient reports that pain is adequately controlled.    _______________    Vitals:    08/16/22 0757 08/16/22 0854 08/16/22 1115 08/16/22 1348   BP: (!) 126/56  (!) 147/63    Pulse: 68  68    Resp: 18 20 19 20   Temp: 97.6 °F (36.4 °C)  97.9 °F (36.6 °C)    TempSrc: Oral  Oral    SpO2: 98%  96%    Weight:           Vital Signs (Most Recent)  Temp: 97.9 °F (36.6 °C) (08/16/22 1115)  Pulse: 68 (08/16/22 1115)  Resp: 20 (08/16/22 1348)  BP: (!) 147/63 (08/16/22 1115)  SpO2: 96 % (08/16/22 1115)    Vital Signs Range (Last 24H):  Temp:  [97.3 °F (36.3 °C)-98 °F (36.7 °C)]   Pulse:  [56-77]   Resp:  [13-20]   BP: (121-157)/(56-86)   SpO2:  [95 %-100 %]       Physical:    AAOx3  Incision/ dressing clean/dry/intact  NVI Distally  Palpable distal pulses  CR<3sec      Recent Labs     08/15/22  1554 08/16/22  0354   K 4.3 4.4   CALCIUM 8.5* 8.4*   HGB 12.0 12.0   HCT 35.9* 35.7*       I/O last 3 completed shifts:  In: 1600 [I.V.:1200; IV Piggyback:400]  Out: 200 [Blood:200]          Assessment:  A/P POD 1 s/p right     TKA          Plan:    PT/OT: WBAt  Pain Control  DVT Prophylaxis: ASA    Discharge Plan: home today with [facility]  F/U 2 weeks        Jass Willis MD  [facility]

## 2022-08-16 NOTE — PLAN OF CARE
Problem: Occupational Therapy  Goal: Occupational Therapy Goal  Description:   Goals to be met by: 8/18/22    Patient will increase functional independence with ADLs by performing:    UE Dressing with Modified [place].  LE Dressing with Supervision and Assistive Devices as needed.  Grooming while standing at sink with Modified [place] and Assistive Devices as needed.  Toileting from toilet with Modified [place] for hygiene and clothing management.   Supine to sit with Modified [place].  Step transfer with Modified [place]  Toilet transfer to toilet with Modified [place].    Outcome: Ongoing, Progressing   The patient will benefit from HH OT, RW, BSC and a tub transfer bench.

## 2022-08-16 NOTE — PT/OT/SLP EVAL
"Physical Therapy Evaluation    Patient Name:  Apoorva Fernandez   MRN:  [id number]    Recommendations:     Discharge Recommendations:  home health PT   Discharge Equipment Recommendations: bedside commode, walker, rolling       Assessment:     Apoorva Fernandez is a 67 y.o. female admitted with a medical diagnosis of Primary osteoarthritis of right knee.  She presents with the following impairments/functional limitations:  impaired functional mobility, gait instability, decreased lower extremity function, pain .    Rehab Prognosis: Good; patient would benefit from acute skilled PT services to address these deficits and reach maximum level of function.    Recent Surgery: Procedure(s) (LRB):  ARTHROPLASTY, KNEE, TOTAL- JOAN (Right) 1 Day Post-Op    Plan:     During this hospitalization, patient to be seen BID to address the identified rehab impairments via gait training, therapeutic activities, therapeutic exercises and progress toward the following goals:    · Plan of Care Expires:  08/19/22    Subjective     Chief Complaint: "I don't know if I could stand on it..."  Patient/Family Comments/goals: Pt agreeable to PT eval and treatment.  Pain/Comfort:  · Pain Rating 1: 7/10  · Location - Side 1: Right  · Location 1: knee  · Pain Addressed 1: Reposition, Pre-medicate for activity    Patients cultural, spiritual, Holiness conflicts given the current situation: no    Living Environment:  PTA pt lived with her  in a 2 story house, 3 steps to enter fron, no HR and no steps to enter rear.  Prior to admission, patients level of function was independent.  Equipment used at home: none.  DME owned (not currently used): none.  Upon discharge, patient will have assistance from .    Objective:     Communicated with nurseKarine prior to session.  Patient found supine with peripheral IV  upon PT entry to room.    General Precautions: Standard, fall   Orthopedic Precautions:RLE weight bearing as tolerated "   Respiratory Status: Room air    Exams:  · RLE ROM: WFL except knee, decreased flexion  · RLE Strength: WFL except knee; grossly 2+/5  · LLE ROM: WFL  · LLE Strength: WFL    Functional Mobility:  · Bed Mobility:     · Supine to Sit: minimum assistance  · Transfers:     · Sit to Stand:  contact guard assistance with rolling walker  · Gait: 40' w/RW CGA    Therapeutic Activities and Exercises:   Pt educated on role of PT and POC, instructed on/performed 1x10 reps BLE APs, QS; RLE HS, SLR, hip abd/add, and SAQ.  Ice machine refilled and ready for attachment /p OT session.    AM-PAC 6 CLICK MOBILITY  Total Score:20     Patient left reclined in chair with  and OT present and all needs in reach.    GOALS:   Multidisciplinary Problems     Physical Therapy Goals        Problem: Physical Therapy    Goal Priority Disciplines Outcome Goal Variances Interventions   Physical Therapy Goal     PT, PT/OT Ongoing, Progressing     Description: Goals to be met by: 22     Patient will increase functional independence with mobility by performin. Pt to be supervision with bed mobility.  2. Pt to transfer with SBA.  3. Pt to ambulate 50' w/RW SBA.  4. Pt to be (I) with HEP.                     History:     Past Medical History:   Diagnosis Date    Allergic rhinitis     Allergy     Arthritis     Hyperlipidemia     Diet-controlled    Hypertension     Joint pain     Keloid cicatrix     Obesity     Osteoarthritis     Personal history of colonic polyps 2010    Adenomatous    Personal history of peptic ulcer disease     PONV (postoperative nausea and vomiting)     Sleep apnea     Umbilical hernia        Past Surgical History:   Procedure Laterality Date    Abdominal/umbilical hernia repair      removed small part of small bowel; mesh placed     SECTION, LOW TRANSVERSE      X2    CHOLECYSTECTOMY      COLONOSCOPY N/A 3/26/2019    Procedure: COLONOSCOPY;  Surgeon: Chandrika Ho MD;   [facility];  Service: Endoscopy;  Laterality: N/A;    Open laparoscopy for a ruptured ulcer         Time Tracking:     PT Received On: 08/16/22  PT Start Time: 0914     PT Stop Time: 0947  PT Total Time (min): 33 min     Billable Minutes: Evaluation 15 and Gait Training 18      08/16/2022

## 2022-08-16 NOTE — PLAN OF CARE
Patient from home and lives with spouse, who will be her help at home. Pt has no preferred home health. Pt may benefit from bsc and rw upon discharge.    Ortho follow up appointment scheduled and listed on avs. Home health referral sent to [facility] via care port, pending review. [place] to continue to follow for discharge needs.     2:35pm Per Karla with [facility], patient accepted for hh.   08/16/22 2931   Discharge Planning   Assessment Type Discharge Planning Brief Assessment   Support Systems Spouse/significant other   Equipment Currently Used at Home none   Current Living Arrangements home/apartment/condo   Patient/Family Anticipates Transition to home with family   Patient/Family Anticipated Services at Transition durable medical equipment   DME Needed Upon Discharge  bedside commode;walker, rolling   Discharge Plan A Home Health   Discharge Plan B Home with family

## 2022-08-26 NOTE — PROGRESS NOTES
I have reviewed the notes, assessments, and/or procedures performed this visit and physically examined the patient and I concur with the assessment and plan as documented by Dr. Piyush Mcgee for patient Apoorva Fernandez who was evaluated in resident clinic for a preop eval.  Will proceed with ordering an Echo, EKG, BNP and initiation of diuretic therapy and cardiology clearance.

## 2022-09-30 ENCOUNTER — PATIENT MESSAGE (OUTPATIENT)
Dept: FAMILY MEDICINE | Facility: CLINIC | Age: 67
End: 2022-09-30
Payer: COMMERCIAL

## 2022-10-21 ENCOUNTER — PATIENT MESSAGE (OUTPATIENT)
Dept: FAMILY MEDICINE | Facility: CLINIC | Age: 67
End: 2022-10-21
Payer: COMMERCIAL

## 2022-10-21 DIAGNOSIS — E78.49 OTHER HYPERLIPIDEMIA: Primary | ICD-10-CM

## 2022-10-23 ENCOUNTER — PATIENT MESSAGE (OUTPATIENT)
Dept: FAMILY MEDICINE | Facility: CLINIC | Age: 67
End: 2022-10-23
Payer: COMMERCIAL

## 2022-10-24 NOTE — TELEPHONE ENCOUNTER
Call placed to patient to inform/notify that mammogram is due Dec 2022. Patient scheduled for lab prior to OV. Verbalized understanding and thank you.

## 2022-10-25 ENCOUNTER — IMMUNIZATION (OUTPATIENT)
Dept: OBSTETRICS AND GYNECOLOGY | Facility: CLINIC | Age: 67
End: 2022-10-25
Payer: COMMERCIAL

## 2022-10-25 DIAGNOSIS — Z23 NEED FOR VACCINATION: Primary | ICD-10-CM

## 2022-10-25 PROCEDURE — 91312 COVID-19, MRNA, LNP-S, BIVALENT BOOSTER, PF, 30 MCG/0.3 ML DOSE: CPT | Mod: S$GLB,,, | Performed by: FAMILY MEDICINE

## 2022-10-25 PROCEDURE — 91312 COVID-19, MRNA, LNP-S, BIVALENT BOOSTER, PF, 30 MCG/0.3 ML DOSE: ICD-10-PCS | Mod: S$GLB,,, | Performed by: FAMILY MEDICINE

## 2022-10-25 PROCEDURE — 0124A COVID-19, MRNA, LNP-S, BIVALENT BOOSTER, PF, 30 MCG/0.3 ML DOSE: CPT | Mod: PBBFAC | Performed by: FAMILY MEDICINE

## 2022-11-03 ENCOUNTER — LAB VISIT (OUTPATIENT)
Dept: LAB | Facility: HOSPITAL | Age: 67
End: 2022-11-03
Attending: INTERNAL MEDICINE
Payer: COMMERCIAL

## 2022-11-03 DIAGNOSIS — E78.49 OTHER HYPERLIPIDEMIA: ICD-10-CM

## 2022-11-03 LAB
CHOLEST SERPL-MCNC: 206 MG/DL (ref 120–199)
CHOLEST/HDLC SERPL: 3.6 {RATIO} (ref 2–5)
HDLC SERPL-MCNC: 57 MG/DL (ref 40–75)
HDLC SERPL: 27.7 % (ref 20–50)
LDLC SERPL CALC-MCNC: 124.6 MG/DL (ref 63–159)
NONHDLC SERPL-MCNC: 149 MG/DL
TRIGL SERPL-MCNC: 122 MG/DL (ref 30–150)

## 2022-11-03 PROCEDURE — 80061 LIPID PANEL: CPT | Performed by: INTERNAL MEDICINE

## 2022-11-03 PROCEDURE — 36415 COLL VENOUS BLD VENIPUNCTURE: CPT | Mod: PO | Performed by: INTERNAL MEDICINE

## 2022-11-09 ENCOUNTER — OFFICE VISIT (OUTPATIENT)
Dept: FAMILY MEDICINE | Facility: CLINIC | Age: 67
End: 2022-11-09
Payer: COMMERCIAL

## 2022-11-09 VITALS
BODY MASS INDEX: 44.35 KG/M2 | HEART RATE: 74 BPM | DIASTOLIC BLOOD PRESSURE: 78 MMHG | TEMPERATURE: 98 F | SYSTOLIC BLOOD PRESSURE: 132 MMHG | WEIGHT: 225.88 LBS | OXYGEN SATURATION: 98 % | HEIGHT: 60 IN

## 2022-11-09 DIAGNOSIS — M06.00 SERONEGATIVE RHEUMATOID ARTHRITIS: ICD-10-CM

## 2022-11-09 DIAGNOSIS — Z00.00 ROUTINE MEDICAL EXAM: Primary | ICD-10-CM

## 2022-11-09 DIAGNOSIS — J30.9 ALLERGIC RHINITIS, UNSPECIFIED SEASONALITY, UNSPECIFIED TRIGGER: ICD-10-CM

## 2022-11-09 DIAGNOSIS — G72.0 STATIN MYOPATHY: ICD-10-CM

## 2022-11-09 DIAGNOSIS — I10 ESSENTIAL HYPERTENSION: ICD-10-CM

## 2022-11-09 DIAGNOSIS — Z12.31 SCREENING MAMMOGRAM, ENCOUNTER FOR: ICD-10-CM

## 2022-11-09 DIAGNOSIS — E78.49 OTHER HYPERLIPIDEMIA: ICD-10-CM

## 2022-11-09 DIAGNOSIS — T46.6X5A STATIN MYOPATHY: ICD-10-CM

## 2022-11-09 DIAGNOSIS — E66.01 SEVERE OBESITY (BMI 35.0-39.9) WITH COMORBIDITY: ICD-10-CM

## 2022-11-09 DIAGNOSIS — G47.33 OSA (OBSTRUCTIVE SLEEP APNEA): ICD-10-CM

## 2022-11-09 PROCEDURE — 99999 PR PBB SHADOW E&M-EST. PATIENT-LVL IV: ICD-10-PCS | Mod: PBBFAC,,, | Performed by: INTERNAL MEDICINE

## 2022-11-09 PROCEDURE — 1126F AMNT PAIN NOTED NONE PRSNT: CPT | Mod: CPTII,S$GLB,, | Performed by: INTERNAL MEDICINE

## 2022-11-09 PROCEDURE — 1160F PR REVIEW ALL MEDS BY PRESCRIBER/CLIN PHARMACIST DOCUMENTED: ICD-10-PCS | Mod: CPTII,S$GLB,, | Performed by: INTERNAL MEDICINE

## 2022-11-09 PROCEDURE — 3075F SYST BP GE 130 - 139MM HG: CPT | Mod: CPTII,S$GLB,, | Performed by: INTERNAL MEDICINE

## 2022-11-09 PROCEDURE — 1126F PR PAIN SEVERITY QUANTIFIED, NO PAIN PRESENT: ICD-10-PCS | Mod: CPTII,S$GLB,, | Performed by: INTERNAL MEDICINE

## 2022-11-09 PROCEDURE — 1160F RVW MEDS BY RX/DR IN RCRD: CPT | Mod: CPTII,S$GLB,, | Performed by: INTERNAL MEDICINE

## 2022-11-09 PROCEDURE — 1101F PR PT FALLS ASSESS DOC 0-1 FALLS W/OUT INJ PAST YR: ICD-10-PCS | Mod: CPTII,S$GLB,, | Performed by: INTERNAL MEDICINE

## 2022-11-09 PROCEDURE — 1159F MED LIST DOCD IN RCRD: CPT | Mod: CPTII,S$GLB,, | Performed by: INTERNAL MEDICINE

## 2022-11-09 PROCEDURE — 3044F PR MOST RECENT HEMOGLOBIN A1C LEVEL <7.0%: ICD-10-PCS | Mod: CPTII,S$GLB,, | Performed by: INTERNAL MEDICINE

## 2022-11-09 PROCEDURE — 1101F PT FALLS ASSESS-DOCD LE1/YR: CPT | Mod: CPTII,S$GLB,, | Performed by: INTERNAL MEDICINE

## 2022-11-09 PROCEDURE — 1159F PR MEDICATION LIST DOCUMENTED IN MEDICAL RECORD: ICD-10-PCS | Mod: CPTII,S$GLB,, | Performed by: INTERNAL MEDICINE

## 2022-11-09 PROCEDURE — 3288F PR FALLS RISK ASSESSMENT DOCUMENTED: ICD-10-PCS | Mod: CPTII,S$GLB,, | Performed by: INTERNAL MEDICINE

## 2022-11-09 PROCEDURE — 99397 PER PM REEVAL EST PAT 65+ YR: CPT | Mod: S$GLB,,, | Performed by: INTERNAL MEDICINE

## 2022-11-09 PROCEDURE — 99397 PR PREVENTIVE VISIT,EST,65 & OVER: ICD-10-PCS | Mod: S$GLB,,, | Performed by: INTERNAL MEDICINE

## 2022-11-09 PROCEDURE — 3078F PR MOST RECENT DIASTOLIC BLOOD PRESSURE < 80 MM HG: ICD-10-PCS | Mod: CPTII,S$GLB,, | Performed by: INTERNAL MEDICINE

## 2022-11-09 PROCEDURE — 3008F PR BODY MASS INDEX (BMI) DOCUMENTED: ICD-10-PCS | Mod: CPTII,S$GLB,, | Performed by: INTERNAL MEDICINE

## 2022-11-09 PROCEDURE — 3078F DIAST BP <80 MM HG: CPT | Mod: CPTII,S$GLB,, | Performed by: INTERNAL MEDICINE

## 2022-11-09 PROCEDURE — 3288F FALL RISK ASSESSMENT DOCD: CPT | Mod: CPTII,S$GLB,, | Performed by: INTERNAL MEDICINE

## 2022-11-09 PROCEDURE — 3075F PR MOST RECENT SYSTOLIC BLOOD PRESS GE 130-139MM HG: ICD-10-PCS | Mod: CPTII,S$GLB,, | Performed by: INTERNAL MEDICINE

## 2022-11-09 PROCEDURE — 3044F HG A1C LEVEL LT 7.0%: CPT | Mod: CPTII,S$GLB,, | Performed by: INTERNAL MEDICINE

## 2022-11-09 PROCEDURE — 3008F BODY MASS INDEX DOCD: CPT | Mod: CPTII,S$GLB,, | Performed by: INTERNAL MEDICINE

## 2022-11-09 PROCEDURE — 99999 PR PBB SHADOW E&M-EST. PATIENT-LVL IV: CPT | Mod: PBBFAC,,, | Performed by: INTERNAL MEDICINE

## 2022-11-09 RX ORDER — FLUTICASONE PROPIONATE 50 MCG
SPRAY, SUSPENSION (ML) NASAL
Qty: 48 G | Refills: 3 | Status: SHIPPED | OUTPATIENT
Start: 2022-11-09 | End: 2023-01-31 | Stop reason: SDUPTHER

## 2022-11-09 RX ORDER — ONDANSETRON 4 MG/1
4 TABLET, FILM COATED ORAL EVERY 8 HOURS PRN
COMMUNITY
Start: 2022-08-19 | End: 2024-01-20

## 2022-11-09 RX ORDER — SCOLOPAMINE TRANSDERMAL SYSTEM 1 MG/1
1 PATCH, EXTENDED RELEASE TRANSDERMAL
Qty: 10 PATCH | Refills: 0 | Status: SHIPPED | OUTPATIENT
Start: 2022-11-09 | End: 2022-11-19

## 2022-11-09 RX ORDER — TRIAMTERENE AND HYDROCHLOROTHIAZIDE 37.5; 25 MG/1; MG/1
1 CAPSULE ORAL EVERY MORNING
Qty: 90 CAPSULE | Refills: 2 | Status: SHIPPED | OUTPATIENT
Start: 2022-11-09 | End: 2023-07-31

## 2022-11-09 NOTE — PROGRESS NOTES
HISTORY OF PRESENT ILLNESS:  Apoorva Fernandez is a 67 y.o. female who presents to the clinic today for a routine physical exam. Her last physical exam was lw pe time; ago: approximately 1 years(s) ago.      Objective    PAST MEDICAL HISTORY:  Past Medical History:   Diagnosis Date    Allergic rhinitis     Allergy     Arthritis     Hyperlipidemia     Diet-controlled    Hypertension     Joint pain     Keloid cicatrix     Obesity     Osteoarthritis     Personal history of colonic polyps 2010    Adenomatous    Personal history of peptic ulcer disease     PONV (postoperative nausea and vomiting)     Sleep apnea     Umbilical hernia        PAST SURGICAL HISTORY:  Past Surgical History:   Procedure Laterality Date    Abdominal/umbilical hernia repair      removed small part of small bowel; mesh placed     SECTION, LOW TRANSVERSE      X2    CHOLECYSTECTOMY      COLONOSCOPY N/A 3/26/2019    Procedure: COLONOSCOPY;  Surgeon: Chandrika Ho MD;  Location: [facility] ENDO;  Service: Endoscopy;  Laterality: N/A;    Open laparoscopy for a ruptured ulcer      TOTAL KNEE ARTHROPLASTY Right 8/15/2022    Procedure: ARTHROPLASTY, KNEE, TOTAL- JOAN;  Surgeon: Jass Willis MD;  Location: [facility] OR;  Service: Orthopedics;  Laterality: Right;  NAE SALGADO [phone] TEXTED HIM @ 7:06AM  ON 27:06AM ON 2022  1ST BALWINDER DAVIS [phone] TEXTED HER @ 7:16AM ON 2022 REPLIED @ 7:18AM ON 2022  NOON START  SUPINE  RN PREOP 2022    T/S ON 2022       HAS MEDICAL TESHA       SOCIAL HISTORY:  Social History     Socioeconomic History    Marital status:     Number of children: 2   Occupational History    Occupation:    Tobacco Use    Smoking status: Never    Smokeless tobacco: Never   Substance and Sexual Activity    Alcohol use: Yes     Alcohol/week: 2.0 - 3.0 standard drinks     Types: 2 - 3 Glasses of wine per week     Comment: Socially on the weekends     Drug use: No    Sexual activity: Yes     Partners: Male     Social Determinants of Health     Financial Resource Strain: Low Risk     Difficulty of Paying Living Expenses: Not hard at all   Food Insecurity: No Food Insecurity    Worried About Running Out of Food in the Last Year: Never true    Ran Out of Food in the Last Year: Never true   Transportation Needs: No Transportation Needs    Lack of Transportation (Medical): No    Lack of Transportation (Non-Medical): No   Physical Activity: Insufficiently Active    Days of Exercise per Week: 3 days    Minutes of Exercise per Session: 30 min   Stress: No Stress Concern Present    Feeling of Stress : Not at all   Social Connections: Unknown    Frequency of Communication with Friends and Family: More than three times a week    Frequency of Social Gatherings with Friends and Family: More than three times a week    Active Member of Clubs or Organizations: Yes    Attends Club or Organization Meetings: More than 4 times per year    Marital Status:    Housing Stability: Low Risk     Unable to Pay for Housing in the Last Year: No    Number of Places Lived in the Last Year: 1    Unstable Housing in the Last Year: No       FAMILY HISTORY:  Family History   Problem Relation Age of Onset    Breast cancer Mother          at age 65    Cancer Father         Bladder    Glaucoma Father     Hypertension Father     Heart disease Father     Obesity Sister     Heart disease Brother     Sleep apnea Brother     Diabetes Neg Hx     Melanoma Neg Hx        ALLERGIES AND MEDICATIONS: updated and reviewed.  Review of patient's allergies indicates:   Allergen Reactions    Antihistamines - alkylamine Shortness Of Breath    Atorvastatin      Other reaction(s): myalgia  Other reaction(s): myalgia    Ezetimibe      Other reaction(s): muscle cramps  Other reaction(s): muscle cramps     Medication List with Changes/Refills   New Medications    SCOPOLAMINE (TRANSDERM-SCOP) 1.3-1.5 MG (1 MG  OVER 3 DAYS)    Place 1 patch onto the skin Every 3 (three) days. Apply behind ear 30 min prior to event. Replace q3 days. for 10 days   Current Medications    ASPIRIN (ECOTRIN) 81 MG EC TABLET    Take 1 tablet (81 mg total) by mouth 2 (two) times a day.    DOCUSATE SODIUM (COLACE) 100 MG CAPSULE    Take 1 capsule (100 mg total) by mouth 2 (two) times daily as needed for Constipation.    FAMOTIDINE (PEPCID) 20 MG TABLET    Take 20 mg by mouth 2 (two) times daily.    LIDOCAINE (LIDODERM) 5 %    Place 1 patch onto the skin every 24 hours. Remove & Discard patch within 12 hours or as directed by MD    OMEGA 3-DHA-EPA-FISH OIL 1,000 (120-180) MG CAP    Take 1 capsule by mouth once daily.    ONDANSETRON (ZOFRAN) 4 MG TABLET    Take 4 mg by mouth every 8 (eight) hours as needed.   Changed and/or Refilled Medications    Modified Medication Previous Medication    FLUTICASONE PROPIONATE (FLONASE) 50 MCG/ACTUATION NASAL SPRAY fluticasone propionate (FLONASE) 50 mcg/actuation nasal spray       SPRAY 2 SPRAYs INTO EACH NOSTRIL ONCE EVERY DAY    SPRAY 2 SPRAYs INTO EACH NOSTRIL ONCE EVERY DAY    TRIAMTERENE-HYDROCHLOROTHIAZIDE 37.5-25 MG (DYAZIDE) 37.5-25 MG PER CAPSULE triamterene-hydrochlorothiazide 37.5-25 mg (DYAZIDE) 37.5-25 mg per capsule       Take 1 capsule by mouth every morning.    Take 1 capsule by mouth every morning.   Discontinued Medications    ESOMEPRAZOLE (NEXIUM) 40 MG CAPSULE    Take 1 capsule (40 mg total) by mouth daily before breakfast.    OXYCODONE-ACETAMINOPHEN (PERCOCET) 5-325 MG PER TABLET    Take 1 tablet by mouth every eight hours as needed for pain    PROMETHAZINE (PHENERGAN) 25 MG TABLET    Take 1 tablet (25 mg total) by mouth every 6 (six) hours as needed for Nausea.          CARE TEAM:  Patient Care Team:  Najma Rangel MD as PCP - General (Internal Medicine)             SCREENING HISTORY:  Health Maintenance         Date Due Completion Date    TETANUS VACCINE 07/15/2018 7/15/2008    Mammogram  12/10/2022 12/10/2021    Colorectal Cancer Screening 03/26/2024 3/26/2019    Override on 8/3/2010: Done    Lipid Panel 11/03/2027 11/3/2022    DEXA Scan 01/14/2030 1/14/2020              REVIEW OF SYSTEMS:   The patient reports: they are improving their dietary habits .  The patient reports  : that they do not exercise regularly, but stay active. She is able to be more active since recent right TKR.  Review of Systems   Constitutional:  Negative for activity change and unexpected weight change.   HENT:  Positive for ear pain (left ear popping - ETD - uses flonase). Negative for hearing loss, rhinorrhea and trouble swallowing.    Eyes:  Negative for discharge and visual disturbance.   Respiratory:  Negative for chest tightness and wheezing.    Cardiovascular:  Negative for chest pain and palpitations.   Gastrointestinal:  Negative for blood in stool, constipation, diarrhea and vomiting.   Endocrine: Negative for polydipsia and polyuria.   Genitourinary:  Negative for difficulty urinating, dysuria, hematuria and menstrual problem.   Musculoskeletal:  Positive for arthralgias. Negative for joint swelling and neck pain.   Neurological:  Negative for weakness and headaches.   Psychiatric/Behavioral:  Negative for confusion and dysphoric mood.     ROS (Optional)-: no pelvic pain  Breast ROS (Optional)-: negative for breast lumps/discharge          Physical Examination: 274}  Vitals:    11/09/22 1410   BP: 132/78   Pulse: 74   Temp: 97.8 °F (36.6 °C)     Weight: 102.4 kg (225 lb 13.8 oz)   Height: 5' (152.4 cm)   Body mass index is 44.11 kg/m².      Patient did not require to have a chaperone present during the exam today.    General appearance - alert, well appearing, and in no distress, morbidly obese  Psychiatric - alert, oriented to person, place, and time, normal behavior, speech, dress, motor activity and thought process  Eyes - pupils equal and reactive, extraocular eye movements intact, sclera anicteric  Mouth -  not examined  Neck - supple, no significant adenopathy, carotids upstroke normal bilaterally, no bruits  Lymphatics - no palpable cervical lymphadenopathy  Chest - clear to auscultation, no wheezes, rales or rhonchi, symmetric air entry  Heart - normal rate and regular rhythm  Neurological - alert, normal speech, no focal findings; cranial nerves II through XII intact  Musculoskeletal - patient noted to have Moderate osteoarthritic changes to both knee joints. No joint effusions noted., patient noted to have scar from previous right knee replacement..  Extremities - no pedal edema noted  Skin - normal coloration, no suspicious skin lesions      Labs:  Results for orders placed or performed in visit on 11/03/22   Lipid Panel   Result Value Ref Range    Cholesterol 206 (H) 120 - 199 mg/dL    Triglycerides 122 30 - 150 mg/dL    HDL 57 40 - 75 mg/dL    LDL Cholesterol 124.6 63.0 - 159.0 mg/dL    HDL/Cholesterol Ratio 27.7 20.0 - 50.0 %    Total Cholesterol/HDL Ratio 3.6 2.0 - 5.0    Non-HDL Cholesterol 149 mg/dL          ASSESSMENT AND PLAN:  274}  1. Routine medical exam  Counseled on age appropriate medical preventative services including age appropriate cancer screenings, age appropriate eye and dental exams, over all nutritional health, need for a consistent exercise regimen, and an over all push towards maintaining a vigorous and active lifestyle.  Counseled on age appropriate vaccines and discussed upcoming health care needs based on age/gender. Discussed good sleep hygiene and stress management.      2. Essential hypertension  BP Readings from Last 1 Encounters:   11/09/22 132/78      The current medical regimen is effective;  continue present plan and medications. Recommended patient to check home readings to monitor and see me for followup as scheduled or sooner as needed.   Discussed sodium restriction, maintaining ideal body weight and regular exercise program as physiologic means to continue to achieve blood  pressure control in addition to medication compliance.  Patient was educated that both decongestant and anti-inflammatory medication may raise blood pressure.  The patient is not active on the digital hypertension program.    -     triamterene-hydrochlorothiazide 37.5-25 mg (DYAZIDE) 37.5-25 mg per capsule; Take 1 capsule by mouth every morning.  Dispense: 90 capsule; Refill: 2    3. Other hyperlipidemia/4. Statin myopathy  Lab Results   Component Value Date    CHOL 206 (H) 11/03/2022     Lab Results   Component Value Date    HDL 57 11/03/2022     Lab Results   Component Value Date    LDLCALC 124.6 11/03/2022     Lab Results   Component Value Date    TRIG 122 11/03/2022     Lab Results   Component Value Date    LDLCALC 124.6 11/03/2022     We discussed low fat diet and regular exercise. Patient is statin intolerant.    Overview:  Diet-controlled  - could not tolerate lipitor and Zetia        5. Seronegative rheumatoid arthritis  Feeling well overall. Less arthralgia since starting dyazide. Observe.    6. BELLA (obstructive sleep apnea)  Defer to sleep medicine.  Overview:  Pt will need new home sleep study in order to qualify for cpap again. Pt states can't afford at moment. She will contact us back when she has funding. In the meantime, she is working on diet, exercise and weight loss.         7. Severe obesity (BMI 35.0-39.9) with comorbidity  BMI Readings from Last 3 Encounters:   11/09/22 44.11 kg/m²   08/11/22 46.15 kg/m²   08/01/22 46.16 kg/m²     The patient is asked to continue to make an attempt to improve diet and exercise patterns to aid in medical management of this problem.       8. Screening mammogram, encounter for    -     Mammo Digital Screening Bilat w/ Abundio; Future; Expected date: 11/09/2022    9. Allergic rhinitis  The current medical regimen is effective;  continue present plan and medications.     -     fluticasone propionate (FLONASE) 50 mcg/actuation nasal spray; SPRAY 2 SPRAYs INTO EACH  NOSTRIL ONCE EVERY DAY  Dispense: 48 g; Refill: 3    Other orders  -     scopolamine (TRANSDERM-SCOP) 1.3-1.5 mg (1 mg over 3 days); Place 1 patch onto the skin Every 3 (three) days. Apply behind ear 30 min prior to event. Replace q3 days. for 10 days  Dispense: 10 patch; Refill: 0     Recommend to continue flonase for left ear pain due to ETD. Can also add saline nasal rinse. Can use afrin if flying. Cannot tolerate anti-histamine.        Orders Placed This Encounter   Procedures    Mammo Digital Screening Bilat w/ Abundio      Follow up in about 1 year (around 11/9/2023), or if symptoms worsen or fail to improve, for annual exam. or sooner as needed.

## 2022-11-18 ENCOUNTER — OFFICE VISIT (OUTPATIENT)
Dept: URGENT CARE | Facility: CLINIC | Age: 67
End: 2022-11-18
Payer: COMMERCIAL

## 2022-11-18 VITALS
RESPIRATION RATE: 14 BRPM | SYSTOLIC BLOOD PRESSURE: 144 MMHG | WEIGHT: 225 LBS | HEART RATE: 63 BPM | HEIGHT: 60 IN | TEMPERATURE: 97 F | OXYGEN SATURATION: 97 % | BODY MASS INDEX: 44.17 KG/M2 | DIASTOLIC BLOOD PRESSURE: 91 MMHG

## 2022-11-18 DIAGNOSIS — J06.9 VIRAL URI WITH COUGH: Primary | ICD-10-CM

## 2022-11-18 LAB
CTP QC/QA: YES
CTP QC/QA: YES
POC MOLECULAR INFLUENZA A AGN: NEGATIVE
POC MOLECULAR INFLUENZA B AGN: NEGATIVE
SARS-COV-2 RDRP RESP QL NAA+PROBE: NEGATIVE

## 2022-11-18 PROCEDURE — 1125F AMNT PAIN NOTED PAIN PRSNT: CPT | Mod: CPTII,S$GLB,, | Performed by: NURSE PRACTITIONER

## 2022-11-18 PROCEDURE — 87502 POCT INFLUENZA A/B MOLECULAR: ICD-10-PCS | Mod: QW,S$GLB,, | Performed by: NURSE PRACTITIONER

## 2022-11-18 PROCEDURE — 1159F PR MEDICATION LIST DOCUMENTED IN MEDICAL RECORD: ICD-10-PCS | Mod: CPTII,S$GLB,, | Performed by: NURSE PRACTITIONER

## 2022-11-18 PROCEDURE — 3044F HG A1C LEVEL LT 7.0%: CPT | Mod: CPTII,S$GLB,, | Performed by: NURSE PRACTITIONER

## 2022-11-18 PROCEDURE — 1125F PR PAIN SEVERITY QUANTIFIED, PAIN PRESENT: ICD-10-PCS | Mod: CPTII,S$GLB,, | Performed by: NURSE PRACTITIONER

## 2022-11-18 PROCEDURE — 3077F SYST BP >= 140 MM HG: CPT | Mod: CPTII,S$GLB,, | Performed by: NURSE PRACTITIONER

## 2022-11-18 PROCEDURE — 3080F DIAST BP >= 90 MM HG: CPT | Mod: CPTII,S$GLB,, | Performed by: NURSE PRACTITIONER

## 2022-11-18 PROCEDURE — 3080F PR MOST RECENT DIASTOLIC BLOOD PRESSURE >= 90 MM HG: ICD-10-PCS | Mod: CPTII,S$GLB,, | Performed by: NURSE PRACTITIONER

## 2022-11-18 PROCEDURE — 3008F BODY MASS INDEX DOCD: CPT | Mod: CPTII,S$GLB,, | Performed by: NURSE PRACTITIONER

## 2022-11-18 PROCEDURE — 87635 SARS-COV-2 COVID-19 AMP PRB: CPT | Mod: QW,S$GLB,, | Performed by: NURSE PRACTITIONER

## 2022-11-18 PROCEDURE — 99213 OFFICE O/P EST LOW 20 MIN: CPT | Mod: S$GLB,,, | Performed by: NURSE PRACTITIONER

## 2022-11-18 PROCEDURE — 99213 PR OFFICE/OUTPT VISIT, EST, LEVL III, 20-29 MIN: ICD-10-PCS | Mod: S$GLB,,, | Performed by: NURSE PRACTITIONER

## 2022-11-18 PROCEDURE — 87635: ICD-10-PCS | Mod: QW,S$GLB,, | Performed by: NURSE PRACTITIONER

## 2022-11-18 PROCEDURE — 1160F PR REVIEW ALL MEDS BY PRESCRIBER/CLIN PHARMACIST DOCUMENTED: ICD-10-PCS | Mod: CPTII,S$GLB,, | Performed by: NURSE PRACTITIONER

## 2022-11-18 PROCEDURE — 1160F RVW MEDS BY RX/DR IN RCRD: CPT | Mod: CPTII,S$GLB,, | Performed by: NURSE PRACTITIONER

## 2022-11-18 PROCEDURE — 3044F PR MOST RECENT HEMOGLOBIN A1C LEVEL <7.0%: ICD-10-PCS | Mod: CPTII,S$GLB,, | Performed by: NURSE PRACTITIONER

## 2022-11-18 PROCEDURE — 3008F PR BODY MASS INDEX (BMI) DOCUMENTED: ICD-10-PCS | Mod: CPTII,S$GLB,, | Performed by: NURSE PRACTITIONER

## 2022-11-18 PROCEDURE — 3077F PR MOST RECENT SYSTOLIC BLOOD PRESSURE >= 140 MM HG: ICD-10-PCS | Mod: CPTII,S$GLB,, | Performed by: NURSE PRACTITIONER

## 2022-11-18 PROCEDURE — 1159F MED LIST DOCD IN RCRD: CPT | Mod: CPTII,S$GLB,, | Performed by: NURSE PRACTITIONER

## 2022-11-18 PROCEDURE — 87502 INFLUENZA DNA AMP PROBE: CPT | Mod: QW,S$GLB,, | Performed by: NURSE PRACTITIONER

## 2022-11-18 RX ORDER — PREDNISONE 20 MG/1
20 TABLET ORAL DAILY
Qty: 3 TABLET | Refills: 0 | Status: SHIPPED | OUTPATIENT
Start: 2022-11-18 | End: 2022-11-21

## 2022-11-18 RX ORDER — BENZONATATE 200 MG/1
200 CAPSULE ORAL 3 TIMES DAILY PRN
Qty: 30 CAPSULE | Refills: 0 | Status: SHIPPED | OUTPATIENT
Start: 2022-11-18 | End: 2022-11-28

## 2022-11-18 NOTE — PROGRESS NOTES
Subjective:       Patient ID: Apoorva Fernandez is a 67 y.o. female.    Vitals:  height is 5' (1.524 m) and weight is 102.1 kg (225 lb). Her oral temperature is 97.4 °F (36.3 °C). Her blood pressure is 144/91 (abnormal) and her pulse is 63. Her respiration is 14 and oxygen saturation is 97%.     Chief Complaint: Generalized Body Aches    Pt is here today for body aches, headache, chest congestion, bilateral ear discomfort and runny nose  X 3 days  Pt is using Flonase  Provider note begins below     and grandchild are both sick with similar symptoms.  Denies fever.    Headache   This is a new problem. The current episode started in the past 7 days. The problem occurs constantly. The problem has been unchanged. Quality: pressure. The pain is at a severity of 5/10. Associated symptoms include ear pain. Pertinent negatives include no fever.     Constitution: Negative for fever.   HENT:  Positive for ear pain.    Neurological:  Positive for headaches.     Objective:      Physical Exam   Constitutional: She is oriented to person, place, and time.   HENT:   Head: Normocephalic and atraumatic.   Cardiovascular: Normal rate.   Pulmonary/Chest: Effort normal. No respiratory distress.   Abdominal: Normal appearance.   Neurological: She is alert and oriented to person, place, and time.   Skin: Skin is warm and dry.   Psychiatric: Her behavior is normal. Mood normal.       Results for orders placed or performed in visit on 11/18/22   POCT Influenza A/B MOLECULAR   Result Value Ref Range    POC Molecular Influenza A Ag Negative Negative, Not Reported    POC Molecular Influenza B Ag Negative Negative, Not Reported     Acceptable Yes         Assessment:       1. Cough, unspecified type          Plan:       Flu test negative   COVID test negative  Tessalon Perles for cough  Follow-up if symptoms worsen or do not improve  May alternate Tylenol with ibuprofen   Hydrate with Gatorade Powerade or Pedialyte    Short course steroids        Cough, unspecified type  -     POCT Influenza A/B MOLECULAR  -     POCT COVID-19 Rapid Screening

## 2022-12-12 ENCOUNTER — OFFICE VISIT (OUTPATIENT)
Dept: URGENT CARE | Facility: CLINIC | Age: 67
End: 2022-12-12
Payer: COMMERCIAL

## 2022-12-12 VITALS
HEART RATE: 75 BPM | WEIGHT: 225 LBS | RESPIRATION RATE: 18 BRPM | BODY MASS INDEX: 44.17 KG/M2 | HEIGHT: 60 IN | DIASTOLIC BLOOD PRESSURE: 76 MMHG | TEMPERATURE: 99 F | SYSTOLIC BLOOD PRESSURE: 128 MMHG | OXYGEN SATURATION: 97 %

## 2022-12-12 DIAGNOSIS — R05.1 ACUTE COUGH: ICD-10-CM

## 2022-12-12 DIAGNOSIS — J98.4 PNEUMONITIS: Primary | ICD-10-CM

## 2022-12-12 PROCEDURE — 1160F PR REVIEW ALL MEDS BY PRESCRIBER/CLIN PHARMACIST DOCUMENTED: ICD-10-PCS | Mod: CPTII,S$GLB,,

## 2022-12-12 PROCEDURE — 1125F PR PAIN SEVERITY QUANTIFIED, PAIN PRESENT: ICD-10-PCS | Mod: CPTII,S$GLB,,

## 2022-12-12 PROCEDURE — 1159F PR MEDICATION LIST DOCUMENTED IN MEDICAL RECORD: ICD-10-PCS | Mod: CPTII,S$GLB,,

## 2022-12-12 PROCEDURE — 3074F PR MOST RECENT SYSTOLIC BLOOD PRESSURE < 130 MM HG: ICD-10-PCS | Mod: CPTII,S$GLB,,

## 2022-12-12 PROCEDURE — 1159F MED LIST DOCD IN RCRD: CPT | Mod: CPTII,S$GLB,,

## 2022-12-12 PROCEDURE — 3008F PR BODY MASS INDEX (BMI) DOCUMENTED: ICD-10-PCS | Mod: CPTII,S$GLB,,

## 2022-12-12 PROCEDURE — 99213 PR OFFICE/OUTPT VISIT, EST, LEVL III, 20-29 MIN: ICD-10-PCS | Mod: S$GLB,,,

## 2022-12-12 PROCEDURE — 3074F SYST BP LT 130 MM HG: CPT | Mod: CPTII,S$GLB,,

## 2022-12-12 PROCEDURE — 3078F DIAST BP <80 MM HG: CPT | Mod: CPTII,S$GLB,,

## 2022-12-12 PROCEDURE — 3044F HG A1C LEVEL LT 7.0%: CPT | Mod: CPTII,S$GLB,,

## 2022-12-12 PROCEDURE — 71046 X-RAY EXAM CHEST 2 VIEWS: CPT | Mod: S$GLB,,, | Performed by: RADIOLOGY

## 2022-12-12 PROCEDURE — 99213 OFFICE O/P EST LOW 20 MIN: CPT | Mod: S$GLB,,,

## 2022-12-12 PROCEDURE — 1125F AMNT PAIN NOTED PAIN PRSNT: CPT | Mod: CPTII,S$GLB,,

## 2022-12-12 PROCEDURE — 1160F RVW MEDS BY RX/DR IN RCRD: CPT | Mod: CPTII,S$GLB,,

## 2022-12-12 PROCEDURE — 3008F BODY MASS INDEX DOCD: CPT | Mod: CPTII,S$GLB,,

## 2022-12-12 PROCEDURE — 3044F PR MOST RECENT HEMOGLOBIN A1C LEVEL <7.0%: ICD-10-PCS | Mod: CPTII,S$GLB,,

## 2022-12-12 PROCEDURE — 71046 XR CHEST PA AND LATERAL: ICD-10-PCS | Mod: S$GLB,,, | Performed by: RADIOLOGY

## 2022-12-12 PROCEDURE — 3078F PR MOST RECENT DIASTOLIC BLOOD PRESSURE < 80 MM HG: ICD-10-PCS | Mod: CPTII,S$GLB,,

## 2022-12-12 RX ORDER — AMOXICILLIN AND CLAVULANATE POTASSIUM 875; 125 MG/1; MG/1
1 TABLET, FILM COATED ORAL 2 TIMES DAILY
Qty: 20 TABLET | Refills: 0 | Status: SHIPPED | OUTPATIENT
Start: 2022-12-12 | End: 2022-12-22

## 2022-12-12 RX ORDER — PROMETHAZINE HYDROCHLORIDE AND DEXTROMETHORPHAN HYDROBROMIDE 6.25; 15 MG/5ML; MG/5ML
5 SYRUP ORAL NIGHTLY PRN
Qty: 118 ML | Refills: 0 | Status: SHIPPED | OUTPATIENT
Start: 2022-12-12 | End: 2022-12-22

## 2022-12-12 RX ORDER — GUAIFENESIN 600 MG/1
1200 TABLET, EXTENDED RELEASE ORAL 2 TIMES DAILY
Qty: 28 TABLET | Refills: 0 | Status: SHIPPED | OUTPATIENT
Start: 2022-12-12 | End: 2022-12-19

## 2022-12-12 RX ORDER — METHYLPREDNISOLONE 4 MG/1
TABLET ORAL
Qty: 21 TABLET | Refills: 0 | Status: SHIPPED | OUTPATIENT
Start: 2022-12-12 | End: 2024-01-08 | Stop reason: ALTCHOICE

## 2022-12-12 RX ORDER — AZITHROMYCIN 250 MG/1
TABLET, FILM COATED ORAL
Qty: 6 TABLET | Refills: 0 | Status: SHIPPED | OUTPATIENT
Start: 2022-12-12 | End: 2022-12-17

## 2022-12-12 RX ORDER — BENZONATATE 100 MG/1
100 CAPSULE ORAL 3 TIMES DAILY PRN
Qty: 30 CAPSULE | Refills: 0 | Status: SHIPPED | OUTPATIENT
Start: 2022-12-12 | End: 2022-12-22

## 2022-12-12 NOTE — PROGRESS NOTES
Subjective:       Patient ID: Apoorva Fernandez is a 67 y.o. female.    Vitals:  height is 5' (1.524 m) and weight is 102.1 kg (225 lb). Her oral temperature is 98.6 °F (37 °C). Her blood pressure is 128/76 and her pulse is 75. Her respiration is 18 and oxygen saturation is 97%.     Chief Complaint: Cough    67-year-old female presents complaints of cough, chest congestion, sinus congestion that has been going on since 11/18/2022.  Patient states that she was seen on 11/18/2022 and diagnosed with a viral URI.  She was given over-the-counter recommendations to begin using for her viral URI.  She states her symptoms have not improved and she is now feeling worse with productive sputum.    Cough  This is a new problem. The current episode started 1 to 4 weeks ago. The problem has been gradually worsening. The problem occurs constantly. The cough is Productive of sputum. Associated symptoms include nasal congestion, postnasal drip and shortness of breath. Pertinent negatives include no chest pain, chills, sore throat or wheezing. Associated symptoms comments: Body aches   . Nothing aggravates the symptoms. She has tried prescription cough suppressant for the symptoms. The treatment provided no relief.     Constitution: Negative for chills, sweating and fatigue.   HENT:  Positive for congestion and postnasal drip. Negative for sinus pain, sinus pressure and sore throat.    Cardiovascular:  Negative for chest pain and sob on exertion.   Respiratory:  Positive for cough, sputum production and shortness of breath. Negative for wheezing.      Objective:      Physical Exam   Constitutional: She is oriented to person, place, and time. She appears well-developed. She is cooperative.  Non-toxic appearance. She does not appear ill. No distress.   HENT:   Head: Normocephalic and atraumatic.   Ears:   Right Ear: Hearing, tympanic membrane, external ear and ear canal normal.   Left Ear: Hearing, tympanic membrane, external ear and  ear canal normal.   Nose: Congestion present. No mucosal edema, rhinorrhea or nasal deformity. No epistaxis. Right sinus exhibits no maxillary sinus tenderness and no frontal sinus tenderness. Left sinus exhibits no maxillary sinus tenderness and no frontal sinus tenderness.   Mouth/Throat: Uvula is midline, oropharynx is clear and moist and mucous membranes are normal. No trismus in the jaw. Normal dentition. No uvula swelling. No oropharyngeal exudate or posterior oropharyngeal erythema.   Eyes: Conjunctivae, EOM and lids are normal. Pupils are equal, round, and reactive to light. Right eye exhibits no discharge. Left eye exhibits no discharge. No scleral icterus.   Neck: Trachea normal and phonation normal. Neck supple.   Cardiovascular: Normal rate, regular rhythm, normal heart sounds and normal pulses.   Pulmonary/Chest: Effort normal and breath sounds normal. No respiratory distress. She has no wheezes. She has no rhonchi. She has no rales.         Comments: Chest congestion, deep productive cough    Abdominal: Normal appearance and bowel sounds are normal. She exhibits no distension and no mass. Soft. There is no abdominal tenderness.   Musculoskeletal: Normal range of motion.         General: No deformity. Normal range of motion.   Neurological: She is alert and oriented to person, place, and time. She exhibits normal muscle tone. Coordination normal.   Skin: Skin is warm, dry, intact, not diaphoretic and not pale.   Psychiatric: Her speech is normal and behavior is normal. Judgment and thought content normal.   Nursing note and vitals reviewed.      XR CHEST PA AND LATERAL    Result Date: 12/12/2022  EXAMINATION: XR CHEST PA AND LATERAL CLINICAL HISTORY: Acute cough TECHNIQUE: PA and lateral views of the chest were performed. COMPARISON: 08/01/2022 FINDINGS: The cardiomediastinal silhouette is not enlarged, magnified by technique..  There is no pleural effusion.  The trachea is midline.  The lungs are  symmetrically expanded bilaterally with coarse interstitial attenuation in a perihilar distribution..  No large focal consolidation seen.  There is no pneumothorax.  The osseous structures are remarkable for degenerative changes..     1. Perihilar findings may reflect accentuation related to shallow inspiratory effort and habitus however early edema or other nonspecific pneumonitis can present in this fashion, correlation is advised. Electronically signed by: Quinton Lemons MD Date:    12/12/2022 Time:    16:27     Assessment:       1. Pneumonitis    2. Acute cough          Plan:       Discussed chest x-ray results with patient.  Patient has possible pneumonitis on chest x-ray.  Will treat for dual coverage antibiotics for suspicion of pneumonia.  Recommended Mucinex and cough medicine to help with cough.  Return to clinic with any new or worsening symptoms.    Pneumonitis  -     amoxicillin-clavulanate 875-125mg (AUGMENTIN) 875-125 mg per tablet; Take 1 tablet by mouth 2 (two) times daily. for 10 days  Dispense: 20 tablet; Refill: 0  -     azithromycin (Z-LAINEY) 250 MG tablet; Take 2 tablets by mouth on day 1; Take 1 tablet by mouth on days 2-5  Dispense: 6 tablet; Refill: 0  -     guaiFENesin (MUCINEX) 600 mg 12 hr tablet; Take 2 tablets (1,200 mg total) by mouth 2 (two) times daily. for 7 days  Dispense: 28 tablet; Refill: 0  -     benzonatate (TESSALON) 100 MG capsule; Take 1 capsule (100 mg total) by mouth 3 (three) times daily as needed for Cough.  Dispense: 30 capsule; Refill: 0  -     promethazine-dextromethorphan (PROMETHAZINE-DM) 6.25-15 mg/5 mL Syrp; Take 5 mLs by mouth nightly as needed. Do not take maximum of 30mLs in 24hrs  Dispense: 118 mL; Refill: 0  -     methylPREDNISolone (MEDROL DOSEPACK) 4 mg tablet; Take 6 pills on top line on Day one Take 5 pills on second line , ect.  Dispense: 21 tablet; Refill: 0    Acute cough  -     XR CHEST PA AND LATERAL; Future; Expected date: 12/12/2022

## 2022-12-27 ENCOUNTER — HOSPITAL ENCOUNTER (OUTPATIENT)
Dept: RADIOLOGY | Facility: HOSPITAL | Age: 67
Discharge: HOME | End: 2022-12-27
Attending: INTERNAL MEDICINE
Payer: COMMERCIAL

## 2022-12-27 VITALS — WEIGHT: 225.06 LBS | HEIGHT: 60 IN | BODY MASS INDEX: 44.19 KG/M2

## 2022-12-27 DIAGNOSIS — Z12.31 SCREENING MAMMOGRAM, ENCOUNTER FOR: ICD-10-CM

## 2022-12-27 PROCEDURE — 77063 MAMMO DIGITAL SCREENING BILAT WITH TOMO: ICD-10-PCS | Mod: 26,,, | Performed by: RADIOLOGY

## 2022-12-27 PROCEDURE — 77063 BREAST TOMOSYNTHESIS BI: CPT | Mod: TC

## 2022-12-27 PROCEDURE — 77067 MAMMO DIGITAL SCREENING BILAT WITH TOMO: ICD-10-PCS | Mod: 26,,, | Performed by: RADIOLOGY

## 2022-12-27 PROCEDURE — 77067 SCR MAMMO BI INCL CAD: CPT | Mod: 26,,, | Performed by: RADIOLOGY

## 2022-12-27 PROCEDURE — 77067 SCR MAMMO BI INCL CAD: CPT | Mod: TC

## 2022-12-27 PROCEDURE — 77063 BREAST TOMOSYNTHESIS BI: CPT | Mod: 26,,, | Performed by: RADIOLOGY

## 2023-01-31 ENCOUNTER — PATIENT MESSAGE (OUTPATIENT)
Dept: FAMILY MEDICINE | Facility: CLINIC | Age: 68
End: 2023-01-31
Payer: COMMERCIAL

## 2023-01-31 DIAGNOSIS — J30.9 ALLERGIC RHINITIS, UNSPECIFIED SEASONALITY, UNSPECIFIED TRIGGER: ICD-10-CM

## 2023-01-31 RX ORDER — FLUTICASONE PROPIONATE 50 MCG
SPRAY, SUSPENSION (ML) NASAL
Qty: 48 G | Refills: 1 | Status: SHIPPED | OUTPATIENT
Start: 2023-01-31

## 2023-01-31 NOTE — TELEPHONE ENCOUNTER
No new care gaps identified.  [facility] Embedded Care Gaps. Reference number: [id number]. 1/31/2023   12:26:55 PM CST

## 2023-06-12 ENCOUNTER — OFFICE VISIT (OUTPATIENT)
Dept: URGENT CARE | Facility: CLINIC | Age: 68
End: 2023-06-12
Payer: COMMERCIAL

## 2023-06-12 VITALS
HEIGHT: 60 IN | HEART RATE: 70 BPM | BODY MASS INDEX: 44.17 KG/M2 | OXYGEN SATURATION: 95 % | RESPIRATION RATE: 18 BRPM | TEMPERATURE: 98 F | DIASTOLIC BLOOD PRESSURE: 71 MMHG | WEIGHT: 225 LBS | SYSTOLIC BLOOD PRESSURE: 147 MMHG

## 2023-06-12 DIAGNOSIS — R05.9 COUGH, UNSPECIFIED TYPE: Primary | ICD-10-CM

## 2023-06-12 DIAGNOSIS — R07.9 CHEST PAIN, UNSPECIFIED TYPE: ICD-10-CM

## 2023-06-12 PROCEDURE — 93010 EKG 12-LEAD: ICD-10-PCS | Mod: S$GLB,,, | Performed by: INTERNAL MEDICINE

## 2023-06-12 PROCEDURE — 99213 OFFICE O/P EST LOW 20 MIN: CPT | Mod: S$GLB,,,

## 2023-06-12 PROCEDURE — 71046 X-RAY EXAM CHEST 2 VIEWS: CPT | Mod: S$GLB,,, | Performed by: RADIOLOGY

## 2023-06-12 PROCEDURE — 93005 EKG 12-LEAD: ICD-10-PCS | Mod: S$GLB,,,

## 2023-06-12 PROCEDURE — 99213 PR OFFICE/OUTPT VISIT, EST, LEVL III, 20-29 MIN: ICD-10-PCS | Mod: S$GLB,,,

## 2023-06-12 PROCEDURE — 71046 XR CHEST PA AND LATERAL: ICD-10-PCS | Mod: S$GLB,,, | Performed by: RADIOLOGY

## 2023-06-12 PROCEDURE — 93010 ELECTROCARDIOGRAM REPORT: CPT | Mod: S$GLB,,, | Performed by: INTERNAL MEDICINE

## 2023-06-12 PROCEDURE — 93005 ELECTROCARDIOGRAM TRACING: CPT | Mod: S$GLB,,,

## 2023-06-12 RX ORDER — BENZONATATE 200 MG/1
200 CAPSULE ORAL 3 TIMES DAILY PRN
Qty: 30 CAPSULE | Refills: 0 | Status: SHIPPED | OUTPATIENT
Start: 2023-06-12 | End: 2023-06-22

## 2023-06-12 NOTE — PROGRESS NOTES
Subjective:      Patient ID: Apoorva Fernandez is a 68 y.o. female.    Vitals:  height is 5' (1.524 m) and weight is 102.1 kg (225 lb). Her oral temperature is 98.1 °F (36.7 °C). Her blood pressure is 147/71 (abnormal) and her pulse is 70. Her respiration is 18 and oxygen saturation is 95%.     Chief Complaint: Cough    Pt is a 67 y/o F with hx of HLD, HTN, BELLA, GERD who presents with coughing, chest congestion, chest pressure that started last night. Diarrhea today. Taking flonase and tylenol. Chest pain is not associated with exertion. She feels it more when she takes a deep breath. Thinks it may be her GERD acting up. Denies SoB, wheezing, fever, chills, headache, myalgias, nausea, vomiting, diaphoresis, numbness or tingling, abdominal pain.    Cough  This is a new problem. The current episode started yesterday. The problem has been unchanged. The problem occurs constantly. The cough is Productive of sputum. Associated symptoms include chest pain. Pertinent negatives include no chills, ear pain, eye redness, fever, headaches, myalgias, rash, sore throat, shortness of breath or wheezing. Treatments tried: tylenol.     Constitution: Negative for chills and fever.   HENT:  Positive for congestion. Negative for ear pain and sore throat.    Neck: Negative for neck pain and neck stiffness.   Cardiovascular:  Positive for chest pain. Negative for leg swelling, palpitations and sob on exertion.   Eyes:  Negative for eye discharge and eye redness.   Respiratory:  Positive for cough and sputum production. Negative for chest tightness, shortness of breath and wheezing.    Gastrointestinal:  Negative for abdominal pain, nausea, vomiting and diarrhea.   Genitourinary:  Negative for dysuria.   Musculoskeletal:  Negative for muscle ache.   Skin:  Negative for rash.   Allergic/Immunologic: Negative for sneezing.   Neurological:  Negative for dizziness, headaches, numbness and tingling.    Objective:     Physical Exam    Constitutional: She is oriented to person, place, and time. She appears well-developed.   HENT:   Head: Normocephalic and atraumatic.   Ears:   Right Ear: Tympanic membrane, external ear and ear canal normal.   Left Ear: Tympanic membrane, external ear and ear canal normal.   Nose: Nose normal.   Mouth/Throat: Oropharynx is clear and moist. Mucous membranes are moist. Oropharynx is clear.   Eyes: Conjunctivae, EOM and lids are normal. Pupils are equal, round, and reactive to light.   Neck: Trachea normal and phonation normal. Neck supple.   Cardiovascular: Normal rate, regular rhythm, normal heart sounds and normal pulses.   Pulmonary/Chest: Effort normal and breath sounds normal. No respiratory distress. She has no wheezes.   Abdominal: She exhibits no distension. Soft. There is no abdominal tenderness.   Musculoskeletal: Normal range of motion.         General: Normal range of motion.   Neurological: no focal deficit. She is alert and oriented to person, place, and time. No cranial nerve deficit.   Skin: Skin is warm, dry and intact. Capillary refill takes less than 2 seconds.   Psychiatric: Her speech is normal and behavior is normal. Judgment and thought content normal.   Nursing note and vitals reviewed.    Results for orders placed or performed in visit on 11/18/22   POCT Influenza A/B MOLECULAR   Result Value Ref Range    POC Molecular Influenza A Ag Negative Negative, Not Reported    POC Molecular Influenza B Ag Negative Negative, Not Reported     Acceptable Yes    POCT COVID-19 Rapid Screening   Result Value Ref Range    POC Rapid COVID Negative Negative     Acceptable Yes      XR CHEST PA AND LATERAL    Result Date: 6/12/2023  EXAMINATION: XR CHEST PA AND LATERAL CLINICAL HISTORY: Chest pain, unspecified TECHNIQUE: PA and lateral views of the chest were performed. COMPARISON: 12/12/2022 FINDINGS: Patient is rotated. The cardiomediastinal silhouette is not enlarged, magnified  by technique.  There is tortuosity of the aorta..  There is no pleural effusion.  The trachea is midline.  The lungs are symmetrically expanded bilaterally with coarse interstitial attenuation bilaterally.  There is right basilar subsegmental atelectasis.  Surgical change projects over the right upper quadrant..  No large focal consolidation seen.  There is no pneumothorax.  The osseous structures are remarkable for degenerative changes..     1. Coarse interstitial attenuation, similar to the previous exam, no large focal consolidation. Electronically signed by: Quinton Lemons MD Date:    06/12/2023 Time:    16:59     EKG: HR 64, NSR, RBBB.    Assessment:     1. Cough, unspecified type    2. Chest pain, unspecified type        Plan:       Cough, unspecified type  -     SARS Coronavirus 2 Antigen, POCT Manual Read  -     benzonatate (TESSALON) 200 MG capsule; Take 1 capsule (200 mg total) by mouth 3 (three) times daily as needed for Cough.  Dispense: 30 capsule; Refill: 0    Chest pain, unspecified type  -     IN OFFICE EKG 12-LEAD (to [facility])  -     XR CHEST PA AND LATERAL; Future; Expected date: 06/12/2023      Patient is a 68-year-old female who is presenting with coughing, chest congestion, chest pressure that started last night.  Blood pressure mildly elevated 147/71.  Other vital signs within normal limits.  On physical exam, patient is not nontoxic and afebrile.  RRR.  Lungs clear to auscultation bilaterally.   X-ray shows no acute abnormality.  EKG with normal sinus rhythm and right bundle-branch block.  No acute ischemic changes.  Suspect coughing due to viral etiology and discussed supportive care with patient.  Discussed with patient that chest pain could be due to GERD.  There is no GI cocktail available in clinic.  Advised patient to continue with Pepcid and Tums at home as she is not taken that yet.  Low suspicion for cardiac etiology.   Strict ED precautions discussed.  Follow-up with PCP. Patient  verbalizes understanding and agrees with plan.            Patient Instructions   - Rest.    - Drink plenty of fluids.  - Viral upper respiratory infections typically run their course in 10-14 days.      - You can take over-the-counter claritin, zyrtec, allegra, or xyzal as directed. These are antihistamines that can help with runny nose, nasal congestion, sneezing, and helps to dry up post-nasal drip, which usually causes sore throat and cough.              - If you do NOT have high blood pressure, you may use a decongestant form (D)  of this medication (ie. Claritin- D, zyrtec-D, allegra-D) or if you do not take the D form, you can take sudafed (pseudoephedrine) over the counter, which is a decongestant. Do NOT take two decongestant (D) medications at the same time (such as mucinex-D and claritin-D or plain sudafed and claritin D)    - If you DO have Hypertension, anxiety, or palpitations, it is safe to take Coricidin HBP for relief of sinus symptoms.     - You can use Flonase (fluticasone) nasal spray as directed for sinus congestion and postnasal drip. This is a steroid nasal spray that works locally over time to decrease the inflammation in your nose/sinuses and help with allergic symptoms. This is not an quick- relief spray like afrin, but it works well if used daily.  Discontinue if you develop nose bleed  - use nasal saline prior to Flonase.  - Use Ocean Spray Nasal Saline 1-3 puffs each nostril every 2-3 hours then blow out onto tissue. This is to irrigate the nasal passage way to clear the sinus openings. Use until sinus problem resolved.     - you can take plain Mucinex (guaifenesin) 1200 mg twice a day to help loosen mucous.      -warm salt water gargles can help with sore throat     - warm tea with honey can help with cough. Honey is a natural cough suppressant.     - Dextromethorphan (DM) is a cough suppressant over the counter (ie. mucinex DM, robitussin, delsym; dayquil/nyquil has DM as well.)         - Follow up with your PCP or specialty clinic as directed in the next 1-2 weeks if not improved or as needed.  You can call [phone] to schedule an appointment with the appropriate provider.       - Go to the ER if you develop new or worsening symptoms.      - You must understand that you have received an Urgent Care treatment only and that you may be released before all of your medical problems are known or treated.   - You, the patient, will arrange for follow up care as instructed.   - If your condition worsens or fails to improve we recommend that you receive another evaluation at the ER immediately or contact your PCP to discuss your concerns or return here.

## 2023-06-12 NOTE — PATIENT INSTRUCTIONS

## 2023-07-29 DIAGNOSIS — I10 ESSENTIAL HYPERTENSION: ICD-10-CM

## 2023-07-29 NOTE — TELEPHONE ENCOUNTER
Care Due:                  Date            Visit Type   Department     Provider  --------------------------------------------------------------------------------                                MYCHART                              ANNUAL       [facility]                              CHECKUP/PHY  MED/ INTERNAL  Nato Saavedra  Last Visit: 11-      S            MED/ PEDS      Adam Rangel  Next Visit: None Scheduled  None         None Found                                                            Last  Test          Frequency    Reason                     Performed    Due Date  --------------------------------------------------------------------------------    CMP.........  12 months..  triamterene-hydrochloroth  08- 08-                             immanuel...................    [employer] Embedded Care Due Messages. Reference number: [id number].   7/29/2023 5:02:59 PM CDT

## 2023-07-30 NOTE — TELEPHONE ENCOUNTER
Refill Routing Note   Medication(s) are not appropriate for processing by [facility] for the following reason(s):      Required vitals abnormal    [facility] action(s):  Defer Care Due:  Labs due            Appointments  past 12m or future 3m with PCP    Date Provider   Last Visit   11/9/2022 Najma Rangel MD   Next Visit   Visit date not found Najma Rangel MD   ED visits in past 90 days: 0        Note composed:11:38 AM 07/30/2023

## 2023-07-31 RX ORDER — TRIAMTERENE AND HYDROCHLOROTHIAZIDE 37.5; 25 MG/1; MG/1
1 CAPSULE ORAL EVERY MORNING
Qty: 90 CAPSULE | Refills: 0 | Status: SHIPPED | OUTPATIENT
Start: 2023-07-31 | End: 2023-10-27

## 2023-09-13 DIAGNOSIS — I10 ESSENTIAL HYPERTENSION: ICD-10-CM

## 2023-09-29 ENCOUNTER — PATIENT MESSAGE (OUTPATIENT)
Dept: FAMILY MEDICINE | Facility: CLINIC | Age: 68
End: 2023-09-29
Payer: COMMERCIAL

## 2023-09-29 RX ORDER — SCOLOPAMINE TRANSDERMAL SYSTEM 1 MG/1
1 PATCH, EXTENDED RELEASE TRANSDERMAL
Qty: 10 PATCH | Refills: 0 | Status: SHIPPED | OUTPATIENT
Start: 2023-09-29 | End: 2023-10-09

## 2023-10-27 DIAGNOSIS — I10 ESSENTIAL HYPERTENSION: ICD-10-CM

## 2023-10-27 RX ORDER — TRIAMTERENE AND HYDROCHLOROTHIAZIDE 37.5; 25 MG/1; MG/1
1 CAPSULE ORAL EVERY MORNING
Qty: 90 CAPSULE | Refills: 0 | Status: SHIPPED | OUTPATIENT
Start: 2023-10-27 | End: 2024-01-17

## 2023-10-27 NOTE — TELEPHONE ENCOUNTER
Refill Routing Note   Medication(s) are not appropriate for processing by [facility] for the following reason(s):      Required labs outdated  Required vitals abnormal    [facility] action(s):  Defer Care Due:  Labs due            Appointments  past 12m or future 3m with PCP    Date Provider   Last Visit   11/9/2022 Najma Rangel MD   Next Visit   1/8/2024 Najma Rangel MD   ED visits in past 90 days: 0        Note composed:9:54 AM 10/27/2023

## 2023-10-27 NOTE — TELEPHONE ENCOUNTER
Care Due:                  Date            Visit Type   Department     Provider  --------------------------------------------------------------------------------                                MYCHART                              ANNUAL       [facility]                              CHECKUP/PHY  MED/ INTERNAL  Nato Saavedra  Last Visit: 11-      S            MED/ PEDS      Adam Rangel                              EP -         [facility]                              PRIMARY      MED/ INTERNAL  Nato Saavedra  Next Visit: 01-      CARE ([facility])   MED/ PEDS      Adam Rangel                                                            Last  Test          Frequency    Reason                     Performed    Due Date  --------------------------------------------------------------------------------    CMP.........  12 months..  triamterene-hydrochloroth  08- 08-                             immanuel...................    [employer] Embedded Care Due Messages. Reference number: [id number].   10/27/2023 1:46:23 AM CDT

## 2023-12-28 ENCOUNTER — TELEPHONE (OUTPATIENT)
Dept: FAMILY MEDICINE | Facility: CLINIC | Age: 68
End: 2023-12-28
Payer: COMMERCIAL

## 2023-12-28 DIAGNOSIS — I10 ESSENTIAL HYPERTENSION: Primary | ICD-10-CM

## 2023-12-28 DIAGNOSIS — E78.49 OTHER HYPERLIPIDEMIA: ICD-10-CM

## 2023-12-28 NOTE — TELEPHONE ENCOUNTER
----- Message from Fina Moffett sent at 12/28/2023 10:41 AM CST -----  Regarding: Feliciano spouse [phone]  Type: Lab    Caller is requesting to schedule their Lab appointment prior to annual appointment.    Order is not listed in EPIC.  Please enter order and contact patient to schedule.    Name of Caller: Feliciano spouse    Preferred Date and Time of Labs: 1/5/24    Date of [facility] Appointment:1/8/24    Where would they like the lab performed? [facility]    Would the patient rather a call back or a response via [facility]? Call back     Best Call Back Number: [phone]

## 2024-01-03 ENCOUNTER — LAB VISIT (OUTPATIENT)
Dept: LAB | Facility: HOSPITAL | Age: 69
End: 2024-01-03
Attending: INTERNAL MEDICINE
Payer: COMMERCIAL

## 2024-01-03 DIAGNOSIS — I10 ESSENTIAL HYPERTENSION: ICD-10-CM

## 2024-01-03 DIAGNOSIS — E78.49 OTHER HYPERLIPIDEMIA: ICD-10-CM

## 2024-01-03 LAB
ALBUMIN SERPL BCP-MCNC: 3.6 G/DL (ref 3.5–5.2)
ALP SERPL-CCNC: 84 U/L (ref 55–135)
ALT SERPL W/O P-5'-P-CCNC: 19 U/L (ref 10–44)
ANION GAP SERPL CALC-SCNC: 12 MMOL/L (ref 8–16)
AST SERPL-CCNC: 15 U/L (ref 10–40)
BASOPHILS # BLD AUTO: 0.05 K/UL (ref 0–0.2)
BASOPHILS NFR BLD: 0.8 % (ref 0–1.9)
BILIRUB SERPL-MCNC: 0.8 MG/DL (ref 0.1–1)
BUN SERPL-MCNC: 27 MG/DL (ref 8–23)
CALCIUM SERPL-MCNC: 9.3 MG/DL (ref 8.7–10.5)
CHLORIDE SERPL-SCNC: 102 MMOL/L (ref 95–110)
CHOLEST SERPL-MCNC: 237 MG/DL (ref 120–199)
CHOLEST/HDLC SERPL: 4 {RATIO} (ref 2–5)
CO2 SERPL-SCNC: 26 MMOL/L (ref 23–29)
CREAT SERPL-MCNC: 1.2 MG/DL (ref 0.5–1.4)
DIFFERENTIAL METHOD BLD: ABNORMAL
EOSINOPHIL # BLD AUTO: 0.2 K/UL (ref 0–0.5)
EOSINOPHIL NFR BLD: 3.4 % (ref 0–8)
ERYTHROCYTE [DISTWIDTH] IN BLOOD BY AUTOMATED COUNT: 13.6 % (ref 11.5–14.5)
EST. GFR  (NO RACE VARIABLE): 49 ML/MIN/1.73 M^2
GLUCOSE SERPL-MCNC: 102 MG/DL (ref 70–110)
HCT VFR BLD AUTO: 39.3 % (ref 37–48.5)
HDLC SERPL-MCNC: 59 MG/DL (ref 40–75)
HDLC SERPL: 24.9 % (ref 20–50)
HGB BLD-MCNC: 12.9 G/DL (ref 12–16)
IMM GRANULOCYTES # BLD AUTO: 0.02 K/UL (ref 0–0.04)
IMM GRANULOCYTES NFR BLD AUTO: 0.3 % (ref 0–0.5)
LDLC SERPL CALC-MCNC: 150 MG/DL (ref 63–159)
LYMPHOCYTES # BLD AUTO: 1.3 K/UL (ref 1–4.8)
LYMPHOCYTES NFR BLD: 21.3 % (ref 18–48)
MCH RBC QN AUTO: 31.5 PG (ref 27–31)
MCHC RBC AUTO-ENTMCNC: 32.8 G/DL (ref 32–36)
MCV RBC AUTO: 96 FL (ref 82–98)
MONOCYTES # BLD AUTO: 0.6 K/UL (ref 0.3–1)
MONOCYTES NFR BLD: 9.4 % (ref 4–15)
NEUTROPHILS # BLD AUTO: 4 K/UL (ref 1.8–7.7)
NEUTROPHILS NFR BLD: 64.8 % (ref 38–73)
NONHDLC SERPL-MCNC: 178 MG/DL
NRBC BLD-RTO: 0 /100 WBC
PLATELET # BLD AUTO: 276 K/UL (ref 150–450)
PMV BLD AUTO: 9.8 FL (ref 9.2–12.9)
POTASSIUM SERPL-SCNC: 3.8 MMOL/L (ref 3.5–5.1)
PROT SERPL-MCNC: 7.7 G/DL (ref 6–8.4)
RBC # BLD AUTO: 4.09 M/UL (ref 4–5.4)
SODIUM SERPL-SCNC: 140 MMOL/L (ref 136–145)
TRIGL SERPL-MCNC: 140 MG/DL (ref 30–150)
WBC # BLD AUTO: 6.2 K/UL (ref 3.9–12.7)

## 2024-01-03 PROCEDURE — 80061 LIPID PANEL: CPT | Performed by: INTERNAL MEDICINE

## 2024-01-03 PROCEDURE — 36415 COLL VENOUS BLD VENIPUNCTURE: CPT | Mod: PO | Performed by: INTERNAL MEDICINE

## 2024-01-03 PROCEDURE — 80053 COMPREHEN METABOLIC PANEL: CPT | Performed by: INTERNAL MEDICINE

## 2024-01-03 PROCEDURE — 85025 COMPLETE CBC W/AUTO DIFF WBC: CPT | Performed by: INTERNAL MEDICINE

## 2024-01-08 ENCOUNTER — OFFICE VISIT (OUTPATIENT)
Dept: FAMILY MEDICINE | Facility: CLINIC | Age: 69
End: 2024-01-08
Payer: COMMERCIAL

## 2024-01-08 VITALS
DIASTOLIC BLOOD PRESSURE: 76 MMHG | SYSTOLIC BLOOD PRESSURE: 138 MMHG | TEMPERATURE: 98 F | HEIGHT: 60 IN | HEART RATE: 67 BPM | BODY MASS INDEX: 45.82 KG/M2 | WEIGHT: 233.38 LBS | OXYGEN SATURATION: 94 %

## 2024-01-08 DIAGNOSIS — E78.49 OTHER HYPERLIPIDEMIA: ICD-10-CM

## 2024-01-08 DIAGNOSIS — G72.0 STATIN MYOPATHY: ICD-10-CM

## 2024-01-08 DIAGNOSIS — Z12.11 ENCOUNTER FOR COLORECTAL CANCER SCREENING: ICD-10-CM

## 2024-01-08 DIAGNOSIS — Z00.00 ROUTINE MEDICAL EXAM: Primary | ICD-10-CM

## 2024-01-08 DIAGNOSIS — R06.02 SHORTNESS OF BREATH: ICD-10-CM

## 2024-01-08 DIAGNOSIS — T46.6X5A STATIN MYOPATHY: ICD-10-CM

## 2024-01-08 DIAGNOSIS — G47.33 OSA (OBSTRUCTIVE SLEEP APNEA): ICD-10-CM

## 2024-01-08 DIAGNOSIS — M06.00 SERONEGATIVE RHEUMATOID ARTHRITIS: ICD-10-CM

## 2024-01-08 DIAGNOSIS — I10 ESSENTIAL HYPERTENSION: ICD-10-CM

## 2024-01-08 DIAGNOSIS — Z12.12 ENCOUNTER FOR COLORECTAL CANCER SCREENING: ICD-10-CM

## 2024-01-08 DIAGNOSIS — Z12.31 SCREENING MAMMOGRAM, ENCOUNTER FOR: ICD-10-CM

## 2024-01-08 DIAGNOSIS — M17.0 PRIMARY OSTEOARTHRITIS OF BOTH KNEES: ICD-10-CM

## 2024-01-08 DIAGNOSIS — E66.01 MORBID OBESITY WITH BMI OF 45.0-49.9, ADULT: ICD-10-CM

## 2024-01-08 PROCEDURE — 99397 PER PM REEVAL EST PAT 65+ YR: CPT | Mod: S$GLB,,, | Performed by: INTERNAL MEDICINE

## 2024-01-08 PROCEDURE — 3075F SYST BP GE 130 - 139MM HG: CPT | Mod: CPTII,S$GLB,, | Performed by: INTERNAL MEDICINE

## 2024-01-08 PROCEDURE — 3288F FALL RISK ASSESSMENT DOCD: CPT | Mod: CPTII,S$GLB,, | Performed by: INTERNAL MEDICINE

## 2024-01-08 PROCEDURE — 3008F BODY MASS INDEX DOCD: CPT | Mod: CPTII,S$GLB,, | Performed by: INTERNAL MEDICINE

## 2024-01-08 PROCEDURE — 4010F ACE/ARB THERAPY RXD/TAKEN: CPT | Mod: CPTII,S$GLB,, | Performed by: INTERNAL MEDICINE

## 2024-01-08 PROCEDURE — 3078F DIAST BP <80 MM HG: CPT | Mod: CPTII,S$GLB,, | Performed by: INTERNAL MEDICINE

## 2024-01-08 PROCEDURE — 1126F AMNT PAIN NOTED NONE PRSNT: CPT | Mod: CPTII,S$GLB,, | Performed by: INTERNAL MEDICINE

## 2024-01-08 PROCEDURE — 1101F PT FALLS ASSESS-DOCD LE1/YR: CPT | Mod: CPTII,S$GLB,, | Performed by: INTERNAL MEDICINE

## 2024-01-08 PROCEDURE — 99999 PR PBB SHADOW E&M-EST. PATIENT-LVL V: CPT | Mod: PBBFAC,,, | Performed by: INTERNAL MEDICINE

## 2024-01-08 PROCEDURE — 1159F MED LIST DOCD IN RCRD: CPT | Mod: CPTII,S$GLB,, | Performed by: INTERNAL MEDICINE

## 2024-01-08 PROCEDURE — 1160F RVW MEDS BY RX/DR IN RCRD: CPT | Mod: CPTII,S$GLB,, | Performed by: INTERNAL MEDICINE

## 2024-01-08 RX ORDER — LOSARTAN POTASSIUM 50 MG/1
50 TABLET ORAL DAILY
Qty: 90 TABLET | Refills: 1 | Status: SHIPPED | OUTPATIENT
Start: 2024-01-08 | End: 2024-01-16 | Stop reason: SINTOL

## 2024-01-08 NOTE — PROGRESS NOTES
CHIEF COMPLAINT:   Chief Complaint   Patient presents with    Annual Exam          HISTORY OF PRESENT ILLNESS:  Apoorva Fernandez is a 69 y.o. female who presents to the clinic today for a routine physical exam. Her last physical exam was approximately 1 years(s) ago.    Subjective    PAST MEDICAL HISTORY:  Past Medical History:   Diagnosis Date    Allergic rhinitis     Allergy     Arthritis     Hyperlipidemia     Diet-controlled    Hypertension     Joint pain     Keloid cicatrix     Obesity     Osteoarthritis     Personal history of colonic polyps 2010    Adenomatous    Personal history of peptic ulcer disease     PONV (postoperative nausea and vomiting)     Sleep apnea     Umbilical hernia        PAST SURGICAL HISTORY:  Past Surgical History:   Procedure Laterality Date    Abdominal/umbilical hernia repair      removed small part of small bowel; mesh placed     SECTION, LOW TRANSVERSE      X2    CHOLECYSTECTOMY      COLONOSCOPY N/A 3/26/2019    Procedure: COLONOSCOPY;  Surgeon: Chandrika Ho MD;  Location: [facility] ENDO;  Service: Endoscopy;  Laterality: N/A;    Open laparoscopy for a ruptured ulcer      TOTAL KNEE ARTHROPLASTY Right 8/15/2022    Procedure: ARTHROPLASTY, KNEE, TOTAL- JOAN;  Surgeon: Jass Willis MD;  Location: [facility] OR;  Service: Orthopedics;  Laterality: Right;  NAE SALGADO [phone] TEXTED HIM @ 7:06AM  ON 27:06AM ON 2022  1ST BALWINDER DAVIS [phone] TEXTED HER @ 7:16AM ON 2022 REPLIED @ 7:18AM ON 2022  NOON START  SUPINE  RN PREOP 2022    T/S ON 2022       HAS MEDICAL TESHA       SOCIAL HISTORY:  Social History     Socioeconomic History    Marital status:     Number of children: 2   Occupational History    Occupation:    Tobacco Use    Smoking status: Never    Smokeless tobacco: Never   Substance and Sexual Activity    Alcohol use: Yes     Alcohol/week: 2.0 - 3.0 standard drinks of alcohol      Types: 2 - 3 Glasses of wine per week     Comment: Socially on the weekends    Drug use: No    Sexual activity: Yes     Partners: Male     Social Determinants of Health     Financial Resource Strain: Low Risk  (2024)    Overall Financial Resource Strain (CARDIA)     Difficulty of Paying Living Expenses: Not hard at all   Food Insecurity: No Food Insecurity (2024)    Hunger Vital Sign     Worried About Running Out of Food in the Last Year: Never true     Ran Out of Food in the Last Year: Never true   Transportation Needs: No Transportation Needs (2024)    PRAPARE - Transportation     Lack of Transportation (Medical): No     Lack of Transportation (Non-Medical): No   Physical Activity: Insufficiently Active (2024)    Exercise Vital Sign     Days of Exercise per Week: 3 days     Minutes of Exercise per Session: 30 min   Stress: No Stress Concern Present (2024)    [place] [employer] - Occupational Stress Questionnaire     Feeling of Stress : Not at all   Social Connections: Unknown (2024)    Social Connection and Isolation Panel [NHANES]     Frequency of Communication with Friends and Family: More than three times a week     Frequency of Social Gatherings with Friends and Family: Once a week     Active Member of Clubs or Organizations: Yes     Attends Club or Organization Meetings: More than 4 times per year     Marital Status:    Housing Stability: Low Risk  (2024)    Housing Stability Vital Sign     Unable to Pay for Housing in the Last Year: No     Number of Places Lived in the Last Year: 1     Unstable Housing in the Last Year: No       FAMILY HISTORY:  Family History   Problem Relation Age of Onset    Breast cancer Mother          at age 65    Cancer Father         Bladder    Glaucoma Father     Hypertension Father     Heart disease Father     Obesity Sister     Heart disease Brother     Sleep apnea Brother     Diabetes Neg Hx     Melanoma Neg Hx         ALLERGIES AND MEDICATIONS: updated and reviewed.  Review of patient's allergies indicates:   Allergen Reactions    Antihistamines - alkylamine Shortness Of Breath    Atorvastatin      Other reaction(s): myalgia  Other reaction(s): myalgia    Ezetimibe      Other reaction(s): muscle cramps  Other reaction(s): muscle cramps     Medication List with Changes/Refills   New Medications    LOSARTAN (COZAAR) 50 MG TABLET    Take 1 tablet (50 mg total) by mouth once daily.   Current Medications    ASPIRIN (ECOTRIN) 81 MG EC TABLET    Take 1 tablet (81 mg total) by mouth 2 (two) times a day.    DOCUSATE SODIUM (COLACE) 100 MG CAPSULE    Take 1 capsule (100 mg total) by mouth 2 (two) times daily as needed for Constipation.    FAMOTIDINE (PEPCID) 20 MG TABLET    Take 20 mg by mouth 2 (two) times daily.    FLUTICASONE PROPIONATE (FLONASE) 50 MCG/ACTUATION NASAL SPRAY    SPRAY 2 SPRAYs INTO EACH NOSTRIL ONCE EVERY DAY    LIDOCAINE (LIDODERM) 5 %    Place 1 patch onto the skin every 24 hours. Remove & Discard patch within 12 hours or as directed by MD    OMEGA 3-DHA-EPA-FISH OIL 1,000 (120-180) MG CAP    Take 1 capsule by mouth once daily.    ONDANSETRON (ZOFRAN) 4 MG TABLET    Take 4 mg by mouth every 8 (eight) hours as needed.    TRIAMTERENE-HYDROCHLOROTHIAZIDE 37.5-25 MG (DYAZIDE) 37.5-25 MG PER CAPSULE    TAKE 1 CAPSULE BY MOUTH EVERY DAY IN THE MORNING   Discontinued Medications    METHYLPREDNISOLONE (MEDROL DOSEPACK) 4 MG TABLET    Take 6 pills on top line on Day one Take 5 pills on second line , ect.          CARE TEAM:  Patient Care Team:  Najma Rangel MD as PCP - General (Internal Medicine)       SCREENING HISTORY:  Health Maintenance         Date Due Completion Date    RSV Vaccine (Age 60+ and Pregnant patients) (1 - 1-dose 60+ series) Never done ---    TETANUS VACCINE 07/15/2018 7/15/2008    Mammogram 12/27/2023 12/27/2022    Colorectal Cancer Screening 03/26/2024 3/26/2019    Hemoglobin A1c (Diabetic  Prevention Screening) 08/01/2025 8/1/2022    Lipid Panel 01/03/2029 1/3/2024    DEXA Scan 01/14/2030 1/14/2020              REVIEW OF SYSTEMS:  The patient reports : fair dietary habits - she admits to eating carbs, sweets, cheese and red meat on a regular basis  The patient reports  : that they do not exercise regularly  Review of Systems   Constitutional:  Negative for chills and fever.   HENT:  Negative for congestion.    Respiratory:  Positive for shortness of breath (with exertion). Negative for cough.    Cardiovascular:  Negative for chest pain.   Gastrointestinal:  Negative for abdominal pain.   Genitourinary:  Negative for dysuria.   Musculoskeletal:  Positive for arthralgias (followed by ortho) and back pain.      ROS (Optional)-: no pelvic pain  Breast ROS (Optional)-: negative for breast lumps/discharge          Objective    PHYSICAL EXAMINATION/VITALS:  Vitals:    01/08/24 1301   BP: 138/76   Pulse: 67   Temp: 97.6 °F (36.4 °C)   TempSrc: Oral   SpO2: (!) 94%   Weight: 105.8 kg (233 lb 5.7 oz)   Height: 5' (1.524 m)        Body mass index is 45.57 kg/m².      General appearance - alert, well appearing, and in no distress, morbidly obese  Psychiatric - alert, oriented to person, place, and time, normal behavior, speech, dress, motor activity and thought process  Eyes - pupils equal and reactive, extraocular eye movements intact, sclera anicteric  Neck - supple, no significant adenopathy, carotids upstroke normal bilaterally, no bruits  Lymphatics - no palpable cervical lymphadenopathy  Chest - clear to auscultation, no wheezes, rales or rhonchi, symmetric air entry  Heart - normal rate and regular rhythm  Neurological - alert, normal speech, no focal findings; cranial nerves II through XII intact  Musculoskeletal - not examined  Extremities - pedal edema trace, lymphedema noted - mild  Skin - normal coloration, no suspicious skin lesions      LABS:  Results for orders placed or performed in visit on  01/03/24   Comprehensive Metabolic Panel   Result Value Ref Range    Sodium 140 136 - 145 mmol/L    Potassium 3.8 3.5 - 5.1 mmol/L    Chloride 102 95 - 110 mmol/L    CO2 26 23 - 29 mmol/L    Glucose 102 70 - 110 mg/dL    BUN 27 (H) 8 - 23 mg/dL    Creatinine 1.2 0.5 - 1.4 mg/dL    Calcium 9.3 8.7 - 10.5 mg/dL    Total Protein 7.7 6.0 - 8.4 g/dL    Albumin 3.6 3.5 - 5.2 g/dL    Total Bilirubin 0.8 0.1 - 1.0 mg/dL    Alkaline Phosphatase 84 55 - 135 U/L    AST 15 10 - 40 U/L    ALT 19 10 - 44 U/L    eGFR 49.0 (A) >60 mL/min/1.73 m^2    Anion Gap 12 8 - 16 mmol/L   Lipid Panel   Result Value Ref Range    Cholesterol 237 (H) 120 - 199 mg/dL    Triglycerides 140 30 - 150 mg/dL    HDL 59 40 - 75 mg/dL    LDL Cholesterol 150.0 63.0 - 159.0 mg/dL    HDL/Cholesterol Ratio 24.9 20.0 - 50.0 %    Total Cholesterol/HDL Ratio 4.0 2.0 - 5.0    Non-HDL Cholesterol 178 mg/dL   CBC Auto Differential   Result Value Ref Range    WBC 6.20 3.90 - 12.70 K/uL    RBC 4.09 4.00 - 5.40 M/uL    Hemoglobin 12.9 12.0 - 16.0 g/dL    Hematocrit 39.3 37.0 - 48.5 %    MCV 96 82 - 98 fL    MCH 31.5 (H) 27.0 - 31.0 pg    MCHC 32.8 32.0 - 36.0 g/dL    RDW 13.6 11.5 - 14.5 %    Platelets 276 150 - 450 K/uL    MPV 9.8 9.2 - 12.9 fL    Immature Granulocytes 0.3 0.0 - 0.5 %    Gran # (ANC) 4.0 1.8 - 7.7 K/uL    Immature Grans (Abs) 0.02 0.00 - 0.04 K/uL    Lymph # 1.3 1.0 - 4.8 K/uL    Mono # 0.6 0.3 - 1.0 K/uL    Eos # 0.2 0.0 - 0.5 K/uL    Baso # 0.05 0.00 - 0.20 K/uL    nRBC 0 0 /100 WBC    Gran % 64.8 38.0 - 73.0 %    Lymph % 21.3 18.0 - 48.0 %    Mono % 9.4 4.0 - 15.0 %    Eosinophil % 3.4 0.0 - 8.0 %    Basophil % 0.8 0.0 - 1.9 %    Differential Method Automated                 ASSESSMENT AND PLAN:   1. Routine medical exam  Counseled on age appropriate medical preventative services including age appropriate cancer screenings, age appropriate eye and dental exams, over all nutritional health, need for a consistent exercise regimen, and an over all  push towards maintaining a vigorous and active lifestyle.  Counseled on age appropriate vaccines and discussed upcoming health care needs based on age/gender. Discussed good sleep hygiene and stress management.    2. Essential hypertension  BP Readings from Last 1 Encounters:   01/08/24 138/76      BP is controlled, but her kidney function has decreased since last year and not sure if this is due to the triamterene (she does feel like her overall swelling is better).  I will change her to losartan. She will keep me posted on her BP. Recheck BMP in one month.  -     Basic Metabolic Panel; Future; Expected date: 01/08/2024  -     losartan (COZAAR) 50 MG tablet; Take 1 tablet (50 mg total) by mouth once daily.  Dispense: 90 tablet; Refill: 1    3. Other hyperlipidemia/4. Statin myopathy  Lab Results   Component Value Date    CHOL 237 (H) 01/03/2024     Lab Results   Component Value Date    HDL 59 01/03/2024     Lab Results   Component Value Date    LDLCALC 150.0 01/03/2024     Lab Results   Component Value Date    TRIG 140 01/03/2024     Lab Results   Component Value Date    LDLCALC 150.0 01/03/2024     We discussed low fat diet and regular exercise. Patient has statin myopathy.  Overview:  Diet-controlled  - could not tolerate lipitor and zetia      5. Seronegative rheumatoid arthritis  She is not seeing rheumatology at this time. Symptoms overall stable. Observe.    6. Primary osteoarthritis of both knees  The current medical regimen is effective;  continue present plan and medications.   Followed by: Orthopaedics.   Overview:  - improvement in right knee pain with cortisone shot      7. BELLA (obstructive sleep apnea)  Will see pulmonology if she decides to pursue further evaluation.  Overview:  Pt will need new home sleep study in order to qualify for cpap again. Pt states can't afford at moment. She will contact us back when she has funding. In the meantime, she is working on diet, exercise and weight loss.          8. Morbid obesity (BMI 45.0-49.9) with comorbidity  BMI Readings from Last 3 Encounters:   01/08/24 45.57 kg/m²   06/12/23 43.94 kg/m²   12/27/22 43.96 kg/m²     The patient is asked to make an attempt to improve diet and exercise patterns to aid in medical management of this problem.  We discussed documenting daily calorie intake and limiting daily added sugar intake (5g/serving of any product; 20 g/day total).    9. Encounter for colorectal cancer screening    -     Ambulatory referral/consult to Endo Procedure ; Future; Expected date: 01/09/2024    10. Screening mammogram, encounter for    -     Mammo Digital Screening Bilat w/ Abundio; Future; Expected date: 01/08/2024    11. Shortness of breath  She reports SOB with exertion. She is aware this may be due to body habitus. Will refer to cardiology to rule out cardiac cause.  -     Ambulatory referral/consult to Cardiology; Future; Expected date: 01/15/2024             Orders Placed This Encounter   Procedures    Mammo Digital Screening Bilat w/ Abundio    Basic Metabolic Panel    Ambulatory referral/consult to Endo Procedure     Ambulatory referral/consult to Cardiology      FOLLOW UP: Follow up in about 1 year (around 1/8/2025), or if symptoms worsen or fail to improve, for annual exam. or sooner as needed.

## 2024-01-16 DIAGNOSIS — I10 ESSENTIAL HYPERTENSION: Primary | ICD-10-CM

## 2024-01-16 RX ORDER — AMLODIPINE BESYLATE 5 MG/1
5 TABLET ORAL DAILY
Qty: 30 TABLET | Refills: 0 | Status: SHIPPED | OUTPATIENT
Start: 2024-01-16 | End: 2024-01-20

## 2024-01-17 ENCOUNTER — TELEPHONE (OUTPATIENT)
Dept: FAMILY MEDICINE | Facility: CLINIC | Age: 69
End: 2024-01-17
Payer: COMMERCIAL

## 2024-01-17 NOTE — TELEPHONE ENCOUNTER
"Patient questioned whether or not she has to take 2 new blood pressure pills Norvasc, & Triamterene? Informed patient yes she does. Patient had Triamterene previously ordered. Questioned patient was it 37.5-25mg? Patient states "Yes, it is". Patient was questioned  has she taken her blood pressure medication for the day? Patient states "No, I just got my medication from the pharmacy. Patient instructed to take her Norvasc now, & wait until the am to take her Dyazide due to specific order frequency for every morning. Patient instructed in the am to empty her bladder, sit at the kitchen table with feet flat on the floor & take her blood pressure to have an idea of where her blood pressure is. If blood pressure is less than 90/60 do not take her blood pressure medication, contact the clinic for further instructions. If Blood pressure is 190/90 go to [facility] immediately. Patient instructed to wait an hour after taking her medication on tomorrow to record what her reading is.Patient instructed to take her blood pressure medication at the same time every day and keep a notebook to record her readings. Patient stated 100% understanding.  "

## 2024-01-17 NOTE — TELEPHONE ENCOUNTER
Spoke with pharmacy. Spoke with pharmacist to cancel triamterene-hydrochlorothiazide. Pharmacist confirmed cancellation.   Spoke with patient. Patient verbalized understanding to just take the amlodipine. Patient was advised to keep track of blood pressure for the next two weeks and keep us updated with any significant changed. Patient verbalized understanding.

## 2024-01-17 NOTE — TELEPHONE ENCOUNTER
----- Message from Bhumi Cecil sent at 1/17/2024  3:12 PM CST -----  Regarding: [phone]  Type: Patient Call Back    Who called:self     What is the request in detail: pt states she will like a call back from the nurse in regards to her blood pressure medication and will like to see what meds the doctor wants her to take     Can the clinic reply by [facility]?no     Would the patient rather a call back or a response via [facility]? Call back     Best call back number:[phone]      Additional Information:

## 2024-01-18 ENCOUNTER — HOSPITAL ENCOUNTER (OUTPATIENT)
Dept: RADIOLOGY | Facility: HOSPITAL | Age: 69
Discharge: HOME | End: 2024-01-18
Attending: INTERNAL MEDICINE
Payer: COMMERCIAL

## 2024-01-18 DIAGNOSIS — Z12.31 SCREENING MAMMOGRAM, ENCOUNTER FOR: ICD-10-CM

## 2024-01-18 PROCEDURE — 77063 BREAST TOMOSYNTHESIS BI: CPT | Mod: 26,,, | Performed by: RADIOLOGY

## 2024-01-18 PROCEDURE — 77067 SCR MAMMO BI INCL CAD: CPT | Mod: TC,PO

## 2024-01-18 PROCEDURE — 77067 SCR MAMMO BI INCL CAD: CPT | Mod: 26,,, | Performed by: RADIOLOGY

## 2024-01-19 ENCOUNTER — HOSPITAL ENCOUNTER (OUTPATIENT)
Facility: HOSPITAL | Age: 69
Discharge: HOME | End: 2024-01-23
Attending: EMERGENCY MEDICINE | Admitting: STUDENT IN AN ORGANIZED HEALTH CARE EDUCATION/TRAINING PROGRAM
Payer: COMMERCIAL

## 2024-01-19 DIAGNOSIS — R06.02 SHORTNESS OF BREATH: ICD-10-CM

## 2024-01-19 DIAGNOSIS — R94.39 ABNORMAL CARDIOVASCULAR STRESS TEST: ICD-10-CM

## 2024-01-19 DIAGNOSIS — R07.9 CHEST PAIN: Primary | ICD-10-CM

## 2024-01-19 LAB
ALBUMIN SERPL BCP-MCNC: 3.6 G/DL (ref 3.5–5.2)
ALP SERPL-CCNC: 81 U/L (ref 55–135)
ALT SERPL W/O P-5'-P-CCNC: 18 U/L (ref 10–44)
ANION GAP SERPL CALC-SCNC: 6 MMOL/L (ref 8–16)
AST SERPL-CCNC: 16 U/L (ref 10–40)
BASOPHILS # BLD AUTO: 0.03 K/UL (ref 0–0.2)
BASOPHILS NFR BLD: 0.5 % (ref 0–1.9)
BILIRUB SERPL-MCNC: 0.9 MG/DL (ref 0.1–1)
BNP SERPL-MCNC: 30 PG/ML (ref 0–99)
BUN SERPL-MCNC: 16 MG/DL (ref 8–23)
CALCIUM SERPL-MCNC: 9.1 MG/DL (ref 8.7–10.5)
CHLORIDE SERPL-SCNC: 108 MMOL/L (ref 95–110)
CO2 SERPL-SCNC: 23 MMOL/L (ref 23–29)
CREAT SERPL-MCNC: 0.8 MG/DL (ref 0.5–1.4)
D DIMER PPP IA.FEU-MCNC: 0.75 MG/L FEU
DIFFERENTIAL METHOD BLD: NORMAL
EOSINOPHIL # BLD AUTO: 0.2 K/UL (ref 0–0.5)
EOSINOPHIL NFR BLD: 3.2 % (ref 0–8)
ERYTHROCYTE [DISTWIDTH] IN BLOOD BY AUTOMATED COUNT: 13.4 % (ref 11.5–14.5)
EST. GFR  (NO RACE VARIABLE): >60 ML/MIN/1.73 M^2
GLUCOSE SERPL-MCNC: 91 MG/DL (ref 70–110)
HCT VFR BLD AUTO: 38.3 % (ref 37–48.5)
HCV AB SERPL QL IA: NORMAL
HGB BLD-MCNC: 12.4 G/DL (ref 12–16)
HIV 1+2 AB+HIV1 P24 AG SERPL QL IA: NORMAL
IMM GRANULOCYTES # BLD AUTO: 0.02 K/UL (ref 0–0.04)
IMM GRANULOCYTES NFR BLD AUTO: 0.4 % (ref 0–0.5)
LYMPHOCYTES # BLD AUTO: 1.5 K/UL (ref 1–4.8)
LYMPHOCYTES NFR BLD: 26.7 % (ref 18–48)
MCH RBC QN AUTO: 30.7 PG (ref 27–31)
MCHC RBC AUTO-ENTMCNC: 32.4 G/DL (ref 32–36)
MCV RBC AUTO: 95 FL (ref 82–98)
MONOCYTES # BLD AUTO: 0.5 K/UL (ref 0.3–1)
MONOCYTES NFR BLD: 8.6 % (ref 4–15)
NEUTROPHILS # BLD AUTO: 3.5 K/UL (ref 1.8–7.7)
NEUTROPHILS NFR BLD: 60.6 % (ref 38–73)
NRBC BLD-RTO: 0 /100 WBC
PLATELET # BLD AUTO: 261 K/UL (ref 150–450)
PMV BLD AUTO: 9.5 FL (ref 9.2–12.9)
POC CARDIAC TROPONIN I: 0.01 NG/ML (ref 0–0.08)
POTASSIUM SERPL-SCNC: 3.9 MMOL/L (ref 3.5–5.1)
PROT SERPL-MCNC: 7.5 G/DL (ref 6–8.4)
RBC # BLD AUTO: 4.04 M/UL (ref 4–5.4)
SAMPLE: NORMAL
SODIUM SERPL-SCNC: 137 MMOL/L (ref 136–145)
TROPONIN I SERPL DL<=0.01 NG/ML-MCNC: 0.01 NG/ML (ref 0–0.03)
TROPONIN I SERPL DL<=0.01 NG/ML-MCNC: <0.006 NG/ML (ref 0–0.03)
WBC # BLD AUTO: 5.69 K/UL (ref 3.9–12.7)

## 2024-01-19 PROCEDURE — 87389 HIV-1 AG W/HIV-1&-2 AB AG IA: CPT | Performed by: PHYSICIAN ASSISTANT

## 2024-01-19 PROCEDURE — 25000003 PHARM REV CODE 250: Performed by: PHYSICIAN ASSISTANT

## 2024-01-19 PROCEDURE — 25000242 PHARM REV CODE 250 ALT 637 W/ HCPCS: Performed by: PHYSICIAN ASSISTANT

## 2024-01-19 PROCEDURE — 84484 ASSAY OF TROPONIN QUANT: CPT

## 2024-01-19 PROCEDURE — 93005 ELECTROCARDIOGRAM TRACING: CPT

## 2024-01-19 PROCEDURE — 93010 ELECTROCARDIOGRAM REPORT: CPT | Mod: ,,, | Performed by: INTERNAL MEDICINE

## 2024-01-19 PROCEDURE — G0378 HOSPITAL OBSERVATION PER HR: HCPCS

## 2024-01-19 PROCEDURE — 86803 HEPATITIS C AB TEST: CPT | Performed by: PHYSICIAN ASSISTANT

## 2024-01-19 PROCEDURE — 99285 EMERGENCY DEPT VISIT HI MDM: CPT | Mod: 25

## 2024-01-19 PROCEDURE — 80053 COMPREHEN METABOLIC PANEL: CPT | Performed by: PHYSICIAN ASSISTANT

## 2024-01-19 PROCEDURE — 83880 ASSAY OF NATRIURETIC PEPTIDE: CPT | Performed by: PHYSICIAN ASSISTANT

## 2024-01-19 PROCEDURE — 25500020 PHARM REV CODE 255: Performed by: EMERGENCY MEDICINE

## 2024-01-19 PROCEDURE — 85025 COMPLETE CBC W/AUTO DIFF WBC: CPT | Performed by: PHYSICIAN ASSISTANT

## 2024-01-19 PROCEDURE — 84484 ASSAY OF TROPONIN QUANT: CPT | Mod: 91 | Performed by: PHYSICIAN ASSISTANT

## 2024-01-19 PROCEDURE — 85379 FIBRIN DEGRADATION QUANT: CPT | Performed by: PHYSICIAN ASSISTANT

## 2024-01-19 RX ORDER — SIMETHICONE 80 MG
1 TABLET,CHEWABLE ORAL 4 TIMES DAILY PRN
Status: DISCONTINUED | OUTPATIENT
Start: 2024-01-20 | End: 2024-01-23 | Stop reason: HOSPADM

## 2024-01-19 RX ORDER — NITROGLYCERIN 0.4 MG/1
0.4 TABLET SUBLINGUAL EVERY 5 MIN PRN
Status: COMPLETED | OUTPATIENT
Start: 2024-01-19 | End: 2024-01-19

## 2024-01-19 RX ORDER — ALUMINUM HYDROXIDE, MAGNESIUM HYDROXIDE, AND SIMETHICONE 1200; 120; 1200 MG/30ML; MG/30ML; MG/30ML
30 SUSPENSION ORAL 4 TIMES DAILY PRN
Status: DISCONTINUED | OUTPATIENT
Start: 2024-01-20 | End: 2024-01-23 | Stop reason: HOSPADM

## 2024-01-19 RX ORDER — ACETAMINOPHEN 500 MG
1000 TABLET ORAL
Status: ACTIVE | OUTPATIENT
Start: 2024-01-19 | End: 2024-01-20

## 2024-01-19 RX ORDER — BISACODYL 10 MG/1
10 SUPPOSITORY RECTAL DAILY PRN
Status: DISCONTINUED | OUTPATIENT
Start: 2024-01-20 | End: 2024-01-23 | Stop reason: HOSPADM

## 2024-01-19 RX ORDER — IBUPROFEN 200 MG
24 TABLET ORAL
Status: DISCONTINUED | OUTPATIENT
Start: 2024-01-20 | End: 2024-01-23 | Stop reason: HOSPADM

## 2024-01-19 RX ORDER — NALOXONE HCL 0.4 MG/ML
0.02 VIAL (ML) INJECTION
Status: DISCONTINUED | OUTPATIENT
Start: 2024-01-20 | End: 2024-01-23 | Stop reason: HOSPADM

## 2024-01-19 RX ORDER — ONDANSETRON 8 MG/1
8 TABLET, ORALLY DISINTEGRATING ORAL EVERY 8 HOURS PRN
Status: DISCONTINUED | OUTPATIENT
Start: 2024-01-20 | End: 2024-01-23 | Stop reason: HOSPADM

## 2024-01-19 RX ORDER — NITROGLYCERIN 0.4 MG/1
0.4 TABLET SUBLINGUAL EVERY 5 MIN PRN
Status: DISCONTINUED | OUTPATIENT
Start: 2024-01-20 | End: 2024-01-23 | Stop reason: HOSPADM

## 2024-01-19 RX ORDER — SODIUM CHLORIDE 0.9 % (FLUSH) 0.9 %
5 SYRINGE (ML) INJECTION
Status: DISCONTINUED | OUTPATIENT
Start: 2024-01-20 | End: 2024-01-23 | Stop reason: HOSPADM

## 2024-01-19 RX ORDER — GLUCAGON 1 MG
1 KIT INJECTION
Status: DISCONTINUED | OUTPATIENT
Start: 2024-01-20 | End: 2024-01-23 | Stop reason: HOSPADM

## 2024-01-19 RX ORDER — IPRATROPIUM BROMIDE AND ALBUTEROL SULFATE 2.5; .5 MG/3ML; MG/3ML
3 SOLUTION RESPIRATORY (INHALATION) EVERY 4 HOURS PRN
Status: DISCONTINUED | OUTPATIENT
Start: 2024-01-20 | End: 2024-01-23 | Stop reason: HOSPADM

## 2024-01-19 RX ORDER — NITROGLYCERIN 0.4 MG/1
0.4 TABLET SUBLINGUAL
Status: COMPLETED | OUTPATIENT
Start: 2024-01-19 | End: 2024-01-19

## 2024-01-19 RX ORDER — ACETAMINOPHEN 500 MG
1000 TABLET ORAL EVERY 8 HOURS PRN
Status: DISCONTINUED | OUTPATIENT
Start: 2024-01-20 | End: 2024-01-23 | Stop reason: HOSPADM

## 2024-01-19 RX ORDER — ASPIRIN 325 MG
325 TABLET ORAL
Status: COMPLETED | OUTPATIENT
Start: 2024-01-19 | End: 2024-01-19

## 2024-01-19 RX ORDER — ACETAMINOPHEN 325 MG/1
650 TABLET ORAL EVERY 4 HOURS PRN
Status: DISCONTINUED | OUTPATIENT
Start: 2024-01-20 | End: 2024-01-23 | Stop reason: HOSPADM

## 2024-01-19 RX ORDER — POLYETHYLENE GLYCOL 3350 17 G/17G
17 POWDER, FOR SOLUTION ORAL 2 TIMES DAILY PRN
Status: DISCONTINUED | OUTPATIENT
Start: 2024-01-20 | End: 2024-01-23 | Stop reason: HOSPADM

## 2024-01-19 RX ORDER — HEPARIN SODIUM 5000 [USP'U]/ML
7500 INJECTION, SOLUTION INTRAVENOUS; SUBCUTANEOUS EVERY 8 HOURS
Status: DISCONTINUED | OUTPATIENT
Start: 2024-01-20 | End: 2024-01-23 | Stop reason: HOSPADM

## 2024-01-19 RX ORDER — TALC
6 POWDER (GRAM) TOPICAL NIGHTLY PRN
Status: DISCONTINUED | OUTPATIENT
Start: 2024-01-20 | End: 2024-01-23 | Stop reason: HOSPADM

## 2024-01-19 RX ORDER — IBUPROFEN 200 MG
16 TABLET ORAL
Status: DISCONTINUED | OUTPATIENT
Start: 2024-01-20 | End: 2024-01-23 | Stop reason: HOSPADM

## 2024-01-19 RX ADMIN — NITROGLYCERIN 0.4 MG: 0.4 TABLET, ORALLY DISINTEGRATING SUBLINGUAL at 07:01

## 2024-01-19 RX ADMIN — ASPIRIN 325 MG ORAL TABLET 325 MG: 325 PILL ORAL at 05:01

## 2024-01-19 RX ADMIN — NITROGLYCERIN 0.4 MG: 0.4 TABLET, ORALLY DISINTEGRATING SUBLINGUAL at 05:01

## 2024-01-19 RX ADMIN — IOHEXOL 100 ML: 350 INJECTION, SOLUTION INTRAVENOUS at 09:01

## 2024-01-19 RX ADMIN — NITROGLYCERIN 0.4 MG: 0.4 TABLET, ORALLY DISINTEGRATING SUBLINGUAL at 08:01

## 2024-01-19 NOTE — ED PROVIDER NOTES
Encounter Date: 2024       History     Chief Complaint   Patient presents with    Shortness of Breath     SOB, +chest pain      69 year  old female with past medical history of hypertension, hyperlipidemia, obesity who presents emergency department for chest pain and shortness breath.  Reports intermittent substernal chest pain that radiates to the left jaw for the past 2 weeks.  Currently rates it a 7/10.  States it has not worse with exertion however she does have exertional shortness of breath.  Reports sharp stabbing pain with deep inspiration.  States it is more comfortable for her to sleep up in her recliner.  Denies any  new lower extremity swelling.  Recently was taken off triamterene and losartan due to her kidney issues and started on amlodipine yesterday.  She is nonsmoker denies any family history of early cardiac disease.        Review of patient's allergies indicates:   Allergen Reactions    Antihistamines - alkylamine Shortness Of Breath    Losartan Shortness Of Breath    Atorvastatin      Other reaction(s): myalgia  Other reaction(s): myalgia    Ezetimibe      Other reaction(s): muscle cramps  Other reaction(s): muscle cramps     Past Medical History:   Diagnosis Date    Allergic rhinitis     Allergy     Arthritis     Hyperlipidemia     Diet-controlled    Hypertension     Joint pain     Keloid cicatrix     Obesity     Osteoarthritis     Personal history of colonic polyps 2010    Adenomatous    Personal history of peptic ulcer disease     PONV (postoperative nausea and vomiting)     Sleep apnea     Umbilical hernia      Past Surgical History:   Procedure Laterality Date    Abdominal/umbilical hernia repair      removed small part of small bowel; mesh placed     SECTION, LOW TRANSVERSE      X2    CHOLECYSTECTOMY      COLONOSCOPY N/A 3/26/2019    Procedure: COLONOSCOPY;  Surgeon: Chandrika Ho MD;  Location: [facility];  Service: Endoscopy;  Laterality: N/A;    Open  laparoscopy for a ruptured ulcer      TOTAL KNEE ARTHROPLASTY Right 8/15/2022    Procedure: ARTHROPLASTY, KNEE, TOTAL- JOAN;  Surgeon: Jass Willis MD;  Location: [facility];  Service: Orthopedics;  Laterality: Right;  NAE SALGADO [phone] TEXTED HIM @ 7:06AM  ON 27:06AM ON 2022  1ST BALWINDER DAVIS [phone] TEXTED HER @ 7:16AM ON 2022 REPLIED @ 7:18AM ON 2022  NOON START  SUPINE  RN PREOP 2022    T/S ON 2022       HAS MEDICAL TESHA     Family History   Problem Relation Age of Onset    Breast cancer Mother          at age 65    Cancer Father         Bladder    Glaucoma Father     Hypertension Father     Heart disease Father     Obesity Sister     Heart disease Brother     Sleep apnea Brother     Diabetes Neg Hx     Melanoma Neg Hx      Social History     Tobacco Use    Smoking status: Never    Smokeless tobacco: Never   Substance Use Topics    Alcohol use: Yes     Alcohol/week: 2.0 - 3.0 standard drinks of alcohol     Types: 2 - 3 Glasses of wine per week     Comment: Socially on the weekends    Drug use: No     Review of Systems   Constitutional:  Negative for chills and fever.   HENT:  Negative for sore throat.    Respiratory:  Positive for shortness of breath.    Cardiovascular:  Positive for chest pain.   Gastrointestinal:  Negative for abdominal pain.   Genitourinary:  Negative for difficulty urinating and dysuria.   Musculoskeletal: Negative.    Neurological:  Negative for weakness.   Psychiatric/Behavioral:  Negative for confusion.        Physical Exam     Initial Vitals [24 1536]   BP Pulse Resp Temp SpO2   (!) 188/85 65 16 98.2 °F (36.8 °C) 98 %      MAP       --         Physical Exam    Nursing note and vitals reviewed.  Constitutional: She appears well-developed and well-nourished.   HENT:   Head: Normocephalic and atraumatic.   Eyes: Conjunctivae are normal.   Neck: Neck supple.   Normal range of motion.  Cardiovascular:  Normal rate.            Pulmonary/Chest: Breath sounds normal.   Abdominal: Abdomen is soft. There is no abdominal tenderness.   Musculoskeletal:         General: Edema (Bilateral nonpitting edema) present. Normal range of motion.      Cervical back: Normal range of motion and neck supple.     Neurological: She is alert and oriented to person, place, and time. GCS score is 15. GCS eye subscore is 4. GCS verbal subscore is 5. GCS motor subscore is 6.   Skin: Skin is warm and dry. Capillary refill takes less than 2 seconds.         ED Course   Procedures  Labs Reviewed   COMPREHENSIVE METABOLIC PANEL - Abnormal; Notable for the following components:       Result Value    Anion Gap 6 (*)     All other components within normal limits   D DIMER, QUANTITATIVE - Abnormal; Notable for the following components:    D-Dimer 0.75 (*)     All other components within normal limits   HIV 1 / 2 ANTIBODY    Narrative:     Release to patient->Immediate   HEPATITIS C ANTIBODY    Narrative:     Release to patient->Immediate   CBC W/ AUTO DIFFERENTIAL   TROPONIN I   TROPONIN I   B-TYPE NATRIURETIC PEPTIDE   D DIMER, QUANTITATIVE   TROPONIN ISTAT   POCT TROPONIN   POCT TROPONIN        ECG Results              EKG 12-lead (Final result)  Result time 01/19/24 17:02:37      Final result by Interface, Lab In [facility] (01/19/24 17:02:37)                   Narrative:    Test Reason : R07.9,    Vent. Rate : 065 BPM     Atrial Rate : 065 BPM     P-R Int : 174 ms          QRS Dur : 150 ms      QT Int : 478 ms       P-R-T Axes : 000 -30 004 degrees     QTc Int : 497 ms    Normal sinus rhythm  Left axis deviation  Right bundle branch block  Abnormal ECG  When compared with ECG of 12-JUN-2023 16:42,  No significant change was found  Confirmed by Gavino CRAFT MD ([id number]) on 1/19/2024 5:02:24 PM    Referred By:             Confirmed By:Gavino CRAFT MD                                  Imaging Results              CTA Chest Non-Coronary (PE Studies) (Final result)  Result time  01/19/24 22:15:01      Final result by Reji Piper MD (01/19/24 22:15:01)                   Impression:      Examination degraded by respiratory motion artifact.    No definite evidence of pulmonary thromboembolism.    Other findings above.    Electronically signed by resident: Lamin Perales  Date:    01/19/2024  Time:    21:38    Electronically signed by: Reji Piper  Date:    01/19/2024  Time:    22:15               Narrative:    EXAMINATION:  CTA CHEST NON CORONARY (PE STUDIES)    CLINICAL HISTORY:  Pulmonary embolism (PE) suspected, positive D-dimer;    TECHNIQUE:  Low dose axial images, sagittal and coronal reformations were obtained from the thoracic inlet to the lung bases following the IV administration of 100 mL of Omnipaque 350.  Contrast timing was optimized to evaluate the pulmonary arteries.    COMPARISON:  XR chest from same date.    FINDINGS:  SOFT TISSUES: No axillary or subpectoral lymphadenopathy. The visualized thyroid gland is unremarkable.  The left pectoralis muscle appears absent.    HEART AND MEDIASTINUM: Heart and pericardium are within normal limits. No calcifications of the coronary arteries.  No mediastinal or hilar lymphadenopathy.    AORTA: Left-sided aortic arch.  No aneurysm. No significant calcified atherosclerosis of the aortic arch.    PULMONARY VASCULATURE: Pulmonary arteries distribute normally. Pulmonary arteries are brightly opacified.  There is respiratory motion artifact which limits evaluation of the pulmonary vasculature.  No definite intraluminal filling defects to suggest pulmonary thromboembolism.    LUNGS AND AIRWAYS: Evaluation of the lungs is degraded by respiratory motion.  Central airways are patent.  Scattered bandlike opacities likely representing subsegmental atelectasis and/or scarring.  No focal consolidation.  No pleural effusion.    UPPER ABDOMEN: No acute intra-abdominal abnormality.    BONES: Mild degenerative changes.  No acute fracture or  aggressive osseous lesion.                                       X-Ray Chest PA And Lateral (Final result)  Result time 01/19/24 18:09:59      Final result by Quinton Lemons MD (01/19/24 18:09:59)                   Impression:      1. Interstitial findings may reflect edema noting accentuation by shallow inspiratory effort.  No large focal consolidation.      Electronically signed by: Quinton Lemons MD  Date:    01/19/2024  Time:    18:09               Narrative:    EXAMINATION:  XR CHEST PA AND LATERAL    CLINICAL HISTORY:  Shortness of breath    TECHNIQUE:  PA and lateral views of the chest were performed.    COMPARISON:  06/12/2023    FINDINGS:  The cardiomediastinal silhouette is prominent, magnified by technique, stable..  There is no pleural effusion.  The trachea is midline.  The lungs are symmetrically expanded bilaterally with coarse interstitial attenuation bilaterally.  There is bilateral basilar subsegmental atelectasis..  No large focal consolidation seen.  There is no pneumothorax.  The osseous structures are remarkable for degenerative change..                                       Medications   acetaminophen tablet 1,000 mg (0 mg Oral Hold 1/19/24 2045)   aspirin tablet 325 mg (325 mg Oral Given 1/19/24 1711)   nitroGLYCERIN SL tablet 0.4 mg (0.4 mg Sublingual Given 1/19/24 1712)   nitroGLYCERIN SL tablet 0.4 mg (0.4 mg Sublingual Given 1/19/24 2006)   iohexoL (OMNIPAQUE 350) injection 100 mL (100 mLs Intravenous Given 1/19/24 2127)     Medical Decision Making   69-year-old female who presents emergency department for chest tightness and exertional shortness of breath.      Differential includes but not limited to new onset heart failure, ACS, aortic dissection, PE, pneumonia      No STEMI on EKG.  Her initial 2 troponins were normal.   Chest x-ray did not show any acute cardiopulmonary abnormalities.  She does have sharp pain which is worse with deep inspiration concerning for PE and her  D-dimer was elevated.  Subsequent CTA did not show any evidence of PE.  She did have improvement with sublingual nitro x3.  Symptoms were concerning for ACS and she has a elevated heart score of 6.  Will admit to hospital medicine for serial troponins and expedited stress echo.    Amount and/or Complexity of Data Reviewed  Labs: ordered. Decision-making details documented in ED Course.  Radiology: ordered.    Risk  OTC drugs.  Prescription drug management.               ED Course as of 01/19/24 2247 Fri Jan 19, 2024 1818 Troponin I: <0.006 [HJ]   1818 BNP: 30 [HJ]   2031 D-Dimer(!): 0.75   Will obtain CTA to evaluate for PE [HJ]      ED Course User Index  [HJ] Iris Maradiaga PA-C                           Clinical Impression:  Final diagnoses:  [R07.9] Chest pain  [R06.02] Shortness of breath          ED Disposition Condition    Observation Stable                Iris Maradiaga PA-C  01/19/24 2244

## 2024-01-19 NOTE — ED TRIAGE NOTES
68 y/o F presents to ER with c/c SOB several weeks. Pt states she had recent change from losartan due to increased SOB to amlodipine. Pt states she has to sleep in recliner for relief. Denies c/p, SOB, N/V/D, fevers and chills.

## 2024-01-19 NOTE — ED NOTES
Patient identifiers for Apoorva Fernandez 69 y.o. female checked and correct.  Chief Complaint   Patient presents with    Shortness of Breath     SOB, +chest pain     Past Medical History:   Diagnosis Date    Allergic rhinitis     Allergy     Arthritis     Hyperlipidemia     Diet-controlled    Hypertension     Joint pain     Keloid cicatrix     Obesity     Osteoarthritis     Personal history of colonic polyps August 2010    Adenomatous    Personal history of peptic ulcer disease     PONV (postoperative nausea and vomiting)     Sleep apnea     Umbilical hernia      Allergies reported:   Review of patient's allergies indicates:   Allergen Reactions    Antihistamines - alkylamine Shortness Of Breath    Losartan Shortness Of Breath    Atorvastatin      Other reaction(s): myalgia  Other reaction(s): myalgia    Ezetimibe      Other reaction(s): muscle cramps  Other reaction(s): muscle cramps         LOC: Patient is awake, alert, and aware of environment with an appropriate affect. Patient is oriented x 4 and speaking appropriately.  APPEARANCE: Patient resting comfortably and in no acute distress. Patient is clean and well groomed, patient's clothing is properly fastened.  HEENT: - JVD, + midline trach  SKIN: The skin is warm and dry. Patient has normal skin turgor and moist mucus membranes.   MUSKULOSKELETAL: Patient is moving all extremities well, no obvious deformities noted. Pulses intact. PMS x 4  RESPIRATORY: Airway is open and patent. Respirations are spontaneous and non-labored with normal effort and rate. = CBBS. Endorses SOB especially when lying down.  CARDIAC: No peripheral edema noted. + 2 bilateral pedal and radial pulses, < 3 s cap refill.  ABDOMEN: No distention noted. Soft and non-tender upon palpation.  NEUROLOGICAL: pupils 4 mm, PERRL. Facial expression is symmetrical. Hand grasps are equal bilaterally. Normal sensation in all extremities when touched with finger.

## 2024-01-20 PROBLEM — R07.9 CHEST PAIN: Status: ACTIVE | Noted: 2024-01-20

## 2024-01-20 LAB
ANION GAP SERPL CALC-SCNC: 9 MMOL/L (ref 8–16)
ASCENDING AORTA: 3.25 CM
AV INDEX (PROSTH): 0.64
AV MEAN GRADIENT: 6 MMHG
AV PEAK GRADIENT: 10 MMHG
AV VALVE AREA BY VELOCITY RATIO: 2.17 CM²
AV VALVE AREA: 2.2 CM²
AV VELOCITY RATIO: 0.63
BASOPHILS # BLD AUTO: 0.03 K/UL (ref 0–0.2)
BASOPHILS NFR BLD: 0.4 % (ref 0–1.9)
BUN SERPL-MCNC: 16 MG/DL (ref 8–23)
CALCIUM SERPL-MCNC: 8.5 MG/DL (ref 8.7–10.5)
CHLORIDE SERPL-SCNC: 106 MMOL/L (ref 95–110)
CO2 SERPL-SCNC: 20 MMOL/L (ref 23–29)
CREAT SERPL-MCNC: 0.8 MG/DL (ref 0.5–1.4)
CV ECHO LV RWT: 0.42 CM
CV STRESS BASE HR: 59 BPM
DIASTOLIC BLOOD PRESSURE: 84 MMHG
DIFFERENTIAL METHOD BLD: ABNORMAL
DOP CALC AO PEAK VEL: 1.61 M/S
DOP CALC AO VTI: 42.59 CM
DOP CALC LVOT AREA: 3.5 CM2
DOP CALC LVOT DIAMETER: 2.1 CM
DOP CALC LVOT PEAK VEL: 1.01 M/S
DOP CALC LVOT STROKE VOLUME: 93.78 CM3
DOP CALCLVOT PEAK VEL VTI: 27.09 CM
E WAVE DECELERATION TIME: 251.54 MSEC
E/A RATIO: 1.09
E/E' RATIO: 10 M/S
ECHO LV POSTERIOR WALL: 0.89 CM (ref 0.6–1.1)
EJECTION FRACTION: 65 %
EOSINOPHIL # BLD AUTO: 0.2 K/UL (ref 0–0.5)
EOSINOPHIL NFR BLD: 2.8 % (ref 0–8)
ERYTHROCYTE [DISTWIDTH] IN BLOOD BY AUTOMATED COUNT: 13.6 % (ref 11.5–14.5)
EST. GFR  (NO RACE VARIABLE): >60 ML/MIN/1.73 M^2
FRACTIONAL SHORTENING: 50 % (ref 28–44)
GLUCOSE SERPL-MCNC: 92 MG/DL (ref 70–110)
HCT VFR BLD AUTO: 34.5 % (ref 37–48.5)
HGB BLD-MCNC: 11.5 G/DL (ref 12–16)
IMM GRANULOCYTES # BLD AUTO: 0.03 K/UL (ref 0–0.04)
IMM GRANULOCYTES NFR BLD AUTO: 0.4 % (ref 0–0.5)
INTERVENTRICULAR SEPTUM: 0.98 CM (ref 0.6–1.1)
IVRT: 102.76 MSEC
LA MAJOR: 5.74 CM
LA MINOR: 5.13 CM
LA WIDTH: 3.85 CM
LEFT ATRIUM SIZE: 4.24 CM
LEFT ATRIUM VOLUME INDEX MOD: 25 ML/M2
LEFT ATRIUM VOLUME INDEX: 37.8 ML/M2
LEFT ATRIUM VOLUME MOD: 49.83 CM3
LEFT ATRIUM VOLUME: 75.18 CM3
LEFT INTERNAL DIMENSION IN SYSTOLE: 2.11 CM (ref 2.1–4)
LEFT VENTRICLE DIASTOLIC VOLUME INDEX: 39.58 ML/M2
LEFT VENTRICLE DIASTOLIC VOLUME: 78.77 ML
LEFT VENTRICLE MASS INDEX: 63 G/M2
LEFT VENTRICLE SYSTOLIC VOLUME INDEX: 7.3 ML/M2
LEFT VENTRICLE SYSTOLIC VOLUME: 14.61 ML
LEFT VENTRICULAR INTERNAL DIMENSION IN DIASTOLE: 4.2 CM (ref 3.5–6)
LEFT VENTRICULAR MASS: 125.03 G
LV LATERAL E/E' RATIO: 10.56 M/S
LV SEPTAL E/E' RATIO: 9.5 M/S
LYMPHOCYTES # BLD AUTO: 1.6 K/UL (ref 1–4.8)
LYMPHOCYTES NFR BLD: 24.3 % (ref 18–48)
MCH RBC QN AUTO: 31.5 PG (ref 27–31)
MCHC RBC AUTO-ENTMCNC: 33.3 G/DL (ref 32–36)
MCV RBC AUTO: 95 FL (ref 82–98)
MONOCYTES # BLD AUTO: 0.6 K/UL (ref 0.3–1)
MONOCYTES NFR BLD: 9.2 % (ref 4–15)
MV PEAK A VEL: 0.87 M/S
MV PEAK E VEL: 0.95 M/S
NEUTROPHILS # BLD AUTO: 4.2 K/UL (ref 1.8–7.7)
NEUTROPHILS NFR BLD: 62.9 % (ref 38–73)
NRBC BLD-RTO: 0 /100 WBC
OHS CV CPX 1 MINUTE RECOVERY HEART RATE: 106 BPM
OHS CV CPX 85 PERCENT MAX PREDICTED HEART RATE MALE: 128
OHS CV CPX MAX PREDICTED HEART RATE: 150
OHS CV CPX PATIENT IS FEMALE: 1
OHS CV CPX PATIENT IS MALE: 0
OHS CV CPX PEAK DIASTOLIC BLOOD PRESSURE: 63 MMHG
OHS CV CPX PEAK HEAR RATE: 112 BPM
OHS CV CPX PEAK RATE PRESSURE PRODUCT: ABNORMAL
OHS CV CPX PEAK SYSTOLIC BLOOD PRESSURE: 150 MMHG
OHS CV CPX PERCENT MAX PREDICTED HEART RATE ACHIEVED: 78
OHS CV CPX RATE PRESSURE PRODUCT PRESENTING: ABNORMAL
OHS CV INITIAL DOSE: 10 MCG/KG/MIN
OHS CV PEAK DOSE: 30 MCG/KG/MIN
PLATELET # BLD AUTO: 226 K/UL (ref 150–450)
PMV BLD AUTO: 9.4 FL (ref 9.2–12.9)
POST STRESS EJECTION FRACTION: 73 %
POTASSIUM SERPL-SCNC: 3.5 MMOL/L (ref 3.5–5.1)
RA MAJOR: 4.3 CM
RA PRESSURE ESTIMATED: 3 MMHG
RA WIDTH: 3.39 CM
RBC # BLD AUTO: 3.65 M/UL (ref 4–5.4)
RIGHT VENTRICULAR END-DIASTOLIC DIMENSION: 2.9 CM
SINUS: 3 CM
SODIUM SERPL-SCNC: 135 MMOL/L (ref 136–145)
STJ: 2.49 CM
STRESS ECHO POST EXERCISE DUR MIN: 6 MINUTES
SYSTOLIC BLOOD PRESSURE: 192 MMHG
TDI LATERAL: 0.09 M/S
TDI SEPTAL: 0.1 M/S
TDI: 0.1 M/S
TRICUSPID ANNULAR PLANE SYSTOLIC EXCURSION: 1.97 CM
TROPONIN I SERPL DL<=0.01 NG/ML-MCNC: 0.01 NG/ML (ref 0–0.03)
WBC # BLD AUTO: 6.72 K/UL (ref 3.9–12.7)
Z-SCORE OF LEFT VENTRICULAR DIMENSION IN END DIASTOLE: -3.18
Z-SCORE OF LEFT VENTRICULAR DIMENSION IN END SYSTOLE: -4.1

## 2024-01-20 PROCEDURE — 63600175 PHARM REV CODE 636 W HCPCS

## 2024-01-20 PROCEDURE — 96372 THER/PROPH/DIAG INJ SC/IM: CPT | Mod: 59

## 2024-01-20 PROCEDURE — 25000003 PHARM REV CODE 250

## 2024-01-20 PROCEDURE — 80048 BASIC METABOLIC PNL TOTAL CA: CPT

## 2024-01-20 PROCEDURE — 63600175 PHARM REV CODE 636 W HCPCS: Performed by: STUDENT IN AN ORGANIZED HEALTH CARE EDUCATION/TRAINING PROGRAM

## 2024-01-20 PROCEDURE — 96372 THER/PROPH/DIAG INJ SC/IM: CPT

## 2024-01-20 PROCEDURE — G0378 HOSPITAL OBSERVATION PER HR: HCPCS

## 2024-01-20 PROCEDURE — 84484 ASSAY OF TROPONIN QUANT: CPT

## 2024-01-20 PROCEDURE — 85025 COMPLETE CBC W/AUTO DIFF WBC: CPT

## 2024-01-20 PROCEDURE — 96374 THER/PROPH/DIAG INJ IV PUSH: CPT | Mod: 59

## 2024-01-20 RX ORDER — DOBUTAMINE HYDROCHLORIDE 400 MG/100ML
10 INJECTION INTRAVENOUS
Status: COMPLETED | OUTPATIENT
Start: 2024-01-20 | End: 2024-01-20

## 2024-01-20 RX ORDER — AMLODIPINE BESYLATE 5 MG/1
5 TABLET ORAL ONCE AS NEEDED
Status: DISCONTINUED | OUTPATIENT
Start: 2024-01-20 | End: 2024-01-21

## 2024-01-20 RX ORDER — NAPROXEN SODIUM 220 MG/1
81 TABLET, FILM COATED ORAL DAILY
Status: DISCONTINUED | OUTPATIENT
Start: 2024-01-20 | End: 2024-01-23 | Stop reason: HOSPADM

## 2024-01-20 RX ADMIN — HEPARIN SODIUM 7500 UNITS: 5000 INJECTION INTRAVENOUS; SUBCUTANEOUS at 06:01

## 2024-01-20 RX ADMIN — DOBUTAMINE HYDROCHLORIDE 10 MCG/KG/MIN: 400 INJECTION INTRAVENOUS at 01:01

## 2024-01-20 RX ADMIN — ASPIRIN 81 MG CHEWABLE TABLET 81 MG: 81 TABLET CHEWABLE at 08:01

## 2024-01-20 RX ADMIN — HEPARIN SODIUM 7500 UNITS: 5000 INJECTION INTRAVENOUS; SUBCUTANEOUS at 08:01

## 2024-01-20 NOTE — ASSESSMENT & PLAN NOTE
69F with history of HTN, HLD, obesity presenting with new onset of substernal chest pain and GORDILLO x2 two weeks. Radiates to neck /jaw, arms. Chest pain improved with nitroglycerin. HEART score 6.  - Initially hypertensive but improved and vitals otherwise stable  - Troponin x2 normal. BNP wnl  - D dimer slightly elevated. CTA negative for PE but limited study.   - S/p aspirin 325 and nitro x3 in ED  - Monitor telemetry   - Check Stress  Echo  - Check DVT US   - NPO at MN  - Aspirin 81 mg daily  - PRN nitroglycerin

## 2024-01-20 NOTE — HPI
Apoorva Fernandez is a 69 y.o. female with PMHx of HTN, HLD, obesity, She presents to the ED with chest pain. For about two weeks now she has had substernal chest pressure with associated dyspnea on exertion. She has had to stop when walking very short distances to catch her breath. The chest pain has radiated to her arms and the left jaw/neck. Chest pain/GORDILLO today not relieved with rest and severe. Felt like there was an elephant siting on her chest. After getting 3 nitroglycerin and aspirin in ED the pain improved and felt more like a baby sitting on her chest. Some associated lightheadedness. Has leg swelling but this is more chronic, especially after stopped her fluid pills.  Denies fever, chills, cough, nausea, vomiting. Her BP has not been very well controlled lately and she has been changing medications with her PCP. States she was taking triamterene HCTZ but stopped recently due to her kidney function. Started losartan but then stopped around 1/10 because of her chest pain that she though may be associated with losartan. Started amlodipine 5 mg 1/18 then stopped due to chest pain. SBP 1/18 recorded 179/67 and 165/72.     In the ED, Initial /85, improved s/p nitroglycerin. Otherwise afebrile and vitals stable. Troponin x2 wnl. BNP 30. D-dimer slightly elevated to 0.75. CBC and CMP unremarkable. EKG NSR, Left axis deviation, RBBB, rate 65. CXR with coarse interstitial attenuation and basilar subsegmental atelectasis bilaterally.  CTA without definite evidence of PE but examination degraded by respiratory motion artifact. She was given aspirin 325 mg, nitroglycerin x3. Admitted to  for chest pain.

## 2024-01-20 NOTE — NURSING
Pt arrived to unit from stress test via stretcher. Pt. A&O x's 4. VSS. Tele on. Pt. Oriented to room. All questions answered. RN will continue to monitor.

## 2024-01-20 NOTE — SUBJECTIVE & OBJECTIVE
Interval History: NAEON. Pt seen and evaluated at bedside this am. Pt went for stress test today that was negative for ischemia but has short run of VT, discussing with cardiology. Pt currently chest pain free but reporting GORDILLO. Will continue to monitor overnight for BP control, cards input, GORDILLO w/u.    Review of Systems   Constitutional:  Negative for activity change, chills, diaphoresis, fatigue and fever.   HENT:  Negative for congestion, rhinorrhea and sore throat.    Respiratory:  Positive for shortness of breath. Negative for cough, chest tightness and wheezing.    Cardiovascular:  Negative for chest pain, palpitations and leg swelling.   Gastrointestinal:  Negative for abdominal distention, abdominal pain, blood in stool, constipation, diarrhea, nausea and vomiting.   Genitourinary:  Negative for difficulty urinating, dysuria, frequency, hematuria and urgency.   Musculoskeletal:  Negative for arthralgias, back pain and neck stiffness.   Neurological:  Negative for dizziness, tremors, seizures, syncope, weakness, light-headedness, numbness and headaches.   Psychiatric/Behavioral:  Negative for agitation, confusion and hallucinations.      Objective:     Vital Signs (Most Recent):  Temp: 97.9 °F (36.6 °C) (01/20/24 1610)  Pulse: 66 (01/20/24 1610)  Resp: 18 (01/20/24 1610)  BP: (!) 146/66 (01/20/24 1610)  SpO2: 99 % (01/20/24 1610) Vital Signs (24h Range):  Temp:  [97.7 °F (36.5 °C)-98.7 °F (37.1 °C)] 97.9 °F (36.6 °C)  Pulse:  [56-69] 66  Resp:  [15-18] 18  SpO2:  [98 %-100 %] 99 %  BP: (120-192)/(59-85) 146/66     Weight: 103.5 kg (228 lb 4.6 oz)  Body mass index is 44.58 kg/m².  No intake or output data in the 24 hours ending 01/20/24 1619      Physical Exam  Vitals and nursing note reviewed.   Constitutional:       General: She is not in acute distress.     Appearance: She is obese.   HENT:      Head: Normocephalic and atraumatic.      Nose: Nose normal.      Mouth/Throat:      Pharynx: No oropharyngeal  exudate.   Eyes:      Extraocular Movements: Extraocular movements intact.      Conjunctiva/sclera: Conjunctivae normal.   Cardiovascular:      Rate and Rhythm: Normal rate and regular rhythm.      Heart sounds: Normal heart sounds.   Pulmonary:      Effort: Pulmonary effort is normal. No respiratory distress.      Breath sounds: Normal breath sounds.   Abdominal:      Palpations: Abdomen is soft.      Tenderness: There is no abdominal tenderness.   Musculoskeletal:      Cervical back: Normal range of motion.      Right lower leg: Edema present.      Left lower leg: Edema present.      Comments: BLE edema that is chronic per pt. No rash observed.   RLE edema > LLE edema chronically   Skin:     General: Skin is warm and dry.   Neurological:      General: No focal deficit present.      Mental Status: She is alert and oriented to person, place, and time.   Psychiatric:         Mood and Affect: Mood normal.         Behavior: Behavior normal.             Significant Labs: All pertinent labs within the past 24 hours have been reviewed.    Significant Imaging: I have reviewed all pertinent imaging results/findings within the past 24 hours.

## 2024-01-20 NOTE — ASSESSMENT & PLAN NOTE
- Currently diet controlled  - Did not tolerate Lipitor or Zetia  - Last lipid panel 1/3 reviewed

## 2024-01-20 NOTE — ASSESSMENT & PLAN NOTE
Body mass index is 44.58 kg/m². Morbid obesity complicates all aspects of disease management from diagnostic modalities to treatment. Weight loss encouraged and health benefits explained to patient.

## 2024-01-20 NOTE — ASSESSMENT & PLAN NOTE
Body mass index is 45.5 kg/m². Morbid obesity complicates all aspects of disease management from diagnostic modalities to treatment. Weight loss encouraged and health benefits explained to patient.

## 2024-01-20 NOTE — ASSESSMENT & PLAN NOTE
69F with history of HTN, HLD, obesity presenting with new onset of substernal chest pain and GORDILLO x2 two weeks. Radiates to neck /jaw, arms. Chest pain improved with nitroglycerin. HEART score 6.  - Initially hypertensive but improved and vitals otherwise stable  - Troponin x2 normal. BNP wnl   - EKG without acute ischemic change  - D dimer slightly elevated. CTA negative for PE but limited study.   - S/p aspirin 325 and nitro x3 in ED  - Monitor telemetry   - Stress  Echo reviewed  - DVT US negative  - Aspirin 81 mg daily  - PRN nitroglycerin

## 2024-01-20 NOTE — PROGRESS NOTES
[facility] [address] [place] [address] Medicine  Progress Note    Patient Name: Apoorva Fernandez  MRN: [id number]  Patient Class: OP- Observation   Admission Date: 1/19/2024  Length of Stay: 0 days  Attending Physician: Roxi Joya MD  Primary Care Provider: Najma Rangel MD        Subjective:     Principal Problem:Chest pain        HPI:  Apoorva Fernandez is a 69 y.o. female with PMHx of HTN, HLD, obesity, She presents to the ED with chest pain. For about two weeks now she has had substernal chest pressure with associated dyspnea on exertion. She has had to stop when walking very short distances to catch her breath. The chest pain has radiated to her arms and the left jaw/neck. Chest pain/GORDILLO today not relieved with rest and severe. Felt like there was an elephant siting on her chest. After getting 3 nitroglycerin and aspirin in ED the pain improved and felt more like a baby sitting on her chest. Some associated lightheadedness. Has leg swelling but this is more chronic, especially after stopped her fluid pills.  Denies fever, chills, cough, nausea, vomiting. Her BP has not been very well controlled lately and she has been changing medications with her PCP. States she was taking triamterene HCTZ but stopped recently due to her kidney function. Started losartan but then stopped around 1/10 because of her chest pain that she though may be associated with losartan. Started amlodipine 5 mg 1/18 then stopped due to chest pain. SBP 1/18 recorded 179/67 and 165/72.     In the ED, Initial /85, improved s/p nitroglycerin. Otherwise afebrile and vitals stable. Troponin x2 wnl. BNP 30. D-dimer slightly elevated to 0.75. CBC and CMP unremarkable. EKG NSR, Left axis deviation, RBBB, rate 65. CXR with coarse interstitial attenuation and basilar subsegmental atelectasis bilaterally.  CTA without definite evidence of PE but examination degraded by respiratory motion artifact. She was given aspirin 325 mg,  nitroglycerin x3. Admitted to  for chest pain.    Overview/Hospital Course:  Pt placed in obserrvation for substernal chest pressure and GORDILLO concerning for ACS. Pt HDS on RA. Troponin negative x2, BNP wnl. D dimer elevated. ECG w/ NSR, RBBB, no gross acute ischemia. CXR and CTA chest unremarkable. Pt's pain greatly reduced w/ sublingual nitro and asa. Stress test.    Interval History: NAEON. Pt seen and evaluated at bedside this am. Pt went for stress test today that was negative for ischemia but has short run of VT, discussing with cardiology. Pt currently chest pain free but reporting GORDILLO. Will continue to monitor overnight for BP control, cards input, GORDILLO w/u.    Review of Systems   Constitutional:  Negative for activity change, chills, diaphoresis, fatigue and fever.   HENT:  Negative for congestion, rhinorrhea and sore throat.    Respiratory:  Positive for shortness of breath. Negative for cough, chest tightness and wheezing.    Cardiovascular:  Negative for chest pain, palpitations and leg swelling.   Gastrointestinal:  Negative for abdominal distention, abdominal pain, blood in stool, constipation, diarrhea, nausea and vomiting.   Genitourinary:  Negative for difficulty urinating, dysuria, frequency, hematuria and urgency.   Musculoskeletal:  Negative for arthralgias, back pain and neck stiffness.   Neurological:  Negative for dizziness, tremors, seizures, syncope, weakness, light-headedness, numbness and headaches.   Psychiatric/Behavioral:  Negative for agitation, confusion and hallucinations.      Objective:     Vital Signs (Most Recent):  Temp: 97.9 °F (36.6 °C) (01/20/24 1610)  Pulse: 66 (01/20/24 1610)  Resp: 18 (01/20/24 1610)  BP: (!) 146/66 (01/20/24 1610)  SpO2: 99 % (01/20/24 1610) Vital Signs (24h Range):  Temp:  [97.7 °F (36.5 °C)-98.7 °F (37.1 °C)] 97.9 °F (36.6 °C)  Pulse:  [56-69] 66  Resp:  [15-18] 18  SpO2:  [98 %-100 %] 99 %  BP: (120-192)/(59-85) 146/66     Weight: 103.5 kg (228 lb 4.6  oz)  Body mass index is 44.58 kg/m².  No intake or output data in the 24 hours ending 01/20/24 1619      Physical Exam  Vitals and nursing note reviewed.   Constitutional:       General: She is not in acute distress.     Appearance: She is obese.   HENT:      Head: Normocephalic and atraumatic.      Nose: Nose normal.      Mouth/Throat:      Pharynx: No oropharyngeal exudate.   Eyes:      Extraocular Movements: Extraocular movements intact.      Conjunctiva/sclera: Conjunctivae normal.   Cardiovascular:      Rate and Rhythm: Normal rate and regular rhythm.      Heart sounds: Normal heart sounds.   Pulmonary:      Effort: Pulmonary effort is normal. No respiratory distress.      Breath sounds: Normal breath sounds.   Abdominal:      Palpations: Abdomen is soft.      Tenderness: There is no abdominal tenderness.   Musculoskeletal:      Cervical back: Normal range of motion.      Right lower leg: Edema present.      Left lower leg: Edema present.      Comments: BLE edema that is chronic per pt. No rash observed.   RLE edema > LLE edema chronically   Skin:     General: Skin is warm and dry.   Neurological:      General: No focal deficit present.      Mental Status: She is alert and oriented to person, place, and time.   Psychiatric:         Mood and Affect: Mood normal.         Behavior: Behavior normal.             Significant Labs: All pertinent labs within the past 24 hours have been reviewed.    Significant Imaging: I have reviewed all pertinent imaging results/findings within the past 24 hours.    Assessment/Plan:      * Chest pain  69F with history of HTN, HLD, obesity presenting with new onset of substernal chest pain and GORDILLO x2 two weeks. Radiates to neck /jaw, arms. Chest pain improved with nitroglycerin. HEART score 6.  - Initially hypertensive but improved and vitals otherwise stable  - Troponin x2 normal. BNP wnl   - EKG without acute ischemic change  - D dimer slightly elevated. CTA negative for PE but  limited study.   - S/p aspirin 325 and nitro x3 in ED  - Monitor telemetry   - Stress  Echo reviewed  - DVT US negative  - Aspirin 81 mg daily  - PRN nitroglycerin     BELLA (obstructive sleep apnea)  - CPAP qhs    Essential hypertension  Chronic, uncontrolled initially, better s/p nitroglycerin. Latest blood pressure and vitals reviewed-     Temp:  [97.7 °F (36.5 °C)-98.7 °F (37.1 °C)]   Pulse:  [56-69]   Resp:  [15-18]   BP: (120-192)/(59-85)   SpO2:  [98 %-100 %] .   Home meds for hypertension were reviewed and noted below.   Hypertension Medications               amLODIPine (NORVASC) 5 MG tablet Take 1 tablet (5 mg total) by mouth once daily.            While in the hospital, will manage blood pressure as follows; Adjust home antihypertensive regimen as follows-    Patient reports side effect of amlodipine as chest pain and visual disturbance of flashing colors in her vision transiently. Also reports losartan side effect of chest pain. She reports she was taken off triamterene HCTZ due to her kidney function. Currently not taking any antihypertensives. Will monitor her BP overnight as is is now controlled. Her Cr is 0.8 which appears to be her baseline. It had gone up to 1.2 prior to her medication changes. Consider starting low dose BB in AM after stress test or nifedipine 30 mg daily. Could also consider resuming amlodipine 5 mg daily and monitor for side effects/check orthostatic vitals after administration. Visual changes possibly related to elevated BP (she recorded higher BP on days she was taking amlodipine) or possible orthostatic hypotension? Needs PCP follow up.    Will utilize p.r.n. blood pressure medication only if patient's blood pressure greater than 180/110 and she develops symptoms such as worsening chest pain or shortness of breath.    Morbid obesity with BMI of 45.0-49.9, adult  Body mass index is 44.58 kg/m². Morbid obesity complicates all aspects of disease management from diagnostic  modalities to treatment. Weight loss encouraged and health benefits explained to patient.    Other hyperlipidemia  - Currently diet controlled  - Did not tolerate Lipitor or Zetia  - Last lipid panel 1/3 reviewed        VTE Risk Mitigation (From admission, onward)           Ordered     heparin (porcine) injection 7,500 Units  Every 8 hours         01/19/24 2325     IP VTE HIGH RISK PATIENT  Once         01/19/24 2325     Place sequential compression device  Until discontinued         01/19/24 2325                    Discharge Planning   JULIO: 1/20/2024     Code Status: Full Code   Is the patient medically ready for discharge?: No    Reason for patient still in hospital (select all that apply): Patient trending condition, Laboratory test, and Treatment  Discharge Plan A: Home with family                  Tejal Prather PA-C  Department of Hospital Medicine   [facility] 11H

## 2024-01-20 NOTE — PHARMACY MED REC
"Admission Medication History     The home medication history was taken by Magdalene Landeros.    You may go to "Admission" then "Reconcile Home Medications" tabs to review and/or act upon these items.     The home medication list has been updated by the Pharmacy department.   Please read ALL comments highlighted in yellow.   Please address this information as you see fit.    Feel free to contact us if you have any questions or require assistance.      The medications listed below were removed from the home medication list. Please reorder if appropriate:  Patient reports no longer taking the following medication(s):  AMLODIPINE 5 MG TABLET  DOCUSATE SODIUM 100 MG CAPSULE  FAMOTIDINE 20 MG TABLET  LIDOCAINE 5 % PATCH  FISH OIL 1000 MG CAPSULE  ONDANSETRON 4 MG TABLET    Medications listed below were obtained from: Patient/family and Analytic software- [employer]  Current Outpatient Medications on File Prior to Encounter   Medication Sig    aspirin (ECOTRIN) 81 MG EC tablet   Take 1 tablet (81 mg total) by mouth once daily.    fluticasone propionate (FLONASE) 50 mcg/actuation nasal spray   Instill 2 sprays into each nostril once daily.         Potential issues to be addressed PRIOR TO DISCHARGE  Please discuss with the patient barriers to adherence with medication treatment plans  Patient requires education regarding drug therapies     Magdalene Landeros  EXT [phone]                  .          "

## 2024-01-20 NOTE — PLAN OF CARE
[facility] Emergency Dept  Initial Discharge Assessment       Primary Care Provider: Najma Rangel MD    Admission Diagnosis: Chest pain [R07.9]    Admission Date: 1/19/2024  Expected Discharge Date: 01/20/2024    Sw met pt at bedside. Pt has no acute case management needs at this time.       Kita Schmitt LMSW  Case Management  Emergency Department  [phone]     Transition of Care Barriers: (P) None    Payor: [place] [employer] / Plan: [facility] CHOICE PLUS / Product Type: Commercial /     Extended Emergency Contact Information  Primary Emergency Contact: Feliciano Fernandez  Address: [address]           [place] [address] [facility]  Home Phone: [phone]  Mobile Phone: [phone]  Relation: Spouse    Discharge Plan A: (P) Home with family  Discharge Plan B: (P) Home with family      [id number] [place] - [address]  [address] [place] [address]  Phone: [phone] Fax: [phone]      Initial Assessment (most recent)       Adult Discharge Assessment - 01/19/24 2353          Discharge Assessment    Assessment Type Discharge Planning Assessment (P)      Confirmed/corrected address, phone number and insurance Yes (P)      Confirmed Demographics Correct on Facesheet (P)      Source of Information patient (P)      Does patient/caregiver understand observation status Yes (P)      Communicated JULIO with patient/caregiver Date not available/Unable to determine (P)      People in Home spouse (P)      Do you expect to return to your current living situation? Yes (P)      Do you have help at home or someone to help you manage your care at home? Yes (P)      Who are your caregiver(s) and their phone number(s)? Feliciano Fernandez  [phone] (P)      Prior to hospitilization cognitive status: Alert/Oriented (P)      Current cognitive status: Alert/Oriented (P)      Walking or Climbing Stairs Difficulty no (P)      Dressing/Bathing Difficulty no (P)      Home  Accessibility wheelchair accessible (P)      Equipment Currently Used at Home none (P)      Readmission within 30 days? No (P)      Patient currently being followed by outpatient case management? No (P)      Do you currently have service(s) that help you manage your care at home? No (P)      Do you take prescription medications? No (P)      Do you have prescription coverage? No (P)      Do you have any problems affording any of your prescribed medications? No (P)      Is the patient taking medications as prescribed? yes (P)      Who is going to help you get home at discharge? Feliciano (P)      How do you get to doctors appointments? car, drives self;family or friend will provide (P)      Are you on dialysis? No (P)      Do you take coumadin? No (P)      Discharge Plan A Home with family (P)      Discharge Plan B Home with family (P)      DME Needed Upon Discharge  none (P)      Discharge Plan discussed with: Patient (P)      Transition of Care Barriers None (P)         Physical Activity    On average, how many days per week do you engage in moderate to strenuous exercise (like a brisk walk)? 0 days (P)      On average, how many minutes do you engage in exercise at this level? 0 min (P)         Financial Resource Strain    How hard is it for you to pay for the very basics like food, housing, medical care, and heating? Not hard at all (P)         Housing Stability    In the last 12 months, was there a time when you were not able to pay the mortgage or rent on time? No (P)      In the last 12 months, how many places have you lived? 1 (P)      In the last 12 months, was there a time when you did not have a steady place to sleep or slept in a shelter (including now)? No (P)         Transportation Needs    In the past 12 months, has lack of transportation kept you from medical appointments or from getting medications? No (P)      In the past 12 months, has lack of transportation kept you from meetings, work, or from getting  things needed for daily living? No (P)         Food Insecurity    Within the past 12 months, you worried that your food would run out before you got the money to buy more. Never true (P)      Within the past 12 months, the food you bought just didn't last and you didn't have money to get more. Never true (P)         Stress    Do you feel stress - tense, restless, nervous, or anxious, or unable to sleep at night because your mind is troubled all the time - these days? Not at all (P)         Social Connections    In a typical week, how many times do you talk on the phone with family, friends, or neighbors? More than three times a week (P)      How often do you get together with friends or relatives? More than three times a week (P)      How often do you attend Yazidi or Baptism services? Never (P)      Do you belong to any clubs or organizations such as Yazidi groups, unions, fraternal or athletic groups, or school groups? No (P)      How often do you attend meetings of the clubs or organizations you belong to? Never (P)      Are you , , , , never , or living with a partner?  (P)         Alcohol Use    Q1: How often do you have a drink containing alcohol? Never (P)      Q2: How many drinks containing alcohol do you have on a typical day when you are drinking? Patient does not drink (P)      Q3: How often do you have six or more drinks on one occasion? Never (P)

## 2024-01-20 NOTE — HOSPITAL COURSE
Pt placed in obserrvation for substernal chest pressure and GORDILLO concerning for ACS. Pt HDS on RA. Troponin negative x2, BNP wnl. D dimer elevated. ECG w/ NSR, RBBB, no gross acute ischemia. CXR and CTA chest unremarkable. DVT US negative. Pt's pain greatly reduced w/ sublingual nitro and asa. Stress echo showed EF 65%, normal diastolic function, normal systolic function, ecg portion negative, no evidence of stress induced ischemia. Test was stopped d/t VT/VF and AV block. Cardiology consulted and recommending NM stress test. Results showed  a mild intensity, small sized, reversible perfusion abnormality that is consistent with ischemia in the mid inferolateral wall(s). abnormal myocardial perfusion scan. Cardiology recommended no LHC at this time, medical management w/ sl nitro, statin, BP control. Cardiology f/u in 1 week. Pt med rec'd and medications were delivered to bedside prior to discharge. Pt medically ready for discharge home. Pt educated on hospital course and plan, verbally agrees and understands. All questions answered.

## 2024-01-20 NOTE — ASSESSMENT & PLAN NOTE
Chronic, uncontrolled initially, better s/p nitroglycerin. Latest blood pressure and vitals reviewed-     Temp:  [97.7 °F (36.5 °C)-98.7 °F (37.1 °C)]   Pulse:  [56-69]   Resp:  [15-18]   BP: (120-192)/(59-85)   SpO2:  [98 %-100 %] .   Home meds for hypertension were reviewed and noted below.   Hypertension Medications               amLODIPine (NORVASC) 5 MG tablet Take 1 tablet (5 mg total) by mouth once daily.            While in the hospital, will manage blood pressure as follows; Adjust home antihypertensive regimen as follows-    Patient reports side effect of amlodipine as chest pain and visual disturbance of flashing colors in her vision transiently. Also reports losartan side effect of chest pain. She reports she was taken off triamterene HCTZ due to her kidney function. Currently not taking any antihypertensives. Will monitor her BP overnight as is is now controlled. Her Cr is 0.8 which appears to be her baseline. It had gone up to 1.2 prior to her medication changes. Consider starting low dose BB in AM after stress test or nifedipine 30 mg daily. Could also consider resuming amlodipine 5 mg daily and monitor for side effects/check orthostatic vitals after administration. Visual changes possibly related to elevated BP (she recorded higher BP on days she was taking amlodipine) or possible orthostatic hypotension? Needs PCP follow up.    Will utilize p.r.n. blood pressure medication only if patient's blood pressure greater than 180/110 and she develops symptoms such as worsening chest pain or shortness of breath.

## 2024-01-20 NOTE — ASSESSMENT & PLAN NOTE
Chronic, uncontrolled initially, better s/p nitroglycerin. Latest blood pressure and vitals reviewed-     Temp:  [98 °F (36.7 °C)-98.2 °F (36.8 °C)]   Pulse:  [60-66]   Resp:  [16-18]   BP: (120-188)/(59-85)   SpO2:  [98 %] .   Home meds for hypertension were reviewed and noted below.   Hypertension Medications               amLODIPine (NORVASC) 5 MG tablet Take 1 tablet (5 mg total) by mouth once daily.            While in the hospital, will manage blood pressure as follows; Adjust home antihypertensive regimen as follows-    Patient reports side effect of amlodipine as chest pain and visual disturbance of flashing colors in her vision transiently. Also reports losartan side effect of chest pain. She reports she was taken off triamterene HCTZ due to her kidney function. Currently not taking any antihypertensives. Will monitor her BP overnight as is is now controlled. Her Cr is 0.8 which appears to be her baseline. It had gone up to 1.2 prior to her medication changes. Consider starting low dose BB in AM after stress test or nifedipine 30 mg daily. Could also consider resuming amlodipine 5 mg daily and monitor for side effects/check orthostatic vitals after administration. Visual changes possibly related to elevated BP (she recorded higher BP on days she was taking amlodipine) or possible orthostatic hypotension? Needs PCP follow up.    Will utilize p.r.n. blood pressure medication only if patient's blood pressure greater than 180/110 and she develops symptoms such as worsening chest pain or shortness of breath.

## 2024-01-20 NOTE — ED NOTES
Received report from TYSHAWN Stevenson. NAD. Pt reports CP 3/10 currently. Denies needs at this time. Family at bedside. VSS. Bed locked in lowest position. SR up x 2. Call light within reach.

## 2024-01-20 NOTE — NURSING
Nurses Note -- 4 Eyes      1/20/2024   2:36 PM      Skin assessed during: Admit      [x] No Altered Skin Integrity Present    []Prevention Measures Documented      [] Yes- Altered Skin Integrity Present or Discovered   [] LDA Added if Not in Epic (Describe Wound)   [] New Altered Skin Integrity was Present on Admit and Documented in LDA   [] Wound Image Taken    Wound Care Consulted? No    Attending Nurse:  Andrea Vang RN/Staff Member:   Evangelina

## 2024-01-20 NOTE — ED NOTES
Received report and assumed care of pt from TYSHAWN Alvarez.    The patient is resting quietly in ED stretcher, and is AAOx4 at this time. Respirations are even and unlabored, with no distress noted. The patient remains on continuous cardiac monitor and continuous pulse oximeter. The patient is aware of POC and all questions and concerns addressed at this time. The patient was offered restroom assistance and denies need to void. The patient denies further needs and has no complaints at this time. SR raised x2, bed locked and in low position with brake engaged. Call bell within reach and the patient verbalized she would call for assistance if needed. Personal belongings are at bedside within reach. Patient has a visitor at bedside.

## 2024-01-20 NOTE — ED NOTES
Report given to floor nurse.  RN on floor will tube telmetry back to station 28 when Pt. Arrives.  RN called echo stress to inform of transport- No answer.

## 2024-01-20 NOTE — H&P
[facility] - Emergency Dept  [facility] Medicine  History & Physical    Patient Name: Apoorva Fernandez  MRN: [id number]  Patient Class: OP- Observation  Admission Date: 1/19/2024  Attending Physician: Roxi Joya MD   Primary Care Provider: Najma Rangel MD         Patient information was obtained from patient, spouse/SO, past medical records, and ER records.     Subjective:     Principal Problem:Chest pain    Chief Complaint:   Chief Complaint   Patient presents with    Shortness of Breath     SOB, +chest pain        HPI: Apoorva Fernandez is a 69 y.o. female with PMHx of HTN, HLD, obesity, She presents to the ED with chest pain. For about two weeks now she has had substernal chest pressure with associated dyspnea on exertion. She has had to stop when walking very short distances to catch her breath. The chest pain has radiated to her arms and the left jaw/neck. Chest pain/GORDILLO today not relieved with rest and severe. Felt like there was an elephant siting on her chest. After getting 3 nitroglycerin and aspirin in ED the pain improved and felt more like a baby sitting on her chest. Some associated lightheadedness. Has leg swelling but this is more chronic, especially after stopped her fluid pills.  Denies fever, chills, cough, nausea, vomiting. Her BP has not been very well controlled lately and she has been changing medications with her PCP. States she was taking triamterene HCTZ but stopped recently due to her kidney function. Started losartan but then stopped around 1/10 because of her chest pain that she though may be associated with losartan. Started amlodipine 5 mg 1/18 then stopped due to chest pain. SBP 1/18 recorded 179/67 and 165/72.     In the ED, Initial /85, improved s/p nitroglycerin. Otherwise afebrile and vitals stable. Troponin x2 wnl. BNP 30. D-dimer slightly elevated to 0.75. CBC and CMP unremarkable. EKG NSR, Left axis deviation, RBBB, rate 65. CXR with coarse interstitial  attenuation and basilar subsegmental atelectasis bilaterally.  CTA without definite evidence of PE but examination degraded by respiratory motion artifact. She was given aspirin 325 mg, nitroglycerin x3. Admitted to  for chest pain.    Past Medical History:   Diagnosis Date    Allergic rhinitis     Allergy     Arthritis     Hyperlipidemia     Diet-controlled    Hypertension     Joint pain     Keloid cicatrix     Obesity     Osteoarthritis     Personal history of colonic polyps 2010    Adenomatous    Personal history of peptic ulcer disease     PONV (postoperative nausea and vomiting)     Sleep apnea     Umbilical hernia        Past Surgical History:   Procedure Laterality Date    Abdominal/umbilical hernia repair      removed small part of small bowel; mesh placed     SECTION, LOW TRANSVERSE      X2    CHOLECYSTECTOMY      COLONOSCOPY N/A 3/26/2019    Procedure: COLONOSCOPY;  Surgeon: Chandrika Ho MD;  Location: [facility] ENDO;  Service: Endoscopy;  Laterality: N/A;    Open laparoscopy for a ruptured ulcer      TOTAL KNEE ARTHROPLASTY Right 8/15/2022    Procedure: ARTHROPLASTY, KNEE, TOTAL- JOAN;  Surgeon: Jass Willis MD;  Location: [facility] OR;  Service: Orthopedics;  Laterality: Right;  NAE MIL SALGADO [phone] TEXTED HIM @ 7:06AM  ON 27:06AM ON 2022  1ST BALWINDER DAVIS [phone] TEXTED HER @ 7:16AM ON 2022 REPLIED @ 7:18AM ON 2022  NOON START  SUPINE  RN PREOP 2022    T/S ON 2022       HAS MEDICAL TESHA       Review of patient's allergies indicates:   Allergen Reactions    Antihistamines - alkylamine Shortness Of Breath    Losartan Shortness Of Breath    Atorvastatin      Other reaction(s): myalgia  Other reaction(s): myalgia    Ezetimibe      Other reaction(s): muscle cramps  Other reaction(s): muscle cramps       No current facility-administered medications on file prior to encounter.     Current Outpatient Medications on File Prior to Encounter    Medication Sig    amLODIPine (NORVASC) 5 MG tablet Take 1 tablet (5 mg total) by mouth once daily.    aspirin (ECOTRIN) 81 MG EC tablet Take 1 tablet (81 mg total) by mouth 2 (two) times a day.    docusate sodium (COLACE) 100 MG capsule Take 1 capsule (100 mg total) by mouth 2 (two) times daily as needed for Constipation.    famotidine (PEPCID) 20 MG tablet Take 20 mg by mouth 2 (two) times daily.    fluticasone propionate (FLONASE) 50 mcg/actuation nasal spray SPRAY 2 SPRAYs INTO EACH NOSTRIL ONCE EVERY DAY    lidocaine (LIDODERM) 5 % Place 1 patch onto the skin every 24 hours. Remove & Discard patch within 12 hours or as directed by MD    omega 3-dha-epa-fish oil 1,000 (120-180) mg Cap Take 1 capsule by mouth once daily.    ondansetron (ZOFRAN) 4 MG tablet Take 4 mg by mouth every 8 (eight) hours as needed.     Family History       Problem Relation (Age of Onset)    Breast cancer Mother    Cancer Father    Glaucoma Father    Heart disease Father, Brother    Hypertension Father    Obesity Sister    Sleep apnea Brother          Tobacco Use    Smoking status: Never    Smokeless tobacco: Never   Substance and Sexual Activity    Alcohol use: Yes     Alcohol/week: 2.0 - 3.0 standard drinks of alcohol     Types: 2 - 3 Glasses of wine per week     Comment: Socially on the weekends    Drug use: No    Sexual activity: Yes     Partners: Male     Review of Systems   Constitutional:  Negative for chills and fever.   HENT:  Negative for trouble swallowing.    Eyes:  Positive for visual disturbance (flashing colors for about 1 minute on 1/18).   Respiratory:  Positive for shortness of breath. Negative for cough.    Cardiovascular:  Positive for chest pain and leg swelling (chronic).   Gastrointestinal:  Negative for abdominal pain, nausea and vomiting.   Genitourinary:  Negative for difficulty urinating and dysuria.   Musculoskeletal:  Negative for back pain and gait problem.   Skin:  Positive for rash (transiet red rash on  left lower leg that is no longer present). Negative for wound.   Neurological:  Positive for light-headedness. Negative for headaches.   Psychiatric/Behavioral:  Negative for confusion. The patient is not nervous/anxious.      Objective:     Vital Signs (Most Recent):  Temp: 98 °F (36.7 °C) (01/19/24 2249)  Pulse: 60 (01/19/24 2249)  Resp: 16 (01/19/24 2249)  BP: 134/68 (01/19/24 2249)  SpO2: 98 % (01/19/24 2249) Vital Signs (24h Range):  Temp:  [98 °F (36.7 °C)-98.2 °F (36.8 °C)] 98 °F (36.7 °C)  Pulse:  [60-66] 60  Resp:  [16-18] 16  SpO2:  [98 %] 98 %  BP: (120-188)/(59-85) 134/68     Weight: 105.7 kg (233 lb)  Body mass index is 45.5 kg/m².     Physical Exam  Vitals and nursing note reviewed.   Constitutional:       General: She is not in acute distress.     Appearance: She is obese.   HENT:      Head: Normocephalic and atraumatic.      Nose: Nose normal.      Mouth/Throat:      Pharynx: No oropharyngeal exudate.   Eyes:      Extraocular Movements: Extraocular movements intact.      Conjunctiva/sclera: Conjunctivae normal.   Cardiovascular:      Rate and Rhythm: Normal rate and regular rhythm.      Heart sounds: Normal heart sounds.   Pulmonary:      Effort: Pulmonary effort is normal. No respiratory distress.      Breath sounds: Normal breath sounds.   Abdominal:      Palpations: Abdomen is soft.      Tenderness: There is no abdominal tenderness.   Musculoskeletal:      Cervical back: Normal range of motion.      Right lower leg: Edema present.      Left lower leg: Edema present.      Comments: BLE edema that is chronic per pt. No rash observed.   RLE edema > LLE edema chronically   Skin:     General: Skin is warm and dry.   Neurological:      General: No focal deficit present.      Mental Status: She is alert and oriented to person, place, and time.   Psychiatric:         Mood and Affect: Mood normal.         Behavior: Behavior normal.                Significant Labs: All pertinent labs within the past 24  hours have been reviewed.  BMP:   Recent Labs   Lab 01/19/24  1654   GLU 91      K 3.9      CO2 23   BUN 16   CREATININE 0.8   CALCIUM 9.1     CBC:   Recent Labs   Lab 01/19/24 1654   WBC 5.69   HGB 12.4   HCT 38.3        CMP:   Recent Labs   Lab 01/19/24  1654      K 3.9      CO2 23   GLU 91   BUN 16   CREATININE 0.8   CALCIUM 9.1   PROT 7.5   ALBUMIN 3.6   BILITOT 0.9   ALKPHOS 81   AST 16   ALT 18   ANIONGAP 6*     Cardiac Markers:   Recent Labs   Lab 01/19/24 1654   BNP 30     Troponin:   Recent Labs   Lab 01/19/24 1654 01/19/24 2005   TROPONINI <0.006 0.006       Significant Imaging: I have reviewed all pertinent imaging results/findings within the past 24 hours.  CTA Chest Non-Coronary (PE Studies)  Narrative: EXAMINATION:  CTA CHEST NON CORONARY (PE STUDIES)    CLINICAL HISTORY:  Pulmonary embolism (PE) suspected, positive D-dimer;    TECHNIQUE:  Low dose axial images, sagittal and coronal reformations were obtained from the thoracic inlet to the lung bases following the IV administration of 100 mL of Omnipaque 350.  Contrast timing was optimized to evaluate the pulmonary arteries.    COMPARISON:  XR chest from same date.    FINDINGS:  SOFT TISSUES: No axillary or subpectoral lymphadenopathy. The visualized thyroid gland is unremarkable.  The left pectoralis muscle appears absent.    HEART AND MEDIASTINUM: Heart and pericardium are within normal limits. No calcifications of the coronary arteries.  No mediastinal or hilar lymphadenopathy.    AORTA: Left-sided aortic arch.  No aneurysm. No significant calcified atherosclerosis of the aortic arch.    PULMONARY VASCULATURE: Pulmonary arteries distribute normally. Pulmonary arteries are brightly opacified.  There is respiratory motion artifact which limits evaluation of the pulmonary vasculature.  No definite intraluminal filling defects to suggest pulmonary thromboembolism.    LUNGS AND AIRWAYS: Evaluation of the lungs is  degraded by respiratory motion.  Central airways are patent.  Scattered bandlike opacities likely representing subsegmental atelectasis and/or scarring.  No focal consolidation.  No pleural effusion.    UPPER ABDOMEN: No acute intra-abdominal abnormality.    BONES: Mild degenerative changes.  No acute fracture or aggressive osseous lesion.  Impression: Examination degraded by respiratory motion artifact.    No definite evidence of pulmonary thromboembolism.    Other findings above.    Electronically signed by resident: Lamin Perales  Date:    01/19/2024  Time:    21:38    Electronically signed by: Reji Piper  Date:    01/19/2024  Time:    22:15  X-Ray Chest PA And Lateral  Narrative: EXAMINATION:  XR CHEST PA AND LATERAL    CLINICAL HISTORY:  Shortness of breath    TECHNIQUE:  PA and lateral views of the chest were performed.    COMPARISON:  06/12/2023    FINDINGS:  The cardiomediastinal silhouette is prominent, magnified by technique, stable..  There is no pleural effusion.  The trachea is midline.  The lungs are symmetrically expanded bilaterally with coarse interstitial attenuation bilaterally.  There is bilateral basilar subsegmental atelectasis..  No large focal consolidation seen.  There is no pneumothorax.  The osseous structures are remarkable for degenerative change..  Impression: 1. Interstitial findings may reflect edema noting accentuation by shallow inspiratory effort.  No large focal consolidation.    Electronically signed by: Quinton Lemons MD  Date:    01/19/2024  Time:    18:09       Assessment/Plan:     * Chest pain  69F with history of HTN, HLD, obesity presenting with new onset of substernal chest pain and GORDILLO x2 two weeks. Radiates to neck /jaw, arms. Chest pain improved with nitroglycerin. HEART score 6.  - Initially hypertensive but improved and vitals otherwise stable  - Troponin x2 normal. BNP wnl   - EKG without acute ischemic change  - D dimer slightly elevated. CTA negative for PE but  limited study.   - S/p aspirin 325 and nitro x3 in ED  - Monitor telemetry   - Check Stress  Echo  - Check DVT US   - NPO at MN  - Aspirin 81 mg daily  - PRN nitroglycerin     BELLA (obstructive sleep apnea)  - CPAP qhs      Essential hypertension  Chronic, uncontrolled initially, better s/p nitroglycerin. Latest blood pressure and vitals reviewed-     Temp:  [98 °F (36.7 °C)-98.2 °F (36.8 °C)]   Pulse:  [60-66]   Resp:  [16-18]   BP: (120-188)/(59-85)   SpO2:  [98 %] .   Home meds for hypertension were reviewed and noted below.   Hypertension Medications               amLODIPine (NORVASC) 5 MG tablet Take 1 tablet (5 mg total) by mouth once daily.            While in the hospital, will manage blood pressure as follows; Adjust home antihypertensive regimen as follows-    Patient reports side effect of amlodipine as chest pain and visual disturbance of flashing colors in her vision transiently. Also reports losartan side effect of chest pain. She reports she was taken off triamterene HCTZ due to her kidney function. Currently not taking any antihypertensives. Will monitor her BP overnight as is is now controlled. Her Cr is 0.8 which appears to be her baseline. It had gone up to 1.2 prior to her medication changes. Consider starting low dose BB in AM after stress test or nifedipine 30 mg daily. Could also consider resuming amlodipine 5 mg daily and monitor for side effects/check orthostatic vitals after administration. Visual changes possibly related to elevated BP (she recorded higher BP on days she was taking amlodipine) or possible orthostatic hypotension? Needs PCP follow up.    Will utilize p.r.n. blood pressure medication only if patient's blood pressure greater than 180/110 and she develops symptoms such as worsening chest pain or shortness of breath.    Morbid obesity with BMI of 45.0-49.9, adult  Body mass index is 45.5 kg/m². Morbid obesity complicates all aspects of disease management from diagnostic  modalities to treatment. Weight loss encouraged and health benefits explained to patient.    Other hyperlipidemia  - Currently diet controlled  - Did not tolerate Lipitor or Zetia  - Last lipid panel 1/3 reviewed        VTE Risk Mitigation (From admission, onward)           Ordered     heparin (porcine) injection 7,500 Units  Every 8 hours         01/19/24 2325     IP VTE HIGH RISK PATIENT  Once         01/19/24 2325     Place sequential compression device  Until discontinued         01/19/24 2325                         On 01/20/2024, patient should be placed in hospital observation services under my care in collaboration with Ersi Sorensen MD.           Kiya Duarte PA-C  Department of Hospital Medicine  [facility] - Emergency Dept

## 2024-01-21 LAB
ANION GAP SERPL CALC-SCNC: 7 MMOL/L (ref 8–16)
BUN SERPL-MCNC: 16 MG/DL (ref 8–23)
CALCIUM SERPL-MCNC: 8.8 MG/DL (ref 8.7–10.5)
CHLORIDE SERPL-SCNC: 105 MMOL/L (ref 95–110)
CO2 SERPL-SCNC: 23 MMOL/L (ref 23–29)
CREAT SERPL-MCNC: 0.8 MG/DL (ref 0.5–1.4)
EST. GFR  (NO RACE VARIABLE): >60 ML/MIN/1.73 M^2
GLUCOSE SERPL-MCNC: 91 MG/DL (ref 70–110)
POTASSIUM SERPL-SCNC: 4.1 MMOL/L (ref 3.5–5.1)
SODIUM SERPL-SCNC: 135 MMOL/L (ref 136–145)

## 2024-01-21 PROCEDURE — 96372 THER/PROPH/DIAG INJ SC/IM: CPT

## 2024-01-21 PROCEDURE — 93010 ELECTROCARDIOGRAM REPORT: CPT | Mod: ,,, | Performed by: INTERNAL MEDICINE

## 2024-01-21 PROCEDURE — 36415 COLL VENOUS BLD VENIPUNCTURE: CPT | Performed by: PHYSICIAN ASSISTANT

## 2024-01-21 PROCEDURE — 25000003 PHARM REV CODE 250

## 2024-01-21 PROCEDURE — G0378 HOSPITAL OBSERVATION PER HR: HCPCS

## 2024-01-21 PROCEDURE — 93005 ELECTROCARDIOGRAM TRACING: CPT

## 2024-01-21 PROCEDURE — 63600175 PHARM REV CODE 636 W HCPCS

## 2024-01-21 PROCEDURE — 80048 BASIC METABOLIC PNL TOTAL CA: CPT | Performed by: PHYSICIAN ASSISTANT

## 2024-01-21 RX ADMIN — HEPARIN SODIUM 7500 UNITS: 5000 INJECTION INTRAVENOUS; SUBCUTANEOUS at 04:01

## 2024-01-21 RX ADMIN — HEPARIN SODIUM 7500 UNITS: 5000 INJECTION INTRAVENOUS; SUBCUTANEOUS at 05:01

## 2024-01-21 RX ADMIN — ASPIRIN 81 MG CHEWABLE TABLET 81 MG: 81 TABLET CHEWABLE at 08:01

## 2024-01-21 NOTE — PROGRESS NOTES
[facility] - Observation [address] [place] [address] Medicine  Progress Note    Patient Name: Apoorva Fernandez  MRN: [id number]  Patient Class: OP- Observation   Admission Date: 1/19/2024  Length of Stay: 0 days  Attending Physician: Roxi Joya MD  Primary Care Provider: Najma Rangel MD        Subjective:     Principal Problem:Chest pain        HPI:  Apoorva Fernandez is a 69 y.o. female with PMHx of HTN, HLD, obesity, She presents to the ED with chest pain. For about two weeks now she has had substernal chest pressure with associated dyspnea on exertion. She has had to stop when walking very short distances to catch her breath. The chest pain has radiated to her arms and the left jaw/neck. Chest pain/GORDILLO today not relieved with rest and severe. Felt like there was an elephant siting on her chest. After getting 3 nitroglycerin and aspirin in ED the pain improved and felt more like a baby sitting on her chest. Some associated lightheadedness. Has leg swelling but this is more chronic, especially after stopped her fluid pills.  Denies fever, chills, cough, nausea, vomiting. Her BP has not been very well controlled lately and she has been changing medications with her PCP. States she was taking triamterene HCTZ but stopped recently due to her kidney function. Started losartan but then stopped around 1/10 because of her chest pain that she though may be associated with losartan. Started amlodipine 5 mg 1/18 then stopped due to chest pain. SBP 1/18 recorded 179/67 and 165/72.     In the ED, Initial /85, improved s/p nitroglycerin. Otherwise afebrile and vitals stable. Troponin x2 wnl. BNP 30. D-dimer slightly elevated to 0.75. CBC and CMP unremarkable. EKG NSR, Left axis deviation, RBBB, rate 65. CXR with coarse interstitial attenuation and basilar subsegmental atelectasis bilaterally.  CTA without definite evidence of PE but examination degraded by respiratory motion artifact. She was given aspirin 325 mg,  nitroglycerin x3. Admitted to  for chest pain.    Overview/Hospital Course:  Pt placed in obserrvation for substernal chest pressure and GORDILLO concerning for ACS. Pt HDS on RA. Troponin negative x2, BNP wnl. D dimer elevated. ECG w/ NSR, RBBB, no gross acute ischemia. CXR and CTA chest unremarkable. DVT US negative. Pt's pain greatly reduced w/ sublingual nitro and asa. Stress echo showed EF 65%, normal diastolic function, normal systolic function, ecg portion negative, no evidence of stress induced ischemia. Test was stopped d/t VT/VF and AV block. Cardiology consulted    Interval History: Pt seen and evaluated at bedside this am. Pt is doing well this am. Reports an episode of palpitations last night while walking to the bathroom, tele reviewed and no correlating arrhythmias seen. Today pt reports having chest pressure while ambulating to the restroom along with SOB, will obtain ECG and cardiology consulted.    Review of Systems   Constitutional:  Negative for activity change, chills, diaphoresis, fatigue and fever.   HENT:  Negative for congestion, rhinorrhea and sore throat.    Respiratory:  Positive for shortness of breath. Negative for cough, chest tightness and wheezing.    Cardiovascular:  Positive for chest pain. Negative for palpitations and leg swelling.   Gastrointestinal:  Negative for abdominal distention, abdominal pain, blood in stool, constipation, diarrhea, nausea and vomiting.   Genitourinary:  Negative for difficulty urinating, dysuria, frequency, hematuria and urgency.   Musculoskeletal:  Negative for arthralgias, back pain and neck stiffness.   Neurological:  Negative for dizziness, tremors, seizures, syncope, weakness, light-headedness, numbness and headaches.   Psychiatric/Behavioral:  Negative for agitation, confusion and hallucinations.      Objective:     Vital Signs (Most Recent):  Temp: 97.8 °F (36.6 °C) (01/21/24 1142)  Pulse: 77 (01/21/24 1520)  Resp: 18 (01/21/24 1142)  BP: (!) 148/68  (01/21/24 1142)  SpO2: 96 % (01/21/24 1142) Vital Signs (24h Range):  Temp:  [96.8 °F (36 °C)-97.9 °F (36.6 °C)] 97.8 °F (36.6 °C)  Pulse:  [54-77] 77  Resp:  [18] 18  SpO2:  [96 %-99 %] 96 %  BP: (134-148)/(60-68) 148/68     Weight: 103.5 kg (228 lb 4.6 oz)  Body mass index is 44.58 kg/m².  No intake or output data in the 24 hours ending 01/21/24 1531      Physical Exam  Vitals and nursing note reviewed.   Constitutional:       General: She is not in acute distress.     Appearance: She is obese.   HENT:      Head: Normocephalic and atraumatic.      Nose: Nose normal.      Mouth/Throat:      Pharynx: No oropharyngeal exudate.   Eyes:      Extraocular Movements: Extraocular movements intact.      Conjunctiva/sclera: Conjunctivae normal.   Cardiovascular:      Rate and Rhythm: Normal rate and regular rhythm.      Heart sounds: Normal heart sounds.   Pulmonary:      Effort: Pulmonary effort is normal. No respiratory distress.      Breath sounds: Normal breath sounds.   Abdominal:      Palpations: Abdomen is soft.      Tenderness: There is no abdominal tenderness.   Musculoskeletal:      Cervical back: Normal range of motion.      Right lower leg: Edema present.      Left lower leg: Edema present.      Comments: BLE edema that is chronic per pt. No rash observed.   RLE edema > LLE edema chronically   Skin:     General: Skin is warm and dry.   Neurological:      General: No focal deficit present.      Mental Status: She is alert and oriented to person, place, and time.   Psychiatric:         Mood and Affect: Mood normal.         Behavior: Behavior normal.             Significant Labs: All pertinent labs within the past 24 hours have been reviewed.    Significant Imaging: I have reviewed all pertinent imaging results/findings within the past 24 hours.    Assessment/Plan:      * Chest pain  69F with history of HTN, HLD, obesity presenting with new onset of substernal chest pain and GORDILLO x2 two weeks. Radiates to neck /jaw,  arms. Chest pain improved with nitroglycerin. HEART score 6.  - Initially hypertensive but improved and vitals otherwise stable  - Troponin x2 normal. BNP wnl   - EKG without acute ischemic change  - D dimer slightly elevated. CTA negative for PE but limited study.   - S/p aspirin 325 and nitro x3 in ED  - Monitor telemetry   - Stress  Echo reviewed  - DVT US negative  - Aspirin 81 mg daily  - PRN nitroglycerin     BELLA (obstructive sleep apnea)  - CPAP qhs    Essential hypertension  Chronic, uncontrolled initially, better s/p nitroglycerin. Latest blood pressure and vitals reviewed-     Temp:  [96.8 °F (36 °C)-97.9 °F (36.6 °C)]   Pulse:  [54-77]   Resp:  [18]   BP: (134-148)/(60-68)   SpO2:  [96 %-99 %] .   Home meds for hypertension were reviewed and noted below.   Hypertension Medications               amLODIPine (NORVASC) 5 MG tablet Take 1 tablet (5 mg total) by mouth once daily.            While in the hospital, will manage blood pressure as follows; Adjust home antihypertensive regimen as follows-    Patient reports side effect of amlodipine as chest pain and visual disturbance of flashing colors in her vision transiently. Also reports losartan side effect of chest pain. She reports she was taken off triamterene HCTZ due to her kidney function. Currently not taking any antihypertensives. Will monitor her BP overnight as is is now controlled. Her Cr is 0.8 which appears to be her baseline. It had gone up to 1.2 prior to her medication changes. Consider starting low dose BB in AM after stress test or nifedipine 30 mg daily. Could also consider resuming amlodipine 5 mg daily and monitor for side effects/check orthostatic vitals after administration. Visual changes possibly related to elevated BP (she recorded higher BP on days she was taking amlodipine) or possible orthostatic hypotension? Needs PCP follow up.    Will utilize p.r.n. blood pressure medication only if patient's blood pressure greater than 180/110  and she develops symptoms such as worsening chest pain or shortness of breath.    Morbid obesity with BMI of 45.0-49.9, adult  Body mass index is 44.58 kg/m². Morbid obesity complicates all aspects of disease management from diagnostic modalities to treatment. Weight loss encouraged and health benefits explained to patient.    Other hyperlipidemia  - Currently diet controlled  - Did not tolerate Lipitor or Zetia  - Last lipid panel 1/3 reviewed        VTE Risk Mitigation (From admission, onward)           Ordered     heparin (porcine) injection 7,500 Units  Every 8 hours         01/19/24 2325     IP VTE HIGH RISK PATIENT  Once         01/19/24 2325     Place sequential compression device  Until discontinued         01/19/24 2325                    Discharge Planning   JULIO: 1/21/2024     Code Status: Full Code   Is the patient medically ready for discharge?: Yes    Reason for patient still in hospital (select all that apply): Patient trending condition, Laboratory test, Treatment, and Consult recommendations  Discharge Plan A: Home with family                  Tejal Prather PA-C  Department of Hospital Medicine   [facility] - Observation 11H

## 2024-01-21 NOTE — PLAN OF CARE
Problem: Adult Inpatient Plan of Care  Goal: Plan of Care Review  Outcome: Ongoing, Progressing  Goal: Patient-Specific Goal (Individualized)  Outcome: Ongoing, Progressing  Goal: Absence of Hospital-Acquired Illness or Injury  Outcome: Ongoing, Progressing  Goal: Optimal Comfort and Wellbeing  Outcome: Ongoing, Progressing  Goal: Readiness for Transition of Care  Outcome: Ongoing, Progressing     Problem: Bariatric Environmental Safety  Goal: Safety Maintained with Care  Outcome: Ongoing, Progressing     Problem: Infection  Goal: Absence of Infection Signs and Symptoms  Outcome: Ongoing, Progressing     Problem: Chest Pain  Goal: Resolution of Chest Pain Symptoms  Outcome: Ongoing, Progressing     Problem: Hypertension Comorbidity  Goal: Blood Pressure in Desired Range  Outcome: Ongoing, Progressing     Problem: Obstructive Sleep Apnea Risk or Actual Comorbidity Management  Goal: Unobstructed Breathing During Sleep  Outcome: Ongoing, Progressing

## 2024-01-21 NOTE — SUBJECTIVE & OBJECTIVE
Interval History: Pt seen and evaluated at bedside this am. Pt is doing well this am. Reports an episode of palpitations last night while walking to the bathroom, tele reviewed and no correlating arrhythmias seen. Today pt reports having chest pressure while ambulating to the restroom along with SOB, will obtain ECG and cardiology consulted.    Review of Systems   Constitutional:  Negative for activity change, chills, diaphoresis, fatigue and fever.   HENT:  Negative for congestion, rhinorrhea and sore throat.    Respiratory:  Positive for shortness of breath. Negative for cough, chest tightness and wheezing.    Cardiovascular:  Positive for chest pain. Negative for palpitations and leg swelling.   Gastrointestinal:  Negative for abdominal distention, abdominal pain, blood in stool, constipation, diarrhea, nausea and vomiting.   Genitourinary:  Negative for difficulty urinating, dysuria, frequency, hematuria and urgency.   Musculoskeletal:  Negative for arthralgias, back pain and neck stiffness.   Neurological:  Negative for dizziness, tremors, seizures, syncope, weakness, light-headedness, numbness and headaches.   Psychiatric/Behavioral:  Negative for agitation, confusion and hallucinations.      Objective:     Vital Signs (Most Recent):  Temp: 97.8 °F (36.6 °C) (01/21/24 1142)  Pulse: 77 (01/21/24 1520)  Resp: 18 (01/21/24 1142)  BP: (!) 148/68 (01/21/24 1142)  SpO2: 96 % (01/21/24 1142) Vital Signs (24h Range):  Temp:  [96.8 °F (36 °C)-97.9 °F (36.6 °C)] 97.8 °F (36.6 °C)  Pulse:  [54-77] 77  Resp:  [18] 18  SpO2:  [96 %-99 %] 96 %  BP: (134-148)/(60-68) 148/68     Weight: 103.5 kg (228 lb 4.6 oz)  Body mass index is 44.58 kg/m².  No intake or output data in the 24 hours ending 01/21/24 1531      Physical Exam  Vitals and nursing note reviewed.   Constitutional:       General: She is not in acute distress.     Appearance: She is obese.   HENT:      Head: Normocephalic and atraumatic.      Nose: Nose normal.       Mouth/Throat:      Pharynx: No oropharyngeal exudate.   Eyes:      Extraocular Movements: Extraocular movements intact.      Conjunctiva/sclera: Conjunctivae normal.   Cardiovascular:      Rate and Rhythm: Normal rate and regular rhythm.      Heart sounds: Normal heart sounds.   Pulmonary:      Effort: Pulmonary effort is normal. No respiratory distress.      Breath sounds: Normal breath sounds.   Abdominal:      Palpations: Abdomen is soft.      Tenderness: There is no abdominal tenderness.   Musculoskeletal:      Cervical back: Normal range of motion.      Right lower leg: Edema present.      Left lower leg: Edema present.      Comments: BLE edema that is chronic per pt. No rash observed.   RLE edema > LLE edema chronically   Skin:     General: Skin is warm and dry.   Neurological:      General: No focal deficit present.      Mental Status: She is alert and oriented to person, place, and time.   Psychiatric:         Mood and Affect: Mood normal.         Behavior: Behavior normal.             Significant Labs: All pertinent labs within the past 24 hours have been reviewed.    Significant Imaging: I have reviewed all pertinent imaging results/findings within the past 24 hours.

## 2024-01-21 NOTE — ASSESSMENT & PLAN NOTE
Chronic, uncontrolled initially, better s/p nitroglycerin. Latest blood pressure and vitals reviewed-     Temp:  [96.8 °F (36 °C)-97.9 °F (36.6 °C)]   Pulse:  [54-77]   Resp:  [18]   BP: (134-148)/(60-68)   SpO2:  [96 %-99 %] .   Home meds for hypertension were reviewed and noted below.   Hypertension Medications               amLODIPine (NORVASC) 5 MG tablet Take 1 tablet (5 mg total) by mouth once daily.            While in the hospital, will manage blood pressure as follows; Adjust home antihypertensive regimen as follows-    Patient reports side effect of amlodipine as chest pain and visual disturbance of flashing colors in her vision transiently. Also reports losartan side effect of chest pain. She reports she was taken off triamterene HCTZ due to her kidney function. Currently not taking any antihypertensives. Will monitor her BP overnight as is is now controlled. Her Cr is 0.8 which appears to be her baseline. It had gone up to 1.2 prior to her medication changes. Consider starting low dose BB in AM after stress test or nifedipine 30 mg daily. Could also consider resuming amlodipine 5 mg daily and monitor for side effects/check orthostatic vitals after administration. Visual changes possibly related to elevated BP (she recorded higher BP on days she was taking amlodipine) or possible orthostatic hypotension? Needs PCP follow up.    Will utilize p.r.n. blood pressure medication only if patient's blood pressure greater than 180/110 and she develops symptoms such as worsening chest pain or shortness of breath.

## 2024-01-21 NOTE — PLAN OF CARE
Problem: Adult Inpatient Plan of Care  Goal: Plan of Care Review  Outcome: Ongoing, Progressing     Problem: Adult Inpatient Plan of Care  Goal: Optimal Comfort and Wellbeing  Outcome: Ongoing, Progressing     Problem: Infection  Goal: Absence of Infection Signs and Symptoms  Outcome: Ongoing, Progressing     Problem: Chest Pain  Goal: Resolution of Chest Pain Symptoms  Outcome: Ongoing, Progressing

## 2024-01-22 LAB
ANION GAP SERPL CALC-SCNC: 9 MMOL/L (ref 8–16)
BUN SERPL-MCNC: 16 MG/DL (ref 8–23)
CALCIUM SERPL-MCNC: 8.6 MG/DL (ref 8.7–10.5)
CHLORIDE SERPL-SCNC: 109 MMOL/L (ref 95–110)
CO2 SERPL-SCNC: 21 MMOL/L (ref 23–29)
CREAT SERPL-MCNC: 1 MG/DL (ref 0.5–1.4)
CV STRESS BASE HR: 60 BPM
DIASTOLIC BLOOD PRESSURE: 90 MMHG
EJECTION FRACTION- HIGH: 59 %
END DIASTOLIC INDEX-HIGH: 155 ML/M2
END DIASTOLIC INDEX-LOW: 91 ML/M2
END SYSTOLIC INDEX-HIGH: 78 ML/M2
END SYSTOLIC INDEX-LOW: 40 ML/M2
EST. GFR  (NO RACE VARIABLE): >60 ML/MIN/1.73 M^2
GLUCOSE SERPL-MCNC: 101 MG/DL (ref 70–110)
NUC REST DIASTOLIC VOLUME INDEX: 68
NUC REST EJECTION FRACTION: 64
NUC REST SYSTOLIC VOLUME INDEX: 25
NUC STRESS DIASTOLIC VOLUME INDEX: 72
NUC STRESS EJECTION FRACTION: 66 %
NUC STRESS SYSTOLIC VOLUME INDEX: 24
OHS CV CPX 1 MINUTE RECOVERY HEART RATE: 73 BPM
OHS CV CPX 85 PERCENT MAX PREDICTED HEART RATE MALE: 128
OHS CV CPX MAX PREDICTED HEART RATE: 150
OHS CV CPX PATIENT IS FEMALE: 1
OHS CV CPX PATIENT IS MALE: 0
OHS CV CPX PEAK DIASTOLIC BLOOD PRESSURE: 94 MMHG
OHS CV CPX PEAK HEAR RATE: 59 BPM
OHS CV CPX PEAK RATE PRESSURE PRODUCT: 9027
OHS CV CPX PEAK SYSTOLIC BLOOD PRESSURE: 153 MMHG
OHS CV CPX PERCENT MAX PREDICTED HEART RATE ACHIEVED: 41
OHS CV CPX RATE PRESSURE PRODUCT PRESENTING: 9360
POTASSIUM SERPL-SCNC: 3.7 MMOL/L (ref 3.5–5.1)
RETIRED EF AND QEF - SEE NOTES: 47 %
SODIUM SERPL-SCNC: 139 MMOL/L (ref 136–145)
SYSTOLIC BLOOD PRESSURE: 156 MMHG

## 2024-01-22 PROCEDURE — 63600175 PHARM REV CODE 636 W HCPCS

## 2024-01-22 PROCEDURE — 80048 BASIC METABOLIC PNL TOTAL CA: CPT | Performed by: PHYSICIAN ASSISTANT

## 2024-01-22 PROCEDURE — 99213 OFFICE O/P EST LOW 20 MIN: CPT | Mod: ,,, | Performed by: INTERNAL MEDICINE

## 2024-01-22 PROCEDURE — 63600175 PHARM REV CODE 636 W HCPCS: Performed by: STUDENT IN AN ORGANIZED HEALTH CARE EDUCATION/TRAINING PROGRAM

## 2024-01-22 PROCEDURE — G0378 HOSPITAL OBSERVATION PER HR: HCPCS

## 2024-01-22 PROCEDURE — 96372 THER/PROPH/DIAG INJ SC/IM: CPT

## 2024-01-22 PROCEDURE — 25000003 PHARM REV CODE 250

## 2024-01-22 PROCEDURE — 36415 COLL VENOUS BLD VENIPUNCTURE: CPT | Performed by: PHYSICIAN ASSISTANT

## 2024-01-22 RX ORDER — AMINOPHYLLINE 25 MG/ML
75 INJECTION, SOLUTION INTRAVENOUS ONCE
Status: COMPLETED | OUTPATIENT
Start: 2024-01-22 | End: 2024-01-22

## 2024-01-22 RX ORDER — REGADENOSON 0.08 MG/ML
0.4 INJECTION, SOLUTION INTRAVENOUS ONCE
Status: COMPLETED | OUTPATIENT
Start: 2024-01-22 | End: 2024-01-22

## 2024-01-22 RX ADMIN — REGADENOSON 0.4 MG: 0.08 INJECTION, SOLUTION INTRAVENOUS at 10:01

## 2024-01-22 RX ADMIN — ACETAMINOPHEN 650 MG: 325 TABLET ORAL at 10:01

## 2024-01-22 RX ADMIN — AMINOPHYLLINE 75 MG: 25 INJECTION, SOLUTION INTRAVENOUS at 10:01

## 2024-01-22 RX ADMIN — HEPARIN SODIUM 7500 UNITS: 5000 INJECTION INTRAVENOUS; SUBCUTANEOUS at 10:01

## 2024-01-22 RX ADMIN — HEPARIN SODIUM 7500 UNITS: 5000 INJECTION INTRAVENOUS; SUBCUTANEOUS at 02:01

## 2024-01-22 RX ADMIN — HEPARIN SODIUM 7500 UNITS: 5000 INJECTION INTRAVENOUS; SUBCUTANEOUS at 12:01

## 2024-01-22 NOTE — ASSESSMENT & PLAN NOTE
Body mass index is 44.53 kg/m². Morbid obesity complicates all aspects of disease management from diagnostic modalities to treatment. Weight loss encouraged and health benefits explained to patient.

## 2024-01-22 NOTE — PLAN OF CARE
Problem: Adult Inpatient Plan of Care  Goal: Plan of Care Review  Outcome: Ongoing, Progressing  Goal: Patient-Specific Goal (Individualized)  Outcome: Ongoing, Progressing  Goal: Absence of Hospital-Acquired Illness or Injury  Outcome: Ongoing, Progressing     Problem: Bariatric Environmental Safety  Goal: Safety Maintained with Care  Outcome: Ongoing, Progressing     Problem: Infection  Goal: Absence of Infection Signs and Symptoms  Outcome: Ongoing, Progressing     Problem: Chest Pain  Goal: Resolution of Chest Pain Symptoms  Outcome: Ongoing, Progressing     Problem: Hypertension Comorbidity  Goal: Blood Pressure in Desired Range  Outcome: Ongoing, Progressing     Problem: Obstructive Sleep Apnea Risk or Actual Comorbidity Management  Goal: Unobstructed Breathing During Sleep  Outcome: Ongoing, Progressing

## 2024-01-22 NOTE — ASSESSMENT & PLAN NOTE
Chronic, uncontrolled initially, better s/p nitroglycerin. Latest blood pressure and vitals reviewed-     Temp:  [97.4 °F (36.3 °C)-98.6 °F (37 °C)]   Pulse:  [58-70]   Resp:  [16-20]   BP: (129-158)/(59-94)   SpO2:  [92 %-99 %] .   Home meds for hypertension were reviewed and noted below.   Hypertension Medications               amLODIPine (NORVASC) 5 MG tablet Take 1 tablet (5 mg total) by mouth once daily.            While in the hospital, will manage blood pressure as follows; Adjust home antihypertensive regimen as follows-    Patient reports side effect of amlodipine as chest pain and visual disturbance of flashing colors in her vision transiently. Also reports losartan side effect of chest pain. She reports she was taken off triamterene HCTZ due to her kidney function. Currently not taking any antihypertensives. Will monitor her BP overnight as is is now controlled. Her Cr is 0.8 which appears to be her baseline. It had gone up to 1.2 prior to her medication changes. Consider starting low dose BB in AM after stress test or nifedipine 30 mg daily. Could also consider resuming amlodipine 5 mg daily and monitor for side effects/check orthostatic vitals after administration. Visual changes possibly related to elevated BP (she recorded higher BP on days she was taking amlodipine) or possible orthostatic hypotension? Needs PCP follow up.    Will utilize p.r.n. blood pressure medication only if patient's blood pressure greater than 180/110 and she develops symptoms such as worsening chest pain or shortness of breath.

## 2024-01-22 NOTE — ASSESSMENT & PLAN NOTE
- Unable to tolerate statin/Zetia in the past, however needs cardiac risk factor modification    Recommendations  - CoEnzyme Q10 200mg Daily for 2 weeks  - Start Pravastatin 40mg QHS AFTER finished with 2 weeks of CoQ10 (least likely agent to cause muscle cramping)  - Check Vitamin D level as low Vitamin D can contribute to statin-associated myopathy

## 2024-01-22 NOTE — SUBJECTIVE & OBJECTIVE
Interval History: NAEON. Pt seen and evaluated at bedside this am. Pt is doing well and had been NPO since midnight for NM stress test. Stress test today was positive, Pt currently NPO for potential intervention by cardiology.    Review of Systems   Constitutional:  Negative for activity change, chills, diaphoresis, fatigue and fever.   HENT:  Negative for congestion, rhinorrhea and sore throat.    Respiratory:  Positive for shortness of breath. Negative for cough, chest tightness and wheezing.    Cardiovascular:  Negative for chest pain, palpitations and leg swelling.   Gastrointestinal:  Negative for abdominal distention, abdominal pain, blood in stool, constipation, diarrhea, nausea and vomiting.   Genitourinary:  Negative for difficulty urinating, dysuria, frequency, hematuria and urgency.   Musculoskeletal:  Negative for arthralgias, back pain and neck stiffness.   Neurological:  Negative for dizziness, tremors, seizures, syncope, weakness, light-headedness, numbness and headaches.   Psychiatric/Behavioral:  Negative for agitation, confusion and hallucinations.      Objective:     Vital Signs (Most Recent):  Temp: 98.6 °F (37 °C) (01/22/24 1227)  Pulse: 63 (01/22/24 1455)  Resp: 20 (01/22/24 1227)  BP: (!) 158/68 (01/22/24 1227)  SpO2: 98 % (01/22/24 1455) Vital Signs (24h Range):  Temp:  [97.4 °F (36.3 °C)-98.6 °F (37 °C)] 98.6 °F (37 °C)  Pulse:  [58-70] 63  Resp:  [16-20] 20  SpO2:  [92 %-99 %] 98 %  BP: (129-158)/(59-94) 158/68     Weight: 103.4 kg (228 lb)  Body mass index is 44.53 kg/m².    Intake/Output Summary (Last 24 hours) at 1/22/2024 1531  Last data filed at 1/22/2024 0030  Gross per 24 hour   Intake 500 ml   Output --   Net 500 ml         Physical Exam  Vitals and nursing note reviewed.   Constitutional:       General: She is not in acute distress.     Appearance: She is obese.   HENT:      Head: Normocephalic and atraumatic.      Nose: Nose normal.      Mouth/Throat:      Pharynx: No  oropharyngeal exudate.   Eyes:      Extraocular Movements: Extraocular movements intact.      Conjunctiva/sclera: Conjunctivae normal.   Cardiovascular:      Rate and Rhythm: Normal rate and regular rhythm.      Heart sounds: Normal heart sounds.   Pulmonary:      Effort: Pulmonary effort is normal. No respiratory distress.      Breath sounds: Normal breath sounds.   Abdominal:      Palpations: Abdomen is soft.      Tenderness: There is no abdominal tenderness.   Musculoskeletal:      Cervical back: Normal range of motion.      Right lower leg: Edema present.      Left lower leg: Edema present.      Comments: BLE edema that is chronic per pt. No rash observed.   RLE edema > LLE edema chronically   Skin:     General: Skin is warm and dry.   Neurological:      General: No focal deficit present.      Mental Status: She is alert and oriented to person, place, and time.   Psychiatric:         Mood and Affect: Mood normal.         Behavior: Behavior normal.             Significant Labs: All pertinent labs within the past 24 hours have been reviewed.    Significant Imaging: I have reviewed all pertinent imaging results/findings within the past 24 hours.

## 2024-01-22 NOTE — CONSULTS
"[facility] - Observation 11H  Cardiology  Consult Note    Patient Name: Apoorva Fernandez  MRN: [id number]  Admission Date: 1/19/2024  Hospital Length of Stay: 0 days  Code Status: Full Code   Attending Provider: Roxi Joya MD   Consulting Provider: Michel Biggs MD  Primary Care Physician: Najma Rangel MD  Principal Problem:Chest pain    Patient information was obtained from patient, past medical records, and ER records.     Inpatient consult to Cardiology  Consult performed by: Michel Biggs MD  Consult ordered by: Tejal Prather PA-C        Subjective:     HPI:   Apoorva Fernandez is a 69 y.o. female with PMHx of HTN, HLD, obesity, who presented to the ED for 2 weeks of chest pain, following changes in blood pressure medication. The chest pain is described as central "pressure" with associated dyspnea on exertion. On 1/18 after taking first dose of Amlodipine, the pain radiated to her arms and the left jaw/neck. The pain was 8/10 on presentation and improved to 2/10 following 3 nitroglycerin and aspirin in ED. She was taking triamterene HCTZ but stopped recently 2/2 kidney function (Cr 1.2). Took losartan for 4 days but stopped due to side effects of chest pain, palpitations. Took amlodipine 5 mg once 1/18 then stopped due to chest pain. Initial /85, improved s/p nitroglycerin.Troponin x2 wnl. BNP 30. D-dimer slightly elevated to 0.75. CBC and CMP unremarkable. EKG NSR, Left axis deviation, RBBB, rate 65. CXR with coarse interstitial attenuation and basilar subsegmental atelectasis bilaterally.  CTA without definite evidence of PE but examination degraded by respiratory motion artifact. Stress echo showed EF 65%, normal diastolic function, normal systolic function, ecg portion negative, no evidence of stress induced ischemia. Test was stopped d/t VT/VF and AV block. Cardiology was consulted for further recommendations. Patient no longer complaining of chest pain and GORDILLO is improved at " time of initial cardiology evaluation.      Past Medical History:   Diagnosis Date    Allergic rhinitis     Allergy     Arthritis     Hyperlipidemia     Diet-controlled    Hypertension     Joint pain     Keloid cicatrix     Obesity     Osteoarthritis     Personal history of colonic polyps 2010    Adenomatous    Personal history of peptic ulcer disease     PONV (postoperative nausea and vomiting)     Sleep apnea     Umbilical hernia        Past Surgical History:   Procedure Laterality Date    Abdominal/umbilical hernia repair      removed small part of small bowel; mesh placed     SECTION, LOW TRANSVERSE      X2    CHOLECYSTECTOMY      COLONOSCOPY N/A 3/26/2019    Procedure: COLONOSCOPY;  Surgeon: Chandrika Ho MD;  Location: [facility] ENDO;  Service: Endoscopy;  Laterality: N/A;    Open laparoscopy for a ruptured ulcer      TOTAL KNEE ARTHROPLASTY Right 8/15/2022    Procedure: ARTHROPLASTY, KNEE, TOTAL- JOAN;  Surgeon: Jass Willis MD;  Location: [facility] OR;  Service: Orthopedics;  Laterality: Right;  NAEREGI SALGADO [phone] TEXTED HIM @ 7:06AM  ON 27:06AM ON 2022  1ST BALWINDER DVAIS [phone] TEXTED HER @ 7:16AM ON 2022 REPLIED @ 7:18AM ON 2022  NOON START  SUPINE  RN PREOP 2022    T/S ON 2022       HAS MEDICAL TESHA       Review of patient's allergies indicates:   Allergen Reactions    Amlodipine Other (See Comments)     Chest pressure    Antihistamines - alkylamine Shortness Of Breath    Losartan Shortness Of Breath    Atorvastatin      Other reaction(s): myalgia  Other reaction(s): myalgia    Ezetimibe      Other reaction(s): muscle cramps  Other reaction(s): muscle cramps    Lactose intolerance (lactase) [lactase] Nausea Only       No current facility-administered medications on file prior to encounter.     Current Outpatient Medications on File Prior to Encounter   Medication Sig    aspirin (ECOTRIN) 81 MG EC tablet Take 1 tablet (81 mg total) by  mouth 2 (two) times a day. (Patient taking differently: Take 81 mg by mouth once daily.)    fluticasone propionate (FLONASE) 50 mcg/actuation nasal spray SPRAY 2 SPRAYs INTO EACH NOSTRIL ONCE EVERY DAY     Family History       Problem Relation (Age of Onset)    Breast cancer Mother    Cancer Father    Glaucoma Father    Heart disease Father, Brother    Hypertension Father    Obesity Sister    Sleep apnea Brother          Tobacco Use    Smoking status: Never    Smokeless tobacco: Never   Substance and Sexual Activity    Alcohol use: Yes     Alcohol/week: 2.0 - 3.0 standard drinks of alcohol     Types: 2 - 3 Glasses of wine per week     Comment: Socially on the weekends    Drug use: No    Sexual activity: Yes     Partners: Male     Review of Systems   Cardiovascular:  Positive for leg swelling (chronic). Negative for chest pain, claudication, dyspnea on exertion, irregular heartbeat, near-syncope, orthopnea and palpitations.     Objective:     Vital Signs (Most Recent):  Temp: 98.6 °F (37 °C) (01/22/24 1227)  Pulse: (!) 58 (01/22/24 1227)  Resp: 20 (01/22/24 1227)  BP: (!) 158/68 (01/22/24 1227)  SpO2: 99 % (01/22/24 1227) Vital Signs (24h Range):  Temp:  [97.4 °F (36.3 °C)-98.6 °F (37 °C)] 98.6 °F (37 °C)  Pulse:  [58-77] 58  Resp:  [16-20] 20  SpO2:  [92 %-99 %] 99 %  BP: (129-158)/(59-94) 158/68     Weight: 103.4 kg (228 lb)  Body mass index is 44.53 kg/m².    SpO2: 99 %         Intake/Output Summary (Last 24 hours) at 1/22/2024 1254  Last data filed at 1/22/2024 0030  Gross per 24 hour   Intake 500 ml   Output --   Net 500 ml       Lines/Drains/Airways       Peripheral Intravenous Line  Duration                  Peripheral IV - Single Lumen 01/20/24 1715 20 G;1 3/4 in Anterior;Right Forearm 1 day                     Physical Exam  Constitutional:       General: She is not in acute distress.     Appearance: She is obese. She is not ill-appearing.   HENT:      Head: Normocephalic and atraumatic.   Eyes:       "Conjunctiva/sclera: Conjunctivae normal.   Cardiovascular:      Rate and Rhythm: Normal rate.      Pulses: Normal pulses.      Heart sounds: Normal heart sounds. No murmur heard.  Pulmonary:      Effort: Pulmonary effort is normal. No respiratory distress.      Breath sounds: Normal breath sounds. No wheezing.   Abdominal:      General: There is no distension.      Palpations: Abdomen is soft.      Tenderness: There is no abdominal tenderness.   Musculoskeletal:      Cervical back: Normal range of motion. No rigidity.      Right lower leg: Edema present.      Left lower leg: Edema present.   Neurological:      Mental Status: She is alert and oriented to person, place, and time.   Psychiatric:         Mood and Affect: Mood normal.         Behavior: Behavior normal.          Significant Labs: CMP   Recent Labs   Lab 01/21/24  1538 01/22/24  0748   * 139   K 4.1 3.7    109   CO2 23 21*   GLU 91 101   BUN 16 16   CREATININE 0.8 1.0   CALCIUM 8.8 8.6*   ANIONGAP 7* 9   , CBC No results for input(s): "WBC", "HGB", "HCT", "PLT" in the last 48 hours., and All pertinent lab results from the last 24 hours have been reviewed.    Significant Imaging:  Stress Echo Pharmacological  Result Date: 1/20/2024    Left Ventricle: The left ventricle is normal in size. Normal wall   thickness. Normal wall motion. There is normal systolic function with a   visually estimated ejection fraction of 60 - 65%. Ejection fraction by   visual approximation is 65%. There is normal diastolic function.    Right Ventricle: Normal right ventricular cavity size. Wall thickness   is normal. Right ventricle wall motion  is normal. Systolic function is   normal.    Left Atrium: Left atrium is mildly dilated.    Aortic Valve: There is mild aortic valve sclerosis. There is mild   stenosis. Aortic valve area by VTI is 2.20 cm². Aortic valve peak velocity   is 1.61 m/s. Mean gradient is 6 mmHg. The dimensionless index is 0.64.    Mitral Valve: " There is mild mitral annular calcification present. There   is mild regurgitation.    IVC/SVC: Normal venous pressure at 3 mmHg.    Stress Protocol: The test was stopped because the technologist noted   VT/VF, and an A-V block. 6 BEAT VT VS FLIPPED BUNDLE  FOLLOWED BY 2   COUPLETS. MED D/RICH .    Baseline ECG: The Baseline ECG reveals sinus bradycardia with 1st   degree A/V block and RBBB. The axis is normal. The ST segments are normal.    Stress ECG: There are no ST segment deviation identified during the   protocol. During stress, occasional PVCs are noted including 6 beat VT and   couplets. There is normal blood pressure response with stress.    ECG Conclusion: The ECG portion of the study is negative for ischemia.    Post-stress Echo: The left ventricle systolic function is hyperdynamic   with an EF of 73 %. Right ventricle systolic function is normal.    Post-stress Impression: The study is negative with no echocardiographic   evidence of stress induced ischemia.         Assessment and Plan:     * Chest pain  - Symptoms concerning for cardiac etiology given history, patient risk factors, and resolution with NG in ED  - stress Echo negative for ischemia, EKG NSR with RBBB seen on priors, Troponin x2 wnl, BNP 30    Recommendations  - Will follow up Nuclear Cardiac Stress Test  - Outpatient follow up for cardiac risk factor modification and optimization     Other hyperlipidemia  - Unable to tolerate statin/Zetia in the past, however needs cardiac risk factor modification    Recommendations  - CoEnzyme Q10 200mg Daily for 2 weeks  - Start Pravastatin 40mg QHS AFTER finished with 2 weeks of CoQ10 (least likely agent to cause muscle cramping)  - Check Vitamin D level as low Vitamin D can contribute to statin-associated myopathy        VTE Risk Mitigation (From admission, onward)           Ordered     heparin (porcine) injection 7,500 Units  Every 8 hours         01/19/24 2325     IP VTE HIGH RISK PATIENT  Once          01/19/24 2325     Place sequential compression device  Until discontinued         01/19/24 2325                    Please see staff attestation for final recommendations  Thank you for your consult. I will follow-up with patient. Please contact us if you have any additional questions.    Michel Biggs MD  Cardiology   [address] - Observation 11H  I have personally taken the history and examined the patient and agree with the resident's note as stated above. DSE not very worrisome but [facility] should be better . Need to treat lipids and I told her we could assess Vit D as if low replacing makes statin therapy easier.Also Co Q 10 200 mg/d and then start Prava 40 mg at nite 2 weeks after starting.

## 2024-01-22 NOTE — NURSING
NP MIL bernardo informed that patient states Amlodipine causes chest pressure and she is having constant pressure that is not painful but noticeable. Pt is not having any distress. Amlodipine is listed as an allergy. Pts results reviewed. Everything it negative. Amlodipine discontinued. Reassurance given to pt

## 2024-01-22 NOTE — ASSESSMENT & PLAN NOTE
69F with history of HTN, HLD, obesity presenting with new onset of substernal chest pain and GORDILLO x2 two weeks. Radiates to neck /jaw, arms. Chest pain improved with nitroglycerin. HEART score 6.  - Initially hypertensive but improved and vitals otherwise stable  - Troponin x2 normal. BNP wnl   - EKG without acute ischemic change  - D dimer slightly elevated. CTA negative for PE but limited study.   - S/p aspirin 325 and nitro x3 in ED  - Monitor telemetry   - Stress echo reviewed  - NM stress test positive  - DVT US negative  - Aspirin 81 mg daily  - PRN nitroglycerin

## 2024-01-22 NOTE — PLAN OF CARE
[facility]  Discharge Reassessment    Per MD team, pt is not medically ready for d/c at this time. Plan is to d/c to home w/family when ready. Will continue to follow for needs.    Primary Care Provider: Najma Rangel MD    Expected Discharge Date: 1/23/2024    Reassessment (most recent)       Discharge Reassessment - 01/22/24 1644          Discharge Reassessment    Assessment Type Discharge Planning Reassessment     Did the patient's condition or plan change since previous assessment? No     Discharge Plan discussed with: Patient (P)      Communicated JULIO with patient/caregiver Date not available/Unable to determine (P)      Discharge Plan A Home with family (P)      Discharge Plan B Home with family;Home Health (P)      DME Needed Upon Discharge  other (see comments) (P)    TBD    Why the patient remains in the hospital Requires continued medical care (P)         Post-Acute Status    Post-Acute Authorization Other (P)      Other Status No Post-Acute Service Needs (P)      Discharge Delays None known at this time (P)                    Discharge Plan A and Plan B have been determined by review of patient's clinical status, future medical and therapeutic needs, and coverage/benefits for post-acute care in coordination with multidisciplinary team members.    JENNIFER Adrian, LMSW    Case Management Department  [facility]

## 2024-01-22 NOTE — ASSESSMENT & PLAN NOTE
- Symptoms concerning for cardiac etiology given history, patient risk factors, and resolution with NG in ED  - stress Echo negative for ischemia, EKG NSR with RBBB seen on priors, Troponin x2 wnl, BNP 30    Recommendations  - Will follow up Nuclear Cardiac Stress Test  - Outpatient follow up for cardiac risk factor modification and optimization

## 2024-01-22 NOTE — PROGRESS NOTES
[facility] [address] [place] [address] Medicine  Progress Note    Patient Name: Apoorva Fernandez  MRN: [id number]  Patient Class: OP- Observation   Admission Date: 1/19/2024  Length of Stay: 0 days  Attending Physician: Roxi Joya MD  Primary Care Provider: Najma Rangel MD        Subjective:     Principal Problem:Chest pain        HPI:  Apoorva Fernandez is a 69 y.o. female with PMHx of HTN, HLD, obesity, She presents to the ED with chest pain. For about two weeks now she has had substernal chest pressure with associated dyspnea on exertion. She has had to stop when walking very short distances to catch her breath. The chest pain has radiated to her arms and the left jaw/neck. Chest pain/GORDILLO today not relieved with rest and severe. Felt like there was an elephant siting on her chest. After getting 3 nitroglycerin and aspirin in ED the pain improved and felt more like a baby sitting on her chest. Some associated lightheadedness. Has leg swelling but this is more chronic, especially after stopped her fluid pills.  Denies fever, chills, cough, nausea, vomiting. Her BP has not been very well controlled lately and she has been changing medications with her PCP. States she was taking triamterene HCTZ but stopped recently due to her kidney function. Started losartan but then stopped around 1/10 because of her chest pain that she though may be associated with losartan. Started amlodipine 5 mg 1/18 then stopped due to chest pain. SBP 1/18 recorded 179/67 and 165/72.     In the ED, Initial /85, improved s/p nitroglycerin. Otherwise afebrile and vitals stable. Troponin x2 wnl. BNP 30. D-dimer slightly elevated to 0.75. CBC and CMP unremarkable. EKG NSR, Left axis deviation, RBBB, rate 65. CXR with coarse interstitial attenuation and basilar subsegmental atelectasis bilaterally.  CTA without definite evidence of PE but examination degraded by respiratory motion artifact. She was given aspirin 325 mg,  nitroglycerin x3. Admitted to  for chest pain.    Overview/Hospital Course:  Pt placed in obserrvation for substernal chest pressure and GORDILLO concerning for ACS. Pt HDS on RA. Troponin negative x2, BNP wnl. D dimer elevated. ECG w/ NSR, RBBB, no gross acute ischemia. CXR and CTA chest unremarkable. DVT US negative. Pt's pain greatly reduced w/ sublingual nitro and asa. Stress echo showed EF 65%, normal diastolic function, normal systolic function, ecg portion negative, no evidence of stress induced ischemia. Test was stopped d/t VT/VF and AV block. Cardiology consulted and recommending NM stress test. Results showed  a mild intensity, small sized, reversible perfusion abnormality that is consistent with ischemia in the mid inferolateral wall(s). abnormal myocardial perfusion scan.     Interval History: NAEON. Pt seen and evaluated at bedside this am. Pt is doing well and had been NPO since midnight for NM stress test. Stress test today was positive, Pt currently NPO for potential intervention by cardiology.    Review of Systems   Constitutional:  Negative for activity change, chills, diaphoresis, fatigue and fever.   HENT:  Negative for congestion, rhinorrhea and sore throat.    Respiratory:  Positive for shortness of breath. Negative for cough, chest tightness and wheezing.    Cardiovascular:  Negative for chest pain, palpitations and leg swelling.   Gastrointestinal:  Negative for abdominal distention, abdominal pain, blood in stool, constipation, diarrhea, nausea and vomiting.   Genitourinary:  Negative for difficulty urinating, dysuria, frequency, hematuria and urgency.   Musculoskeletal:  Negative for arthralgias, back pain and neck stiffness.   Neurological:  Negative for dizziness, tremors, seizures, syncope, weakness, light-headedness, numbness and headaches.   Psychiatric/Behavioral:  Negative for agitation, confusion and hallucinations.      Objective:     Vital Signs (Most Recent):  Temp: 98.6 °F (37 °C)  (01/22/24 1227)  Pulse: 63 (01/22/24 1455)  Resp: 20 (01/22/24 1227)  BP: (!) 158/68 (01/22/24 1227)  SpO2: 98 % (01/22/24 1455) Vital Signs (24h Range):  Temp:  [97.4 °F (36.3 °C)-98.6 °F (37 °C)] 98.6 °F (37 °C)  Pulse:  [58-70] 63  Resp:  [16-20] 20  SpO2:  [92 %-99 %] 98 %  BP: (129-158)/(59-94) 158/68     Weight: 103.4 kg (228 lb)  Body mass index is 44.53 kg/m².    Intake/Output Summary (Last 24 hours) at 1/22/2024 1531  Last data filed at 1/22/2024 0030  Gross per 24 hour   Intake 500 ml   Output --   Net 500 ml         Physical Exam  Vitals and nursing note reviewed.   Constitutional:       General: She is not in acute distress.     Appearance: She is obese.   HENT:      Head: Normocephalic and atraumatic.      Nose: Nose normal.      Mouth/Throat:      Pharynx: No oropharyngeal exudate.   Eyes:      Extraocular Movements: Extraocular movements intact.      Conjunctiva/sclera: Conjunctivae normal.   Cardiovascular:      Rate and Rhythm: Normal rate and regular rhythm.      Heart sounds: Normal heart sounds.   Pulmonary:      Effort: Pulmonary effort is normal. No respiratory distress.      Breath sounds: Normal breath sounds.   Abdominal:      Palpations: Abdomen is soft.      Tenderness: There is no abdominal tenderness.   Musculoskeletal:      Cervical back: Normal range of motion.      Right lower leg: Edema present.      Left lower leg: Edema present.      Comments: BLE edema that is chronic per pt. No rash observed.   RLE edema > LLE edema chronically   Skin:     General: Skin is warm and dry.   Neurological:      General: No focal deficit present.      Mental Status: She is alert and oriented to person, place, and time.   Psychiatric:         Mood and Affect: Mood normal.         Behavior: Behavior normal.             Significant Labs: All pertinent labs within the past 24 hours have been reviewed.    Significant Imaging: I have reviewed all pertinent imaging results/findings within the past 24  hours.    Assessment/Plan:      * Chest pain  69F with history of HTN, HLD, obesity presenting with new onset of substernal chest pain and GORDILLO x2 two weeks. Radiates to neck /jaw, arms. Chest pain improved with nitroglycerin. HEART score 6.  - Initially hypertensive but improved and vitals otherwise stable  - Troponin x2 normal. BNP wnl   - EKG without acute ischemic change  - D dimer slightly elevated. CTA negative for PE but limited study.   - S/p aspirin 325 and nitro x3 in ED  - Monitor telemetry   - Stress echo reviewed  - NM stress test positive  - DVT US negative  - Aspirin 81 mg daily  - PRN nitroglycerin     BELLA (obstructive sleep apnea)  - CPAP qhs    Essential hypertension  Chronic, uncontrolled initially, better s/p nitroglycerin. Latest blood pressure and vitals reviewed-     Temp:  [97.4 °F (36.3 °C)-98.6 °F (37 °C)]   Pulse:  [58-70]   Resp:  [16-20]   BP: (129-158)/(59-94)   SpO2:  [92 %-99 %] .   Home meds for hypertension were reviewed and noted below.   Hypertension Medications               amLODIPine (NORVASC) 5 MG tablet Take 1 tablet (5 mg total) by mouth once daily.            While in the hospital, will manage blood pressure as follows; Adjust home antihypertensive regimen as follows-    Patient reports side effect of amlodipine as chest pain and visual disturbance of flashing colors in her vision transiently. Also reports losartan side effect of chest pain. She reports she was taken off triamterene HCTZ due to her kidney function. Currently not taking any antihypertensives. Will monitor her BP overnight as is is now controlled. Her Cr is 0.8 which appears to be her baseline. It had gone up to 1.2 prior to her medication changes. Consider starting low dose BB in AM after stress test or nifedipine 30 mg daily. Could also consider resuming amlodipine 5 mg daily and monitor for side effects/check orthostatic vitals after administration. Visual changes possibly related to elevated BP (she  recorded higher BP on days she was taking amlodipine) or possible orthostatic hypotension? Needs PCP follow up.    Will utilize p.r.n. blood pressure medication only if patient's blood pressure greater than 180/110 and she develops symptoms such as worsening chest pain or shortness of breath.    Morbid obesity with BMI of 45.0-49.9, adult  Body mass index is 44.53 kg/m². Morbid obesity complicates all aspects of disease management from diagnostic modalities to treatment. Weight loss encouraged and health benefits explained to patient.    Other hyperlipidemia  - Currently diet controlled  - Did not tolerate Lipitor or Zetia  - Last lipid panel 1/3 reviewed      VTE Risk Mitigation (From admission, onward)           Ordered     heparin (porcine) injection 7,500 Units  Every 8 hours         01/19/24 2325     IP VTE HIGH RISK PATIENT  Once         01/19/24 2325     Place sequential compression device  Until discontinued         01/19/24 2325                    Discharge Planning   JULIO: 1/22/2024     Code Status: Full Code   Is the patient medically ready for discharge?: No    Reason for patient still in hospital (select all that apply): Patient trending condition, Laboratory test, Treatment, and Consult recommendations  Discharge Plan A: Home with family                  Tejal Prather PA-C  Department of Hospital Medicine   [facility]

## 2024-01-22 NOTE — PLAN OF CARE
Cardiology Final Recommendations:  Chest pain  - Symptoms concerning for cardiac etiology given history, patient risk factors, and resolution with NG in ED  - stress Echo negative for ischemia, EKG NSR with RBBB seen on priors, Troponin x2 wnl, BNP 30  - SPECT with mild intensity, small sized, reversible perfusion abnormality that is consistent with ischemia in the mid inferolateral wall(s)      Recommendations  - Medically manage for now through risk factor modification and optimization  - Discharge with PRN Nitro   - Outpatient follow up with cardiology in 1 week placed    Hypertension  - START Triamterene/HCTZ 25/37.5, 1/2 tablet daily  - consider addition of spironolactone if additional BP control needed  - HR high 50s-low 60s, cannot start beta-blocker     Other hyperlipidemia  - Unable to tolerate statin/Zetia in the past, however needs cardiac risk factor modification     Recommendations  - CoEnzyme Q10 200mg Daily for 2 weeks  - Start Pravastatin 40mg QHS AFTER finished with 2 weeks of CoQ10 (least likely agent to cause muscle cramping)  - Check Vitamin D level as low Vitamin D can contribute to statin-associated myopathy    Thank you for your consult. We will sign off. Please call for any questions.

## 2024-01-22 NOTE — HPI
"Apoorva Fernandez is a 69 y.o. female with PMHx of HTN, HLD, obesity, who presented to the ED for 2 weeks of chest pain, following changes in blood pressure medication. The chest pain is described as central "pressure" with associated dyspnea on exertion. On 1/18 after taking first dose of Amlodipine, the pain radiated to her arms and the left jaw/neck. The pain was 8/10 on presentation and improved to 2/10 following 3 nitroglycerin and aspirin in ED. She was taking triamterene HCTZ but stopped recently 2/2 kidney function (Cr 1.2). Took losartan for 4 days but stopped due to side effects of chest pain, palpitations. Took amlodipine 5 mg once 1/18 then stopped due to chest pain. Initial /85, improved s/p nitroglycerin.Troponin x2 wnl. BNP 30. D-dimer slightly elevated to 0.75. CBC and CMP unremarkable. EKG NSR, Left axis deviation, RBBB, rate 65. CXR with coarse interstitial attenuation and basilar subsegmental atelectasis bilaterally.  CTA without definite evidence of PE but examination degraded by respiratory motion artifact. Stress echo showed EF 65%, normal diastolic function, normal systolic function, ecg portion negative, no evidence of stress induced ischemia. Test was stopped d/t VT/VF and AV block. Cardiology was consulted for further recommendations. Patient no longer complaining of chest pain and GORDILLO is improved at time of initial cardiology evaluation.    "

## 2024-01-22 NOTE — ASSESSMENT & PLAN NOTE
- Symptoms concerning for cardiac etiology given history, patient risk factors, and resolution with NG in ED  - stress Echo negative for ischemia, EKG NSR with RBBB seen on priors, Troponin x2 wnl, BNP 30    Recommendations  - Will follow up Cardiac Stress PET  - Outpatient follow up for cardiac risk factor modification and optimization

## 2024-01-22 NOTE — SUBJECTIVE & OBJECTIVE
Past Medical History:   Diagnosis Date    Allergic rhinitis     Allergy     Arthritis     Hyperlipidemia     Diet-controlled    Hypertension     Joint pain     Keloid cicatrix     Obesity     Osteoarthritis     Personal history of colonic polyps 2010    Adenomatous    Personal history of peptic ulcer disease     PONV (postoperative nausea and vomiting)     Sleep apnea     Umbilical hernia        Past Surgical History:   Procedure Laterality Date    Abdominal/umbilical hernia repair      removed small part of small bowel; mesh placed     SECTION, LOW TRANSVERSE      X2    CHOLECYSTECTOMY      COLONOSCOPY N/A 3/26/2019    Procedure: COLONOSCOPY;  Surgeon: Chandrika Ho MD;  Location: [facility] END[facility];  Service: Endoscopy;  Laterality: N/A;    Open laparoscopy for a ruptured ulcer      TOTAL KNEE ARTHROPLASTY Right 8/15/2022    Procedure: ARTHROPLASTY, KNEE, TOTAL- JOAN;  Surgeon: Jass Willis MD;  Location: [facility] OR;  Service: Orthopedics;  Laterality: Right;  NAE SALGADO [phone] TEXTED HIM @ 7:06AM  ON 27:06AM ON 2022  1ST BALWINDER DAVIS [phone] TEXTED HER @ 7:16AM ON 2022 REPLIED @ 7:18AM ON 2022  NOON START  SUPINE  RN PREOP 2022    T/S ON 2022       HAS MEDICAL TESHA       Review of patient's allergies indicates:   Allergen Reactions    Amlodipine Other (See Comments)     Chest pressure    Antihistamines - alkylamine Shortness Of Breath    Losartan Shortness Of Breath    Atorvastatin      Other reaction(s): myalgia  Other reaction(s): myalgia    Ezetimibe      Other reaction(s): muscle cramps  Other reaction(s): muscle cramps    Lactose intolerance (lactase) [lactase] Nausea Only       No current facility-administered medications on file prior to encounter.     Current Outpatient Medications on File Prior to Encounter   Medication Sig    aspirin (ECOTRIN) 81 MG EC tablet Take 1 tablet (81 mg total) by mouth 2 (two) times a day. (Patient taking  differently: Take 81 mg by mouth once daily.)    fluticasone propionate (FLONASE) 50 mcg/actuation nasal spray SPRAY 2 SPRAYs INTO EACH NOSTRIL ONCE EVERY DAY     Family History       Problem Relation (Age of Onset)    Breast cancer Mother    Cancer Father    Glaucoma Father    Heart disease Father, Brother    Hypertension Father    Obesity Sister    Sleep apnea Brother          Tobacco Use    Smoking status: Never    Smokeless tobacco: Never   Substance and Sexual Activity    Alcohol use: Yes     Alcohol/week: 2.0 - 3.0 standard drinks of alcohol     Types: 2 - 3 Glasses of wine per week     Comment: Socially on the weekends    Drug use: No    Sexual activity: Yes     Partners: Male     Review of Systems   Cardiovascular:  Positive for leg swelling (chronic). Negative for chest pain, claudication, dyspnea on exertion, irregular heartbeat, near-syncope, orthopnea and palpitations.     Objective:     Vital Signs (Most Recent):  Temp: 98.6 °F (37 °C) (01/22/24 1227)  Pulse: (!) 58 (01/22/24 1227)  Resp: 20 (01/22/24 1227)  BP: (!) 158/68 (01/22/24 1227)  SpO2: 99 % (01/22/24 1227) Vital Signs (24h Range):  Temp:  [97.4 °F (36.3 °C)-98.6 °F (37 °C)] 98.6 °F (37 °C)  Pulse:  [58-77] 58  Resp:  [16-20] 20  SpO2:  [92 %-99 %] 99 %  BP: (129-158)/(59-94) 158/68     Weight: 103.4 kg (228 lb)  Body mass index is 44.53 kg/m².    SpO2: 99 %         Intake/Output Summary (Last 24 hours) at 1/22/2024 1254  Last data filed at 1/22/2024 0030  Gross per 24 hour   Intake 500 ml   Output --   Net 500 ml       Lines/Drains/Airways       Peripheral Intravenous Line  Duration                  Peripheral IV - Single Lumen 01/20/24 1715 20 G;1 3/4 in Anterior;Right Forearm 1 day                     Physical Exam  Constitutional:       General: She is not in acute distress.     Appearance: She is obese. She is not ill-appearing.   HENT:      Head: Normocephalic and atraumatic.   Eyes:      Conjunctiva/sclera: Conjunctivae normal.  "  Cardiovascular:      Rate and Rhythm: Normal rate.      Pulses: Normal pulses.      Heart sounds: Normal heart sounds. No murmur heard.  Pulmonary:      Effort: Pulmonary effort is normal. No respiratory distress.      Breath sounds: Normal breath sounds. No wheezing.   Abdominal:      General: There is no distension.      Palpations: Abdomen is soft.      Tenderness: There is no abdominal tenderness.   Musculoskeletal:      Cervical back: Normal range of motion. No rigidity.      Right lower leg: Edema present.      Left lower leg: Edema present.   Neurological:      Mental Status: She is alert and oriented to person, place, and time.   Psychiatric:         Mood and Affect: Mood normal.         Behavior: Behavior normal.          Significant Labs: CMP   Recent Labs   Lab 01/21/24  1538 01/22/24  0748   * 139   K 4.1 3.7    109   CO2 23 21*   GLU 91 101   BUN 16 16   CREATININE 0.8 1.0   CALCIUM 8.8 8.6*   ANIONGAP 7* 9   , CBC No results for input(s): "WBC", "HGB", "HCT", "PLT" in the last 48 hours., and All pertinent lab results from the last 24 hours have been reviewed.    Significant Imaging:  Stress Echo Pharmacological  Result Date: 1/20/2024    Left Ventricle: The left ventricle is normal in size. Normal wall   thickness. Normal wall motion. There is normal systolic function with a   visually estimated ejection fraction of 60 - 65%. Ejection fraction by   visual approximation is 65%. There is normal diastolic function.    Right Ventricle: Normal right ventricular cavity size. Wall thickness   is normal. Right ventricle wall motion  is normal. Systolic function is   normal.    Left Atrium: Left atrium is mildly dilated.    Aortic Valve: There is mild aortic valve sclerosis. There is mild   stenosis. Aortic valve area by VTI is 2.20 cm². Aortic valve peak velocity   is 1.61 m/s. Mean gradient is 6 mmHg. The dimensionless index is 0.64.    Mitral Valve: There is mild mitral annular calcification " present. There   is mild regurgitation.    IVC/SVC: Normal venous pressure at 3 mmHg.    Stress Protocol: The test was stopped because the technologist noted   VT/VF, and an A-V block. 6 BEAT VT VS FLIPPED BUNDLE  FOLLOWED BY 2   COUPLETS. MED D/RICH .    Baseline ECG: The Baseline ECG reveals sinus bradycardia with 1st   degree A/V block and RBBB. The axis is normal. The ST segments are normal.    Stress ECG: There are no ST segment deviation identified during the   protocol. During stress, occasional PVCs are noted including 6 beat VT and   couplets. There is normal blood pressure response with stress.    ECG Conclusion: The ECG portion of the study is negative for ischemia.    Post-stress Echo: The left ventricle systolic function is hyperdynamic   with an EF of 73 %. Right ventricle systolic function is normal.    Post-stress Impression: The study is negative with no echocardiographic   evidence of stress induced ischemia.

## 2024-01-23 VITALS
DIASTOLIC BLOOD PRESSURE: 63 MMHG | OXYGEN SATURATION: 96 % | WEIGHT: 228 LBS | RESPIRATION RATE: 18 BRPM | HEART RATE: 65 BPM | TEMPERATURE: 98 F | HEIGHT: 60 IN | SYSTOLIC BLOOD PRESSURE: 141 MMHG | BODY MASS INDEX: 44.76 KG/M2

## 2024-01-23 PROCEDURE — 25000003 PHARM REV CODE 250

## 2024-01-23 PROCEDURE — 96372 THER/PROPH/DIAG INJ SC/IM: CPT

## 2024-01-23 PROCEDURE — 63600175 PHARM REV CODE 636 W HCPCS

## 2024-01-23 PROCEDURE — G0378 HOSPITAL OBSERVATION PER HR: HCPCS

## 2024-01-23 RX ORDER — PRAVASTATIN SODIUM 40 MG/1
40 TABLET ORAL DAILY
Qty: 90 TABLET | Refills: 3 | Status: SHIPPED | OUTPATIENT
Start: 2024-02-07 | End: 2024-03-08

## 2024-01-23 RX ORDER — NITROGLYCERIN 0.4 MG/1
0.4 TABLET SUBLINGUAL EVERY 5 MIN PRN
Qty: 25 TABLET | Refills: 0 | Status: SHIPPED | OUTPATIENT
Start: 2024-01-23

## 2024-01-23 RX ORDER — TRIAMTERENE/HYDROCHLOROTHIAZID 37.5-25 MG
0.5 TABLET ORAL DAILY
Qty: 15 TABLET | Refills: 11 | Status: SHIPPED | OUTPATIENT
Start: 2024-01-23 | End: 2025-01-22

## 2024-01-23 RX ORDER — ACETAMINOPHEN 160 MG/5ML
200 SUSPENSION, ORAL (FINAL DOSE FORM) ORAL DAILY
Qty: 14 CAPSULE | Refills: 0 | Status: SHIPPED | OUTPATIENT
Start: 2024-01-23 | End: 2024-06-11

## 2024-01-23 RX ORDER — ASPIRIN 81 MG/1
81 TABLET ORAL DAILY
Start: 2024-01-23

## 2024-01-23 RX ADMIN — HEPARIN SODIUM 7500 UNITS: 5000 INJECTION INTRAVENOUS; SUBCUTANEOUS at 06:01

## 2024-01-23 RX ADMIN — ASPIRIN 81 MG CHEWABLE TABLET 81 MG: 81 TABLET CHEWABLE at 09:01

## 2024-01-23 NOTE — PLAN OF CARE
Problem: Adult Inpatient Plan of Care  Goal: Plan of Care Review  Outcome: Met  Goal: Patient-Specific Goal (Individualized)  Outcome: Met  Goal: Absence of Hospital-Acquired Illness or Injury  Outcome: Met  Goal: Optimal Comfort and Wellbeing  Outcome: Met  Goal: Readiness for Transition of Care  Outcome: Met     Problem: Bariatric Environmental Safety  Goal: Safety Maintained with Care  Outcome: Met     Problem: Infection  Goal: Absence of Infection Signs and Symptoms  Outcome: Met     Problem: Chest Pain  Goal: Resolution of Chest Pain Symptoms  Outcome: Met     Problem: Hypertension Comorbidity  Goal: Blood Pressure in Desired Range  Outcome: Met     Problem: Obstructive Sleep Apnea Risk or Actual Comorbidity Management  Goal: Unobstructed Breathing During Sleep  Outcome: Met

## 2024-01-23 NOTE — PLAN OF CARE
[facility]  Discharge Final Note    Primary Care Provider: Najma Rangel MD    Expected Discharge Date: 1/23/2024      Patient discharged to home with no needs. Follow up appointment scheduled and placed in AVS. Discharge Plan A and Plan B have been determined by review of patient's clinical status, future medical and therapeutic needs, and coverage/benefits for post-acute care in coordination with multidisciplinary team members.  Final Discharge Note (most recent)       Final Note - 01/23/24 1455          Final Note    Assessment Type Final Discharge Note (P)      Anticipated Discharge Disposition Home or Self Care (P)      Hospital Resources/Appts/Education Provided Appointments scheduled and added to AVS (P)         Post-Acute Status    Other Status No Post-Acute Service Needs (P)      Discharge Delays None known at this time (P)                      Important Message from Medicare             Contact Info       [facility] - 3rd Fl   Specialty: Cardiology    [address]  [place] [address]   Phone: [phone]       Next Steps: Follow up in 1 week(s)    Instructions: Hospital DC follow-up for chest pain; mildly positive SPECT and medically managed            Angle Mulligan RNCM  Case Management  [phone]

## 2024-01-23 NOTE — NURSING
Discharge instructions and education given to pt and family. Verbalized understanding. Removal of IV. No redness, swelling or drainage noted. Telemetry removed.  Pt escort requested pending.

## 2024-01-23 NOTE — NURSING
Pt AAOX4. No distress noted. Bed in lowest position. Side rails up x2. Call bell and personal belongs within reach. Telemetry maintained. Safety precautions maintained. Pt free of falls or injuries. No further issues or concerns at this time. Family at the bedside.   Plan of care continues.

## 2024-01-23 NOTE — DISCHARGE SUMMARY
[facility] [address] [place] [address] Medicine  Discharge Summary      Patient Name: Apoorva Fernandez  MRN: [id number]  MAYO: [id number]  Patient Class: OP- Observation  Admission Date: 1/19/2024  Hospital Length of Stay: 0 days  Discharge Date and Time:  01/23/2024 11:39 AM  Attending Physician: Roxi Joya MD   Discharging Provider: Tejal Prather PA-C  Primary Care Provider: Najma Rangel MD  Hospital Medicine Team: [facility] Tejal Prather PA-C  Primary Care Team: [facility]    HPI:   Apoorva Fernandez is a 69 y.o. female with PMHx of HTN, HLD, obesity, She presents to the ED with chest pain. For about two weeks now she has had substernal chest pressure with associated dyspnea on exertion. She has had to stop when walking very short distances to catch her breath. The chest pain has radiated to her arms and the left jaw/neck. Chest pain/GORDILLO today not relieved with rest and severe. Felt like there was an elephant siting on her chest. After getting 3 nitroglycerin and aspirin in ED the pain improved and felt more like a baby sitting on her chest. Some associated lightheadedness. Has leg swelling but this is more chronic, especially after stopped her fluid pills.  Denies fever, chills, cough, nausea, vomiting. Her BP has not been very well controlled lately and she has been changing medications with her PCP. States she was taking triamterene HCTZ but stopped recently due to her kidney function. Started losartan but then stopped around 1/10 because of her chest pain that she though may be associated with losartan. Started amlodipine 5 mg 1/18 then stopped due to chest pain. SBP 1/18 recorded 179/67 and 165/72.     In the ED, Initial /85, improved s/p nitroglycerin. Otherwise afebrile and vitals stable. Troponin x2 wnl. BNP 30. D-dimer slightly elevated to 0.75. CBC and CMP unremarkable. EKG NSR, Left axis deviation, RBBB, rate 65. CXR with coarse interstitial attenuation and basilar  subsegmental atelectasis bilaterally.  CTA without definite evidence of PE but examination degraded by respiratory motion artifact. She was given aspirin 325 mg, nitroglycerin x3. Admitted to  for chest pain.    * No surgery found *      Hospital Course:   Pt placed in obserrvation for substernal chest pressure and GORDILLO concerning for ACS. Pt HDS on RA. Troponin negative x2, BNP wnl. D dimer elevated. ECG w/ NSR, RBBB, no gross acute ischemia. CXR and CTA chest unremarkable. DVT US negative. Pt's pain greatly reduced w/ sublingual nitro and asa. Stress echo showed EF 65%, normal diastolic function, normal systolic function, ecg portion negative, no evidence of stress induced ischemia. Test was stopped d/t VT/VF and AV block. Cardiology consulted and recommending NM stress test. Results showed  a mild intensity, small sized, reversible perfusion abnormality that is consistent with ischemia in the mid inferolateral wall(s). abnormal myocardial perfusion scan. Cardiology recommended no LHC at this time, medical management w/ sl nitro, statin, BP control. Cardiology f/u in 1 week. Pt med rec'd and medications were delivered to bedside prior to discharge. Pt medically ready for discharge home. Pt educated on hospital course and plan, verbally agrees and understands. All questions answered.       Goals of Care Treatment Preferences:  Code Status: Full Code      Consults:   Consults (From admission, onward)          Status Ordering Provider     Inpatient consult to Cardiology  Once        Provider:  (Not yet assigned)    Completed DARREN REYNAGA     Inpatient consult to PICC team ([facility])  Once        Provider:  (Not yet assigned)    Completed FRANKI TAPIA            Cardiac/Vascular  * Chest pain  69F with history of HTN, HLD, obesity presenting with new onset of substernal chest pain and GORDILLO x2 two weeks. Radiates to neck /jaw, arms. Chest pain improved with nitroglycerin. HEART score 6.  - Initially hypertensive  but improved and vitals otherwise stable  - Troponin x2 normal. BNP wnl   - EKG without acute ischemic change  - D dimer slightly elevated. CTA negative for PE but limited study.   - S/p aspirin 325 and nitro x3 in ED  - Monitor telemetry   - Stress echo reviewed  - NM stress test positive  - DVT US negative  - Aspirin 81 mg daily  - PRN nitroglycerin       Final Active Diagnoses:    Diagnosis Date Noted POA    PRINCIPAL PROBLEM:  Chest pain [R07.9] 01/20/2024 Yes    BELLA (obstructive sleep apnea) [G47.33]  Yes    Essential hypertension [I10] 04/21/2015 Yes    Morbid obesity with BMI of 45.0-49.9, adult [E66.01, Z68.42]  Not Applicable    Other hyperlipidemia [E78.49]  Yes      Problems Resolved During this Admission:       Discharged Condition: good    Disposition: Home or Self Care    Follow Up:   Follow-up Information       [facility] - [facility] Follow up in 1 week(s).    Specialty: Cardiology  Why: Hospital DC follow-up for chest pain; mildly positive SPECT and medically managed  Contact information:  [address]  [facility] [address] [phone]  Additional information:  Cardiology Services Clinics - 3rd floor                         Patient Instructions:      Ambulatory referral/consult to Cardiology   Standing Status: Future   Referral Priority: Routine Referral Type: Consultation   Referral Reason: Specialty Services Required   Requested Specialty: Cardiology   Number of Visits Requested: 1     Diet Cardiac     Notify your health care provider if you experience any of the following:  temperature >100.4     Notify your health care provider if you experience any of the following:  severe uncontrolled pain     Notify your health care provider if you experience any of the following:  difficulty breathing or increased cough     Notify your health care provider if you experience any of the following:  persistent dizziness, light-headedness, or visual disturbances     Activity as  tolerated       Significant Diagnostic Studies: Labs: All labs within the past 24 hours have been reviewed    Pending Diagnostic Studies:       None           Medications:  Reconciled Home Medications:      Medication List        START taking these medications      coenzyme Q10 200 mg capsule  Take 200 mg by mouth once daily. for 14 days     nitroGLYCERIN 0.4 MG SL tablet  Commonly known as: NITROSTAT  Place 1 tablet (0.4 mg total) under the tongue every 5 (five) minutes as needed for Chest pain.     pravastatin 40 MG tablet  Commonly known as: PRAVACHOL  Take 1 tablet (40 mg total) by mouth once daily.  Start taking on: February 7, 2024     triamterene-hydrochlorothiazide 37.5-25 mg 37.5-25 mg per tablet  Commonly known as: MAXZIDE-25  Take 1 tablet by mouth once daily. Take 1/2 tablet daily            CONTINUE taking these medications      aspirin 81 MG EC tablet  Commonly known as: ECOTRIN  Take 1 tablet (81 mg total) by mouth once daily.     fluticasone propionate 50 mcg/actuation nasal spray  Commonly known as: FLONASE  SPRAY 2 SPRAYs INTO EACH NOSTRIL ONCE EVERY DAY              Indwelling Lines/Drains at time of discharge:   Lines/Drains/Airways       None                   Time spent on the discharge of patient: 36 minutes         Tejal Prather PA-C  Department of Hospital Medicine  [facility]

## 2024-01-26 ENCOUNTER — OFFICE VISIT (OUTPATIENT)
Dept: CARDIOLOGY | Facility: CLINIC | Age: 69
End: 2024-01-26
Payer: COMMERCIAL

## 2024-01-26 VITALS
BODY MASS INDEX: 44.22 KG/M2 | HEART RATE: 67 BPM | SYSTOLIC BLOOD PRESSURE: 124 MMHG | DIASTOLIC BLOOD PRESSURE: 75 MMHG | WEIGHT: 226.44 LBS

## 2024-01-26 DIAGNOSIS — E78.2 MIXED HYPERLIPIDEMIA: ICD-10-CM

## 2024-01-26 DIAGNOSIS — E66.01 MORBID OBESITY WITH BMI OF 45.0-49.9, ADULT: ICD-10-CM

## 2024-01-26 DIAGNOSIS — G72.0 STATIN MYOPATHY: ICD-10-CM

## 2024-01-26 DIAGNOSIS — R07.2 PRECORDIAL PAIN: Primary | ICD-10-CM

## 2024-01-26 DIAGNOSIS — T46.6X5A STATIN MYOPATHY: ICD-10-CM

## 2024-01-26 DIAGNOSIS — I10 ESSENTIAL HYPERTENSION: ICD-10-CM

## 2024-01-26 PROCEDURE — 99999 PR PBB SHADOW E&M-EST. PATIENT-LVL III: CPT | Mod: PBBFAC,,, | Performed by: INTERNAL MEDICINE

## 2024-01-26 PROCEDURE — 3288F FALL RISK ASSESSMENT DOCD: CPT | Mod: CPTII,S$GLB,, | Performed by: INTERNAL MEDICINE

## 2024-01-26 PROCEDURE — 3008F BODY MASS INDEX DOCD: CPT | Mod: CPTII,S$GLB,, | Performed by: INTERNAL MEDICINE

## 2024-01-26 PROCEDURE — 3078F DIAST BP <80 MM HG: CPT | Mod: CPTII,S$GLB,, | Performed by: INTERNAL MEDICINE

## 2024-01-26 PROCEDURE — 99215 OFFICE O/P EST HI 40 MIN: CPT | Mod: S$GLB,,, | Performed by: INTERNAL MEDICINE

## 2024-01-26 PROCEDURE — 1101F PT FALLS ASSESS-DOCD LE1/YR: CPT | Mod: CPTII,S$GLB,, | Performed by: INTERNAL MEDICINE

## 2024-01-26 PROCEDURE — 3074F SYST BP LT 130 MM HG: CPT | Mod: CPTII,S$GLB,, | Performed by: INTERNAL MEDICINE

## 2024-01-26 PROCEDURE — 1159F MED LIST DOCD IN RCRD: CPT | Mod: CPTII,S$GLB,, | Performed by: INTERNAL MEDICINE

## 2024-01-26 PROCEDURE — 4010F ACE/ARB THERAPY RXD/TAKEN: CPT | Mod: CPTII,S$GLB,, | Performed by: INTERNAL MEDICINE

## 2024-01-26 PROCEDURE — 1126F AMNT PAIN NOTED NONE PRSNT: CPT | Mod: CPTII,S$GLB,, | Performed by: INTERNAL MEDICINE

## 2024-01-26 NOTE — PROGRESS NOTES
Subjective:   01/26/2024     Patient ID:  Apoorva Fernandez is a 69 y.o. female who presents for [facility] Follow Up      Patient here for follow-up after hospitalization for chest pain and hypertension.  Her blood pressure appears better controlled on low-dose diuretic therapy.  Her chest pain has also improved.  She does have chest tightness, this appears to be mild and improved since hospitalization.  In the hospitalization last week, stress echocardiography was normal, she did have a nuclear stress test for follow-up, that showed a small reperfusing inferior wall defect, specificity reduced due to patient's body habitus.  A PET stress test was not performed.  Coronary angiography was not performed.    She does have hypercholesterolemia, history of statin induced myalgia on atorvastatin.  Now, she is being loaded with Q10 prior to initiation of pravastatin.    Blood pressure appears to be well controlled on current diuretic therapy.    Hospital note reviewed:  Apoorva Fernandez is a 69 y.o. female with PMHx of HTN, HLD, obesity, She presents to the ED with chest pain. For about two weeks now she has had substernal chest pressure with associated dyspnea on exertion. She has had to stop when walking very short distances to catch her breath. The chest pain has radiated to her arms and the left jaw/neck. Chest pain/GORDILLO today not relieved with rest and severe. Felt like there was an elephant siting on her chest. After getting 3 nitroglycerin and aspirin in ED the pain improved and felt more like a baby sitting on her chest. Some associated lightheadedness. Has leg swelling but this is more chronic, especially after stopped her fluid pills.  Denies fever, chills, cough, nausea, vomiting. Her BP has not been very well controlled lately and she has been changing medications with her PCP. States she was taking triamterene HCTZ but stopped recently due to her kidney function. Started losartan but then  stopped around 1/10 because of her chest pain that she though may be associated with losartan. Started amlodipine 5 mg 1/18 then stopped due to chest pain. SBP 1/18 recorded 179/67 and 165/72.      In the ED, Initial /85, improved s/p nitroglycerin. Otherwise afebrile and vitals stable. Troponin x2 wnl. BNP 30. D-dimer slightly elevated to 0.75. CBC and CMP unremarkable. EKG NSR, Left axis deviation, RBBB, rate 65. CXR with coarse interstitial attenuation and basilar subsegmental atelectasis bilaterally.  CTA without definite evidence of PE but examination degraded by respiratory motion artifact. She was given aspirin 325 mg, nitroglycerin x3. Admitted to  for chest pain.         Hospital Course:   Pt placed in obserrvation for substernal chest pressure and GORDILLO concerning for ACS. Pt HDS on RA. Troponin negative x2, BNP wnl. D dimer elevated. ECG w/ NSR, RBBB, no gross acute ischemia. CXR and CTA chest unremarkable. DVT US negative. Pt's pain greatly reduced w/ sublingual nitro and asa. Stress echo showed EF 65%, normal diastolic function, normal systolic function, ecg portion negative, no evidence of stress induced ischemia. Test was stopped d/t VT/VF and AV block. Cardiology consulted and recommending NM stress test. Results showed  a mild intensity, small sized, reversible perfusion abnormality that is consistent with ischemia in the mid inferolateral wall(s). abnormal myocardial perfusion scan. Cardiology recommended no LHC at this time, medical management w/ sl nitro, statin, BP control. Cardiology f/u in 1 week. Pt med rec'd and medications were delivered to bedside prior to discharge. Pt medically ready for discharge home. Pt educated on hospital course and plan, verbally agrees and understands. All questions answered.        Goals of Care Treatment Preferences:  Code Status: Full Code        Consults:   Consults (From admission, onward)           Status Ordering Provider        Inpatient consult to  Cardiology  Once       Provider:  (Not yet assigned)   Completed DARREN REYNAGA        Inpatient consult to PICC team ([facility])  Once       Provider:  (Not yet assigned)   Completed FRANKI TAPIA              Cardiac/Vascular  * Chest pain  69F with history of HTN, HLD, obesity presenting with new onset of substernal chest pain and GORDILLO x2 two weeks. Radiates to neck /jaw, arms. Chest pain improved with nitroglycerin. HEART score 6.  - Initially hypertensive but improved and vitals otherwise stable  - Troponin x2 normal. BNP wnl   - EKG without acute ischemic change  - D dimer slightly elevated. CTA negative for PE but limited study.   - S/p aspirin 325 and nitro x3 in ED  - Monitor telemetry   - Stress echo reviewed  - NM stress test positive  - DVT US negative  - Aspirin 81 mg daily  - PRN nitroglycerin         Final Active Diagnoses:    Diagnosis Date Noted POA  · PRINCIPAL PROBLEM:  Chest pain [R07.9] 01/20/2024 Yes  · BELLA (obstructive sleep apnea) [G47.33]   Yes  · Essential hypertension [I10] 04/21/2015 Yes  · Morbid obesity with BMI of 45.0-49.9, adult [E66.01, Z68.42]   Not Applicable  · Other hyperlipidemia [E78.49]   Yes     Problems Resolved During this Admission:        Discharged Condition: good     Disposition: Home or Self Care     Follow Up:      Follow-up Information            [facility] - [facility] Follow up in 1 week(s).   Specialty: Cardiology  Why: Hospital PA follow-up for chest pain; mildly positive SPECT and medically managed  Contact information:  [address]  [facility] [address] [phone]  Additional information:  Cardiology Services Clinics - 3rd floor                      Past Medical History:   Diagnosis Date    Allergic rhinitis     Allergy     Arthritis     Joint pain     Keloid cicatrix     Obesity     Osteoarthritis     Personal history of colonic polyps 08/2010    Adenomatous    Personal history of peptic ulcer disease     PONV (postoperative  nausea and vomiting)     Sleep apnea     Umbilical hernia        Review of patient's allergies indicates:   Allergen Reactions    Amlodipine Other (See Comments)     Chest pressure    Antihistamines - alkylamine Shortness Of Breath    Losartan Shortness Of Breath    Atorvastatin      Other reaction(s): myalgia  Other reaction(s): myalgia    Ezetimibe      Other reaction(s): muscle cramps  Other reaction(s): muscle cramps    Lactose intolerance (lactase) [lactase] Nausea Only         Current Outpatient Medications:     aspirin (ECOTRIN) 81 MG EC tablet, Take 1 tablet (81 mg total) by mouth once daily., Disp: , Rfl:     coenzyme Q10 200 mg capsule, Take 1 capsules (200 mg) by mouth once daily for 14 days., Disp: 14 capsule, Rfl: 0    fluticasone propionate (FLONASE) 50 mcg/actuation nasal spray, SPRAY 2 SPRAYs INTO EACH NOSTRIL ONCE EVERY DAY, Disp: 48 g, Rfl: 1    nitroGLYCERIN (NITROSTAT) 0.4 MG SL tablet, Place 1 tablet (0.4 mg total) under the tongue every 5 (five) minutes as needed for Chest pain., Disp: 25 tablet, Rfl: 0    [START ON 2/7/2024] pravastatin (PRAVACHOL) 40 MG tablet, Take 1 tablet (40 mg total) by mouth once daily., Disp: 90 tablet, Rfl: 3    triamterene-hydrochlorothiazide 37.5-25 mg (MAXZIDE-25) 37.5-25 mg per tablet, Take HALF a tablet by mouth once daily., Disp: 15 tablet, Rfl: 11     Objective:   Review of Systems   Cardiovascular:  Positive for chest pain and dyspnea on exertion. Negative for claudication, cyanosis, irregular heartbeat, leg swelling, near-syncope, orthopnea, palpitations, paroxysmal nocturnal dyspnea and syncope.         Vitals:    01/26/24 1127   BP: 124/75   Pulse: 67     Wt Readings from Last 3 Encounters:   01/26/24 102.7 kg (226 lb 6.6 oz)   01/22/24 103.4 kg (228 lb)   01/08/24 105.8 kg (233 lb 5.7 oz)     Temp Readings from Last 3 Encounters:   01/23/24 97.9 °F (36.6 °C)   01/08/24 97.6 °F (36.4 °C) (Oral)   06/12/23 98.1 °F (36.7 °C) (Oral)     BP Readings from Last 3  Encounters:   01/26/24 124/75   01/23/24 (!) 141/63   01/08/24 138/76     Pulse Readings from Last 3 Encounters:   01/26/24 67   01/23/24 65   01/08/24 67             Physical Exam  Vitals reviewed.   Constitutional:       General: She is not in acute distress.     Appearance: She is well-developed.   HENT:      Head: Normocephalic and atraumatic.      Nose: Nose normal.   Eyes:      Conjunctiva/sclera: Conjunctivae normal.      Pupils: Pupils are equal, round, and reactive to light.   Neck:      Vascular: No carotid bruit or JVD.   Cardiovascular:      Rate and Rhythm: Normal rate and regular rhythm.      Pulses: Normal pulses and intact distal pulses.      Heart sounds: Normal heart sounds. No murmur heard.     No friction rub. No gallop.   Pulmonary:      Effort: Pulmonary effort is normal. No respiratory distress.      Breath sounds: Normal breath sounds. No wheezing or rales.   Chest:      Chest wall: No tenderness.   Abdominal:      General: Bowel sounds are normal. There is no distension.      Palpations: Abdomen is soft.      Tenderness: There is no abdominal tenderness.   Musculoskeletal:         General: No tenderness or deformity. Normal range of motion.      Cervical back: Normal range of motion and neck supple.      Right lower leg: No edema.      Left lower leg: No edema.   Skin:     General: Skin is warm and dry.      Findings: No erythema or rash.   Neurological:      Mental Status: She is alert and oriented to person, place, and time.      Cranial Nerves: No cranial nerve deficit.      Motor: No abnormal muscle tone.      Coordination: Coordination normal.   Psychiatric:         Behavior: Behavior normal.         Thought Content: Thought content normal.         Judgment: Judgment normal.           Lab Results   Component Value Date    CHOL 237 (H) 01/03/2024    CHOL 206 (H) 11/03/2022    CHOL 185 12/10/2021     Lab Results   Component Value Date    HDL 59 01/03/2024    HDL 57 11/03/2022    HDL 57  12/10/2021     Lab Results   Component Value Date    LDLCALC 150.0 01/03/2024    LDLCALC 124.6 11/03/2022    LDLCALC 108.2 12/10/2021     Lab Results   Component Value Date    ALT 18 01/19/2024    AST 16 01/19/2024    AST 15 01/03/2024    AST 17 08/01/2022     Lab Results   Component Value Date    CREATININE 1.0 01/22/2024    BUN 16 01/22/2024     01/22/2024    K 3.7 01/22/2024    CO2 21 (L) 01/22/2024    CO2 23 01/21/2024    CO2 20 (L) 01/20/2024     Lab Results   Component Value Date    HGB 11.5 (L) 01/20/2024    HCT 34.5 (L) 01/20/2024    HCT 38.3 01/19/2024    HCT 39.3 01/03/2024                         Assessment and Plan:     Precordial pain  Comments:  If chest pain continues, would proceed coronary angiography    Mixed hyperlipidemia  Comments:  If statin intolerant, begin therapy with PCSK9 inhibitor    Essential hypertension  Comments:  Well controlled    Morbid obesity with BMI of 45.0-49.9, adult  Comments:  See after visit summary    Statin myopathy         Follow up in about 6 months (around 7/26/2024).          Future Appointments   Date Time Provider Department Center   1/30/2024  1:20 PM PRE-ADMIT NURSE 2, ENDO -[facility] ENDOPP[facility]   2/9/2024  1:00 PM LAB, [facility]

## 2024-01-26 NOTE — PATIENT INSTRUCTIONS
"I recommend the book, "The Obesity Code" for weight loss; it recommends intermittent fasting and avoidance of sugar, artificial sweeteners and refined carbohydrates.    Also, here is information on a Mediterranean type diet including fish, the pesco-mediterranean diet from the American College of Cardiology:    1.  Humans are evolutionarily adapted to obtain calories and nutrients from both plant and animal food sources. Many people overconsume animal products, often-processed meats high in saturated fats and chemical additives. In contrast, while strict veganism has gained popularity for many reasons and has value in certain groups, it can cause nutritional deficiencies (vitamin B12, high-quality proteins, iron, zinc, omega-3 fatty acid, vitamin D, and calcium), and predispose to osteopenia, loss of muscle mass, and anemia. This is not true of a lacto-ovo vegetarian diet, which allows no animal-based food except for eggs and dairy. A 6-year study of [id number] [place] [employer] reported a decreased incidence of all-cause mortality when comparing vegetarians with nonvegetarians. However, when the vegetarians were stratified into vegans, lacto-ovo vegetarians, pesco-vegetarians, and semi-vegetarians, the pesco-vegetarians had lowest risks for all-cause mortality, cardiovascular disease (CVD) mortality, and mortality from other causes.     2.  The authors propose a plant-rich diet rich in nuts with fish and seafood as the principle source of animal food. Known as the Pesco-Mediterranean diet, it is supplemented with extra-virgin olive oil (EVOO), which is the principle fat source, along with moderate amounts of dairy (particularly yogurt and cheese) and eggs, as well as modest amounts of alcohol consumption (ideally red wine with the evening meal), but few red and processed meats.     3.  Both epidemiological studies and randomized clinical trials indicate that the traditional Mediterranean diet is associated with " lower risks for all-cause and CVD mortality, coronary heart disease, metabolic syndrome, diabetes, cognitive decline, neurodegenerative diseases (including Alzheimers), depression, overall cancer mortality, and breast and colorectal cancers.     4.  The traditional Mediterranean diet has been endorsed in the most recent Dietary Guidelines for Americans and the American College of Cardiology/American Heart Association guidelines. The 2020 U[employer]. News & World Report ranked it #1 for overall health based upon it being nutritious, safe, relatively easy to follow, protective against CVD and diabetes, and effective for weight loss.     5.  Fish and seafood are important sources of vitamins protein and omega-3 fatty acids, of which the higher blood and adipose tissue are associated with reduced fatal and nonfatal myocardial infarction. When not fried, fish consumption has been associated with reduced risk of heart failure, and the incidence of the metabolic syndrome, coronary heart disease, ischemic stroke, and sudden cardiac death, particularly when seafood replaces less healthy foods.     6.  Unrestricted use of olive oil in the kitchen, on salads (with vinegar), cooking vegetables, and at the table is the foundation of the traditional Mediterranean diet, although olive oil quality is crucial, which makes it expensive. EVOO retains hydrophilic components of olives including highly bioactive polyphenols, which are believed to underlie many of EVOOs cardiometabolic benefits, such as reduced low-density lipoprotein cholesterol (LDL-C) and increased high-density lipoprotein cholesterol (HDL-C), improved vascular reactivity, enhanced HDL particle functionality, and a lower diabetes risk.     7.  Tree nuts, an integral component of the traditional Mediterranean diet, are nutrient dense rich in unsaturated fats, fiber, protein, polyphenols, phytosterols, and tocopherols. Nut consumption is associated with decreased incidence  and mortality rates from both CVD and coronary artery disease (CAD), as well as atrial fibrillation and diabetes. Randomized controlled trials have shown that diets enriched with nuts produce cardiometabolic benefits including improvements in insulin sensitivity, LDL-C, inflammation, and vascular reactivity. A 1-daily serving of mixed nuts resulted in a 28% reduction in CVD risk. Generous intake of nuts does not promote weight gain because of increased satiety and incomplete digestion.     8.  Legumes are an excellent source of vegetable protein, folate, and magnesium and fiber, and like other seeds including peanuts, are rich in polyphenols. Consumption of legumes has been linked to a reduced risk of incident and fatal CVD and CAD, as well as improvements in blood glucose, cholesterol, blood pressure, and body weight. Legumes, like fish, are a satiating and healthy substitute for red meat and processed meats.     9.  Dairy products and eggs are important sources of protein, nonsodium minerals, probiotics, and vitamin D. Although there is no clear consensus among nutrition experts on the role of dairy products in CVD risk, they are allowed in this Pesco-Mediterranean diet. Fermented low-fat versions, such as yogurt and soft cheeses, are preferred; butter and hard cheese are high in saturated fats and salt.     10.  Eggs are composed of beneficial nutrients including all essential amino acids, in addition to minerals (selenium, phosphorus, iodine, zinc), vitamins (A, D, B2, B12, niacin), and carotenoids (lutein, zeaxanthin). Although each yolk contains about 184 mg of dietary cholesterol, large prospective cohorts suggest that egg consumption is unrelated to serum cholesterol and does not increase CVD risk. Eggs are allowed in the Pesco-Mediterranean diet; egg whites are unlimited and preferably no more than 5 yolks/week.     11.  Whole grains, such as barley, whole oats, rye, corn, buckwheat, brown rice, and quinoa,  are an integral part of the traditional Mediterranean diet. Pasta is an example of a starchy food that has a low glycemic index despite being a refined carbohydrate. In the context of a low glycemic index dietary pattern such as the Mediterranean diet, pasta does not adversely affect adiposity and may even help reduce body weight and there is no evidence that pasta promotes cardiometabolic risk factors. White rice is associated with type 2 diabetes mellitus in Asians but not in Western cohorts, possibly because it is cooked and served plain in [place] and in Western cultures cooked in mixed dishes with vegetables and vegetable oil including EVOO.     12.  The staple beverage of the Pesco-Mediterranean diet is water--which can be flavored but not sweetened. Unsweetened tea and coffee are rich in antioxidants and are associated with improved CVD outcomes. If alcohol is consumed at all, dry red wine is recommended, with the ideal amount being a single glass (6 oz) for women and 1 or 2 glasses/day for men (6-12 oz) consumed with meals.     13.  Time-restricted eating, a type of intermittent fasting, is the practice of limiting the daily intake of calories to a window of time usually between 6-12 hours each day. Intermittent fasting when done on a regular basis has been shown to decrease intra-abdominal adipose tissue and reduce free-radical production. This elicits powerful cellular responses that improve glucose metabolism and reduce systemic inflammation, and may also reduce risks of diabetes, CVD, cancer, and neurodegenerative diseases. After a 12-hour overnight fast, insulin levels are typically low, and glycogen stores have been depleted. In this fasted state, the body starts mobilizing fatty acids from adipose cells to burn as metabolic fuel instead of glucose. This improves insulin sensitivity. Time-restricted eating is not more effective for weight loss than standard calorie-restriction, but appears to enhance CV  health even in nonobese people. Fasting may also lower blood pressure and resting heart rate and improve autonomic balance with augmented heart rate variability.     14.  The evidence regarding time-restricted eating is mostly based on animal models and observational human studies. The most popular form of time-restricted eating involves eating two rather than three meals and compressing the calorie-consumption window. No head-to-head studies have been performed to assess the optimal time window.

## 2024-01-30 ENCOUNTER — CLINICAL SUPPORT (OUTPATIENT)
Dept: ENDOSCOPY | Facility: HOSPITAL | Age: 69
End: 2024-01-30
Attending: INTERNAL MEDICINE
Payer: COMMERCIAL

## 2024-01-30 ENCOUNTER — TELEPHONE (OUTPATIENT)
Dept: ENDOSCOPY | Facility: HOSPITAL | Age: 69
End: 2024-01-30

## 2024-01-30 DIAGNOSIS — Z12.11 ENCOUNTER FOR COLORECTAL CANCER SCREENING: ICD-10-CM

## 2024-01-30 DIAGNOSIS — Z12.12 ENCOUNTER FOR COLORECTAL CANCER SCREENING: ICD-10-CM

## 2024-01-30 RX ORDER — POLYETHYLENE GLYCOL 3350, SODIUM SULFATE ANHYDROUS, SODIUM BICARBONATE, SODIUM CHLORIDE, POTASSIUM CHLORIDE 236; 22.74; 6.74; 5.86; 2.97 G/4L; G/4L; G/4L; G/4L; G/4L
4 POWDER, FOR SOLUTION ORAL ONCE
Qty: 4000 ML | Refills: 0 | Status: SHIPPED | OUTPATIENT
Start: 2024-01-30 | End: 2024-01-30

## 2024-01-30 NOTE — TELEPHONE ENCOUNTER
Spoke to patient to schedule procedure(s) Colonoscopy       Physician to perform procedure(s) Dr. BECKA Jackson  Date of Procedure (s) 5/14/24  Arrival Time 10:00 AM  Time of Procedure(s) 11:00 AM   Location of Procedure(s) [facility]--  Enter at the rear of the building through the emergency department screening station or the Outpatient Registration door, then continue to endoscopy department on the 2nd floor.    Type of Rx Prep sent to patient: PEG  Instructions provided to patient via [facility]    Patient was informed on the following information and verbalized understanding. Screening questionnaire reviewed with patient and complete. If procedure requires anesthesia, a responsible adult needs to be present to accompany the patient home, patient cannot drive after receiving anesthesia. Appointment details are tentative, especially check-in time. Patient will receive a prep-op call 7 days prior to confirm check-in time for procedure. If applicable the patient should contact their pharmacy to verify Rx for procedure prep is ready for pick-up. Patient was advised to call the scheduling department at [phone] if pharmacy states no Rx is available. Patient was advised to call the endoscopy scheduling department if any questions or concerns arise.      [facility] Endoscopy Scheduling Department

## 2024-02-09 ENCOUNTER — LAB VISIT (OUTPATIENT)
Dept: LAB | Facility: HOSPITAL | Age: 69
End: 2024-02-09
Attending: INTERNAL MEDICINE
Payer: COMMERCIAL

## 2024-02-09 DIAGNOSIS — I10 ESSENTIAL HYPERTENSION: ICD-10-CM

## 2024-02-09 LAB
ANION GAP SERPL CALC-SCNC: 8 MMOL/L (ref 8–16)
BUN SERPL-MCNC: 17 MG/DL (ref 8–23)
CALCIUM SERPL-MCNC: 9.7 MG/DL (ref 8.7–10.5)
CHLORIDE SERPL-SCNC: 102 MMOL/L (ref 95–110)
CO2 SERPL-SCNC: 27 MMOL/L (ref 23–29)
CREAT SERPL-MCNC: 1 MG/DL (ref 0.5–1.4)
EST. GFR  (NO RACE VARIABLE): >60 ML/MIN/1.73 M^2
GLUCOSE SERPL-MCNC: 94 MG/DL (ref 70–110)
POTASSIUM SERPL-SCNC: 3.7 MMOL/L (ref 3.5–5.1)
SODIUM SERPL-SCNC: 137 MMOL/L (ref 136–145)

## 2024-02-09 PROCEDURE — 36415 COLL VENOUS BLD VENIPUNCTURE: CPT | Mod: PO | Performed by: INTERNAL MEDICINE

## 2024-02-09 PROCEDURE — 80048 BASIC METABOLIC PNL TOTAL CA: CPT | Performed by: INTERNAL MEDICINE

## 2024-03-08 ENCOUNTER — PATIENT MESSAGE (OUTPATIENT)
Dept: CARDIOLOGY | Facility: CLINIC | Age: 69
End: 2024-03-08
Payer: COMMERCIAL

## 2024-03-27 ENCOUNTER — PATIENT OUTREACH (OUTPATIENT)
Dept: ADMINISTRATIVE | Facility: HOSPITAL | Age: 69
End: 2024-03-27
Payer: COMMERCIAL

## 2024-03-27 RX ORDER — POLYETHYLENE GLYCOL-3350 AND ELECTROLYTES 236; 6.74; 5.86; 2.97; 22.74 G/274.31G; G/274.31G; G/274.31G; G/274.31G; G/274.31G
4000 POWDER, FOR SOLUTION ORAL ONCE
COMMUNITY
Start: 2024-01-30

## 2024-03-27 NOTE — PROGRESS NOTES
Eye exam received and uploaded into Media.    Overdue Colonoscopy - colonoscopy scheduled on 05/14/2024.

## 2024-04-30 ENCOUNTER — PATIENT MESSAGE (OUTPATIENT)
Dept: FAMILY MEDICINE | Facility: CLINIC | Age: 69
End: 2024-04-30
Payer: COMMERCIAL

## 2024-05-07 ENCOUNTER — OFFICE VISIT (OUTPATIENT)
Dept: URGENT CARE | Facility: CLINIC | Age: 69
End: 2024-05-07
Payer: COMMERCIAL

## 2024-05-07 VITALS
HEART RATE: 64 BPM | BODY MASS INDEX: 43.98 KG/M2 | OXYGEN SATURATION: 95 % | DIASTOLIC BLOOD PRESSURE: 84 MMHG | WEIGHT: 224 LBS | RESPIRATION RATE: 16 BRPM | HEIGHT: 60 IN | SYSTOLIC BLOOD PRESSURE: 138 MMHG | TEMPERATURE: 98 F

## 2024-05-07 DIAGNOSIS — J01.90 ACUTE BACTERIAL SINUSITIS: Primary | ICD-10-CM

## 2024-05-07 DIAGNOSIS — T36.95XA ANTIBIOTIC-INDUCED YEAST INFECTION: ICD-10-CM

## 2024-05-07 DIAGNOSIS — B37.9 ANTIBIOTIC-INDUCED YEAST INFECTION: ICD-10-CM

## 2024-05-07 DIAGNOSIS — R05.9 COUGH, UNSPECIFIED TYPE: ICD-10-CM

## 2024-05-07 DIAGNOSIS — B96.89 ACUTE BACTERIAL SINUSITIS: Primary | ICD-10-CM

## 2024-05-07 PROCEDURE — 99213 OFFICE O/P EST LOW 20 MIN: CPT | Mod: S$GLB,,,

## 2024-05-07 RX ORDER — AMOXICILLIN AND CLAVULANATE POTASSIUM 875; 125 MG/1; MG/1
1 TABLET, FILM COATED ORAL 2 TIMES DAILY
Qty: 14 TABLET | Refills: 0 | Status: SHIPPED | OUTPATIENT
Start: 2024-05-07 | End: 2024-05-14

## 2024-05-07 RX ORDER — PROMETHAZINE HYDROCHLORIDE AND DEXTROMETHORPHAN HYDROBROMIDE 6.25; 15 MG/5ML; MG/5ML
5 SYRUP ORAL EVERY 6 HOURS PRN
Qty: 118 ML | Refills: 0 | Status: SHIPPED | OUTPATIENT
Start: 2024-05-07 | End: 2024-05-17

## 2024-05-07 RX ORDER — FLUCONAZOLE 150 MG/1
150 TABLET ORAL ONCE
Qty: 2 TABLET | Refills: 0 | Status: SHIPPED | OUTPATIENT
Start: 2024-05-07 | End: 2024-05-07

## 2024-05-07 NOTE — PATIENT INSTRUCTIONS
- Rest.    - Drink plenty of fluids.     - You can take over-the-counter claritin, zyrtec, allegra, or xyzal as directed. These are antihistamines that can help with runny nose, nasal congestion, sneezing, and helps to dry up post-nasal drip, which usually causes sore throat and cough.              - If you do NOT have high blood pressure, you may use a decongestant form (D)  of this medication (ie. Claritin- D, zyrtec-D, allegra-D) or if you do not take the D form, you can take sudafed (pseudoephedrine) over the counter, which is a decongestant. Do NOT take two decongestant (D) medications at the same time (such as mucinex-D and claritin-D or plain sudafed and claritin D)    - If you DO have Hypertension, anxiety, or palpitations, it is safe to take Coricidin HBP for relief of sinus symptoms.     - You can use Flonase (fluticasone) nasal spray as directed for sinus congestion and postnasal drip. This is a steroid nasal spray that works locally over time to decrease the inflammation in your nose/sinuses and help with allergic symptoms. This is not an quick- relief spray like afrin, but it works well if used daily.  Discontinue if you develop nose bleed  - use nasal saline prior to Flonase.  - Use Ocean Spray Nasal Saline 1-3 puffs each nostril every 2-3 hours then blow out onto tissue. This is to irrigate the nasal passage way to clear the sinus openings. Use until sinus problem resolved.     - you can take plain Mucinex (guaifenesin) 1200 mg twice a day to help loosen mucous.      -warm salt water gargles can help with sore throat     - warm tea with honey can help with cough. Honey is a natural cough suppressant.     - Dextromethorphan (DM) is a cough suppressant over the counter (ie. mucinex DM, robitussin, delsym; dayquil/nyquil has DM as well.)        - Follow up with your PCP or specialty clinic as directed in the next 1-2 weeks if not improved or as needed.  You can call [phone] to schedule an  appointment with the appropriate provider.       - Go to the ER if you develop new or worsening symptoms.      - You must understand that you have received an Urgent Care treatment only and that you may be released before all of your medical problems are known or treated.   - You, the patient, will arrange for follow up care as instructed.   - If your condition worsens or fails to improve we recommend that you receive another evaluation at the ER immediately or contact your PCP to discuss your concerns or return here.

## 2024-05-07 NOTE — PROGRESS NOTES
Subjective:      Patient ID: Apoorva Fernandez is a 69 y.o. female.    Vitals:  height is 5' (1.524 m) and weight is 101.6 kg (224 lb). Her oral temperature is 98 °F (36.7 °C). Her blood pressure is 138/84 and her pulse is 64. Her respiration is 16 and oxygen saturation is 95%.     Chief Complaint: Cough    Pt is a 68 y/o F who presents with coughing and congestion x1 month. Taking mucinex and robitussin. Denies fever, chills, n/v/d, abdominal pain, diarrhea, chest pain, SoB, dizziness.    Cough  This is a chronic problem. The current episode started more than 1 month ago. The problem has been unchanged. The problem occurs hourly. The cough is Productive of sputum. Associated symptoms include ear pain and nasal congestion. Pertinent negatives include no chest pain, chills, fever, headaches, myalgias, rash, sore throat or shortness of breath. Nothing aggravates the symptoms. She has tried OTC cough suppressant for the symptoms. The treatment provided no relief.       Constitution: Negative for chills and fever.   HENT:  Positive for ear pain and congestion. Negative for sore throat.    Neck: Negative for neck pain and neck stiffness.   Cardiovascular:  Negative for chest pain.   Respiratory:  Positive for cough. Negative for shortness of breath.    Gastrointestinal:  Negative for abdominal pain, nausea, vomiting and diarrhea.   Genitourinary:  Negative for dysuria.   Musculoskeletal:  Negative for muscle ache.   Skin:  Negative for rash.   Allergic/Immunologic: Negative for sneezing.   Neurological:  Negative for dizziness and headaches.      Objective:     Physical Exam   Constitutional: She is oriented to person, place, and time. She appears well-developed.   HENT:   Head: Normocephalic and atraumatic.   Ears:   Right Ear: Tympanic membrane, external ear and ear canal normal.   Left Ear: Tympanic membrane, external ear and ear canal normal.   Nose: Nose normal.   Mouth/Throat: Oropharynx is clear and moist.  Mucous membranes are moist. Oropharynx is clear.   Eyes: Conjunctivae, EOM and lids are normal. Pupils are equal, round, and reactive to light.   Neck: Trachea normal and phonation normal. Neck supple.   Cardiovascular: Normal rate, regular rhythm, normal heart sounds and normal pulses.   Pulmonary/Chest: Effort normal and breath sounds normal. No respiratory distress. She has no wheezes. She has no rales.   Musculoskeletal: Normal range of motion.         General: Normal range of motion.   Neurological: no focal deficit. She is alert and oriented to person, place, and time.   Skin: Skin is warm, dry and intact. Capillary refill takes less than 2 seconds.   Psychiatric: Her speech is normal and behavior is normal. Judgment and thought content normal.   Nursing note and vitals reviewed.      Assessment:     1. Acute bacterial sinusitis    2. Cough, unspecified type    3. Antibiotic-induced yeast infection        Plan:       Acute bacterial sinusitis  -     amoxicillin-clavulanate 875-125mg (AUGMENTIN) 875-125 mg per tablet; Take 1 tablet by mouth 2 (two) times daily. for 7 days  Dispense: 14 tablet; Refill: 0    Cough, unspecified type  -     promethazine-dextromethorphan (PROMETHAZINE-DM) 6.25-15 mg/5 mL Syrp; Take 5 mLs by mouth every 6 (six) hours as needed (cough).  Dispense: 118 mL; Refill: 0    Antibiotic-induced yeast infection  -     fluconazole (DIFLUCAN) 150 MG Tab; Take 1 tablet (150 mg total) by mouth once. If still experiencing symptoms, then may take an additional dose in 3 days. for 1 dose  Dispense: 2 tablet; Refill: 0                  Patient Instructions   - Rest.    - Drink plenty of fluids.     - You can take over-the-counter claritin, zyrtec, allegra, or xyzal as directed. These are antihistamines that can help with runny nose, nasal congestion, sneezing, and helps to dry up post-nasal drip, which usually causes sore throat and cough.              - If you do NOT have high blood pressure, you may  use a decongestant form (D)  of this medication (ie. Claritin- D, zyrtec-D, allegra-D) or if you do not take the D form, you can take sudafed (pseudoephedrine) over the counter, which is a decongestant. Do NOT take two decongestant (D) medications at the same time (such as mucinex-D and claritin-D or plain sudafed and claritin D)    - If you DO have Hypertension, anxiety, or palpitations, it is safe to take Coricidin HBP for relief of sinus symptoms.     - You can use Flonase (fluticasone) nasal spray as directed for sinus congestion and postnasal drip. This is a steroid nasal spray that works locally over time to decrease the inflammation in your nose/sinuses and help with allergic symptoms. This is not an quick- relief spray like afrin, but it works well if used daily.  Discontinue if you develop nose bleed  - use nasal saline prior to Flonase.  - Use Ocean Spray Nasal Saline 1-3 puffs each nostril every 2-3 hours then blow out onto tissue. This is to irrigate the nasal passage way to clear the sinus openings. Use until sinus problem resolved.     - you can take plain Mucinex (guaifenesin) 1200 mg twice a day to help loosen mucous.      -warm salt water gargles can help with sore throat     - warm tea with honey can help with cough. Honey is a natural cough suppressant.     - Dextromethorphan (DM) is a cough suppressant over the counter (ie. mucinex DM, robitussin, delsym; dayquil/nyquil has DM as well.)        - Follow up with your PCP or specialty clinic as directed in the next 1-2 weeks if not improved or as needed.  You can call [phone] to schedule an appointment with the appropriate provider.       - Go to the ER if you develop new or worsening symptoms.      - You must understand that you have received an Urgent Care treatment only and that you may be released before all of your medical problems are known or treated.   - You, the patient, will arrange for follow up care as instructed.   - If your  condition worsens or fails to improve we recommend that you receive another evaluation at the ER immediately or contact your PCP to discuss your concerns or return here.

## 2024-05-08 ENCOUNTER — TELEPHONE (OUTPATIENT)
Dept: GASTROENTEROLOGY | Facility: CLINIC | Age: 69
End: 2024-05-08
Payer: COMMERCIAL

## 2024-05-08 ENCOUNTER — TELEPHONE (OUTPATIENT)
Dept: ENDOSCOPY | Facility: HOSPITAL | Age: 69
End: 2024-05-08
Payer: COMMERCIAL

## 2024-05-08 ENCOUNTER — TELEPHONE (OUTPATIENT)
Dept: FAMILY MEDICINE | Facility: CLINIC | Age: 69
End: 2024-05-08
Payer: COMMERCIAL

## 2024-05-08 NOTE — TELEPHONE ENCOUNTER
Spoke with patient requesting lab orders for her Annual in Jan 2025. Instructed patient to call back in December of 2024 to get scheduled for her Annual labs.

## 2024-05-08 NOTE — TELEPHONE ENCOUNTER
----- Message from Curry Jackson MD sent at 5/8/2024  9:00 AM CDT -----  Contact: [phone]  Thank you  ----- Message -----  From: Dayday Delaney MA  Sent: 5/8/2024   8:59 AM CDT  To: Curry Jackson MD Dr. Manuel she is taking antibiotics for   1. Acute bacterial sinusitis   2. Cough, unspecified type   3. Antibiotic-induced yeast infection   I will reschedule her for a later date.  ----- Message -----  From: Curry Jackson MD  Sent: 5/8/2024   8:02 AM CDT  To: Dayday Delaney MA; Annie Carney MA    If she is on antibiotics for a pulmonary process, I recommend re-scheduling colonoscopy so that she is optimized when she receives anesthesia.  ----- Message -----  From: Annie Carney MA  Sent: 5/7/2024   2:57 PM CDT  To: Curry Jackson MD    Please review/advise, thank you.    Returned patients call, she states that she is scheduled for he Colonoscopy on 5/14, she just started antibiotics for Bronchitis today and will be on them for 10 days. Patient would like to know if she can proceed with the procedure?  ----- Message -----  From: Tiffany Abebe  Sent: 5/7/2024  12:01 PM CDT  To: Manuel ALVAREZ calling regarding Patient Advice (message) Pt asking for a call back she states she just stated her antibiotics but has a procedure schedule for 05/14 Pt asking for a call back

## 2024-05-08 NOTE — TELEPHONE ENCOUNTER
----- Message from Fina Moffett sent at 5/8/2024 12:22 PM CDT -----  Regarding: self  [phone]  Type: Lab    Caller is requesting to schedule their Lab appointment prior to annual appointment.    Order is not listed in EPIC.  Please enter order and contact patient to schedule.    Name of Caller:self    Preferred Date and Time of Labs:anytime    Date of [facility] Appointment: 1/15125    Where would they like the lab performed? [facility]    Would the patient rather a call back or a response via [facility]? Call back    Best Call Back Number: [phone]

## 2024-05-08 NOTE — TELEPHONE ENCOUNTER
Patient returned call, let her know that per provider it is recommended that her procedure be rescheduled due to being on the Abx for Bronchitis and that the endo  will contact her, she verbalized understanding.

## 2024-05-08 NOTE — TELEPHONE ENCOUNTER
----- Message from Curry Jackson MD sent at 5/8/2024  8:01 AM CDT -----  Contact: [phone]  If she is on antibiotics for a pulmonary process, I recommend re-scheduling colonoscopy so that she is optimized when she receives anesthesia.  ----- Message -----  From: Annie Carney MA  Sent: 5/7/2024   2:57 PM CDT  To: Curry Jackson MD    Please review/advise, thank you.    Returned patients call, she states that she is scheduled for he Colonoscopy on 5/14, she just started antibiotics for Bronchitis today and will be on them for 10 days. Patient would like to know if she can proceed with the procedure?  ----- Message -----  From: Tiffany Abebe  Sent: 5/7/2024  12:01 PM CDT  To: Manuel Zapien Staff    ALMA ALVAREZ calling regarding Patient Advice (message) Pt asking for a call back she states she just stated her antibiotics but has a procedure schedule for 05/14 Pt asking for a call back

## 2024-05-08 NOTE — TELEPHONE ENCOUNTER
Return call to patient to reschedule colonoscopy. Patient has been rescheduled. Patient has picked up rx from pharmacy. Updated prep instructions sent to the portal.   Patient verbalized understanding.

## 2024-06-05 ENCOUNTER — TELEPHONE (OUTPATIENT)
Dept: ENDOSCOPY | Facility: HOSPITAL | Age: 69
End: 2024-06-05
Payer: COMMERCIAL

## 2024-06-05 NOTE — TELEPHONE ENCOUNTER
Left voicemail and sent portal message for patient to call Endoscopy Scheduling to review instructions and confirm appointment for Colonoscopy on 6/12/24.

## 2024-06-06 ENCOUNTER — TELEPHONE (OUTPATIENT)
Dept: ENDOSCOPY | Facility: HOSPITAL | Age: 69
End: 2024-06-06
Payer: COMMERCIAL

## 2024-06-06 NOTE — TELEPHONE ENCOUNTER
Left 2nd voicemail on home and mobile numbers for patient to call Endoscopy Scheduling to review instructions and confirm appointment for Colonoscopy on 6/12/24.

## 2024-06-06 NOTE — TELEPHONE ENCOUNTER
Pt returned call to confirm colonoscopy. Pt has prep and instructions and confirmed ride. Verified if any GLP1's and blood thinners. Encouraged to call back for any needs. Note made in case comments.

## 2024-06-11 ENCOUNTER — OFFICE VISIT (OUTPATIENT)
Dept: FAMILY MEDICINE | Facility: CLINIC | Age: 69
End: 2024-06-11
Payer: COMMERCIAL

## 2024-06-11 ENCOUNTER — ANESTHESIA EVENT (OUTPATIENT)
Dept: ENDOSCOPY | Facility: HOSPITAL | Age: 69
End: 2024-06-11
Payer: COMMERCIAL

## 2024-06-11 VITALS
DIASTOLIC BLOOD PRESSURE: 80 MMHG | WEIGHT: 223.31 LBS | HEIGHT: 60 IN | SYSTOLIC BLOOD PRESSURE: 128 MMHG | HEART RATE: 68 BPM | TEMPERATURE: 98 F | OXYGEN SATURATION: 98 % | BODY MASS INDEX: 43.84 KG/M2

## 2024-06-11 DIAGNOSIS — Z01.818 PREOPERATIVE EXAMINATION: Primary | ICD-10-CM

## 2024-06-11 DIAGNOSIS — E66.01 SEVERE OBESITY (BMI >= 40): ICD-10-CM

## 2024-06-11 DIAGNOSIS — G47.33 OSA (OBSTRUCTIVE SLEEP APNEA): ICD-10-CM

## 2024-06-11 DIAGNOSIS — I10 ESSENTIAL HYPERTENSION: ICD-10-CM

## 2024-06-11 PROCEDURE — 99999 PR PBB SHADOW E&M-EST. PATIENT-LVL III: CPT | Mod: PBBFAC,,,

## 2024-06-11 PROCEDURE — 1160F RVW MEDS BY RX/DR IN RCRD: CPT | Mod: CPTII,S$GLB,,

## 2024-06-11 PROCEDURE — 3288F FALL RISK ASSESSMENT DOCD: CPT | Mod: CPTII,S$GLB,,

## 2024-06-11 PROCEDURE — 99214 OFFICE O/P EST MOD 30 MIN: CPT | Mod: S$GLB,,,

## 2024-06-11 PROCEDURE — 1101F PT FALLS ASSESS-DOCD LE1/YR: CPT | Mod: CPTII,S$GLB,,

## 2024-06-11 PROCEDURE — 3079F DIAST BP 80-89 MM HG: CPT | Mod: CPTII,S$GLB,,

## 2024-06-11 PROCEDURE — 1159F MED LIST DOCD IN RCRD: CPT | Mod: CPTII,S$GLB,,

## 2024-06-11 PROCEDURE — 4010F ACE/ARB THERAPY RXD/TAKEN: CPT | Mod: CPTII,S$GLB,,

## 2024-06-11 PROCEDURE — 3074F SYST BP LT 130 MM HG: CPT | Mod: CPTII,S$GLB,,

## 2024-06-11 PROCEDURE — 3008F BODY MASS INDEX DOCD: CPT | Mod: CPTII,S$GLB,,

## 2024-06-11 RX ORDER — LIDOCAINE HYDROCHLORIDE 10 MG/ML
1 INJECTION, SOLUTION EPIDURAL; INFILTRATION; INTRACAUDAL; PERINEURAL ONCE
OUTPATIENT
Start: 2024-06-11 | End: 2024-06-11

## 2024-06-11 NOTE — PROGRESS NOTES
HPI     Chief Complaint:  Chief Complaint   Patient presents with    Pre-op Exam       Apoorva Fernandez is a 69 y.o. female with multiple medical diagnoses as listed in the medical history and problem list that presents for   Chief Complaint   Patient presents with    Pre-op Exam    .     Patient is not known to me with her last appointment in this department on 2024.     HPI  This patient is new to me  Procedure to be performed: cataract surgery on 24  Pt states that she has had no limitations in activities.   she has had prior  x2, hernia x2, abdominal, right knee surgery without any perioperative complications. Denies issues with anesthesia.    Patient is not a smoker.  Does not take any blood-thinning medications.  is able to  without getting CP/SOB/GORDILLO.  No personal Hx of cardiac diseases  Denies F/C/N/V/palpitations/claudication.  Denies weakness/tingling/numbness/vertigo/unsteadiness/changes in mental status/blackouts.      Preop Assessment    Pulmonary risk factors: Reports BELLA  Systemic risk factors: Reports hypertension    Revised Cardiac Risk Index for Pre-Operative Risk = 3.9%    Preoperative Mortality Predictor () Score = 5 points    The 10-year ASCVD risk score (Chuckie FARNSWORTH, et al., 2019) is: 13.1%    Values used to calculate the score:      Age: 69 years      Sex: Female      Is Non- : No      Diabetic: No      Tobacco smoker: No      Systolic Blood Pressure: 128 mmHg      Is BP treated: Yes      HDL Cholesterol: 59 mg/dL      Total Cholesterol: 237 mg/dL    ROS, Vitals reviewed.   Patient has acceptable exercise capacity as demonstrated in the office today.    Able to achieve 4 METs. RSI Class III (6.6% risk).    It is acceptable to hold Aspirin and Plavix perioperatively, and resume them after the patient has recovered at the direction of surgeon.    Cataract surgery Aspirin should be stopped at least a week in advance. Plavix® and Warfarin/Coumadin®  should be stopped 5 days before. Those on NOACs (Pradaxa®, Xarelto®, Eliquis®, Savayasa®, etc.) need to stop them at least 2 days before surgery.     Patient has no hx of nor symptoms suggestive of myocardial ischemia, heart failure, arrhythmia and CVA.     No pre-op labs required by surgeon, but surgeon may order any tests at his/her discretion    Labs reviewed:  Creatinine is below 2 mg/dl.         pt is optimized for surgery from a primary care standpoint and is at low risk for archana-operative CV event.        Assessment & Plan     Problem List Items Addressed This Visit          Cardiac/Vascular    Essential hypertension  BP in clinic today 128/80. The current medical regimen is effective;  continue present plan and medications.       Other    BELLA (obstructive sleep apnea)  Stable. Not currently using cpap, sleeps elevated in upright position which manages BELLA.    Overview     Pt will need new home sleep study in order to qualify for cpap again. Pt states can't afford at moment. She will contact us back when she has funding. In the meantime, she is working on diet, exercise and weight loss.           Other Visit Diagnoses       Preoperative examination    -  Primary  Preop for cataracts today. BP in clinic today 128/80. BELLA stable. Most recent CBC and CMP results WNL. Pt is cleared for cataract surgery from primary care standpoint.      Severe obesity (BMI >= 40)      Previously had significant weight loss but has gained it all back. Continue healthy diet and exercise for weight loss.                --------------------------------------------      Health Maintenance:  Health Maintenance         Date Due Completion Date    RSV Vaccine (Age 60+ and Pregnant patients) (1 - 1-dose 60+ series) Never done ---    COVID-19 Vaccine (6 - 2023-24 season) 01/22/2024 9/22/2023    Colorectal Cancer Screening 08/13/2024 (Originally 1955) 3/26/2019    TETANUS VACCINE 06/11/2025 (Originally 7/15/2018) 7/15/2008    Mammogram  2025    Hemoglobin A1c (Diabetic Prevention Screening) 2025    Lipid Panel 2029 1/3/2024    DEXA Scan 2030            Health maintenance reviewed  Colonoscopy schedule for 24.    Follow Up:  No follow-ups on file.    Exam     Review of Systems:  (as noted above)  Review of Systems    Physical Exam:   Physical Exam  Constitutional:       General: She is not in acute distress.     Appearance: She is obese. She is not ill-appearing or diaphoretic.   HENT:      Head: Normocephalic and atraumatic.   Cardiovascular:      Rate and Rhythm: Normal rate and regular rhythm.   Pulmonary:      Effort: Pulmonary effort is normal. No respiratory distress.   Chest:      Chest wall: No tenderness.   Musculoskeletal:      Right ankle: Swelling present.      Left ankle: Swelling present.      Right foot: Swelling present.      Left foot: Swelling present.   Neurological:      Mental Status: She is alert and oriented to person, place, and time.       Vitals:    24 0941   BP: 128/80   BP Location: Right arm   Patient Position: Sitting   BP Method: Large (Manual)   Pulse: 68   Temp: 97.7 °F (36.5 °C)   TempSrc: Oral   SpO2: 98%   Weight: 101.3 kg (223 lb 5.2 oz)   Height: 5' (1.524 m)      Body mass index is 43.62 kg/m².        History     Past Medical History:  Past Medical History:   Diagnosis Date    Allergic rhinitis     Allergy     Arthritis     Joint pain     Keloid cicatrix     Obesity     Osteoarthritis     Personal history of colonic polyps 2010    Adenomatous    Personal history of peptic ulcer disease     PONV (postoperative nausea and vomiting)     Sleep apnea     Umbilical hernia        Past Surgical History:  Past Surgical History:   Procedure Laterality Date    Abdominal/umbilical hernia repair      removed small part of small bowel; mesh placed     SECTION, LOW TRANSVERSE      X2    CHOLECYSTECTOMY      COLONOSCOPY N/A 3/26/2019    Procedure:  COLONOSCOPY;  Surgeon: Chandrika Ho MD;  Location: [facility] ENDO;  Service: Endoscopy;  Laterality: N/A;    Open laparoscopy for a ruptured ulcer      TOTAL KNEE ARTHROPLASTY Right 8/15/2022    Procedure: ARTHROPLASTY, KNEE, TOTAL- JOAN;  Surgeon: Jass Willis MD;  Location: [facility] OR;  Service: Orthopedics;  Laterality: Right;  NAE MIL SALGADO [phone] TEXTED HIM @ 7:06AM  ON 7-22-20227:06AM ON 7-  1ST ASSIST SUSAN [phone] TEXTED HER @ 7:16AM ON 7- REPLIED @ 7:18AM ON 7-  NOON START  SUPINE  RN PREOP 8/1/2022    T/S ON 8/12/2022       HAS MEDICAL TESHA       Social History:  Social History     Socioeconomic History    Marital status:     Number of children: 2   Occupational History    Occupation:    Tobacco Use    Smoking status: Never    Smokeless tobacco: Never   Substance and Sexual Activity    Alcohol use: Yes     Alcohol/week: 2.0 - 3.0 standard drinks of alcohol     Types: 2 - 3 Glasses of wine per week     Comment: Socially on the weekends    Drug use: No    Sexual activity: Yes     Partners: Male     Social Determinants of Health     Financial Resource Strain: Low Risk  (1/19/2024)    Overall Financial Resource Strain (CARDIA)     Difficulty of Paying Living Expenses: Not hard at all   Food Insecurity: No Food Insecurity (1/19/2024)    Hunger Vital Sign     Worried About Running Out of Food in the Last Year: Never true     Ran Out of Food in the Last Year: Never true   Transportation Needs: No Transportation Needs (1/19/2024)    PRAPARE - Transportation     Lack of Transportation (Medical): No     Lack of Transportation (Non-Medical): No   Physical Activity: Inactive (1/19/2024)    Exercise Vital Sign     Days of Exercise per Week: 0 days     Minutes of Exercise per Session: 0 min   Stress: No Stress Concern Present (1/19/2024)    [employer] - Occupational Stress Questionnaire     Feeling of Stress : Not at all    Housing Stability: Low Risk  (2024)    Housing Stability Vital Sign     Unable to Pay for Housing in the Last Year: No     Number of Places Lived in the Last Year: 1     Unstable Housing in the Last Year: No       Family History:  Family History   Problem Relation Name Age of Onset    Breast cancer Mother Jackie Camacho          at age 65    Cancer Father          Bladder    Glaucoma Father      Hypertension Father      Heart disease Father      Obesity Sister Suyapa     Heart disease Brother Pankaj     Sleep apnea Brother Reji     Diabetes Neg Hx      Melanoma Neg Hx         Allergies and Medications: (updated and reviewed)  Review of patient's allergies indicates:   Allergen Reactions    Amlodipine Other (See Comments)     Chest pressure    Antihistamines - alkylamine Shortness Of Breath    Losartan Shortness Of Breath    Atorvastatin      Other reaction(s): myalgia  Other reaction(s): myalgia    Ezetimibe      Other reaction(s): muscle cramps  Other reaction(s): muscle cramps    Pravastatin Other (See Comments)      myalgia    Lactose intolerance (lactase) [lactase] Nausea Only     Current Outpatient Medications   Medication Sig Dispense Refill    aspirin (ECOTRIN) 81 MG EC tablet Take 1 tablet (81 mg total) by mouth once daily.      coenzyme Q10 200 mg capsule Take 1 capsules (200 mg) by mouth once daily for 14 days. 14 capsule 0    fluticasone propionate (FLONASE) 50 mcg/actuation nasal spray SPRAY 2 SPRAYS INTO EACH NOSTRIL ONCE EVERY DAY 48 mL 0    GAVILYTE-G 236-22.74-6.74 -5.86 gram suspension Take 4,000 mLs by mouth once.      nitroGLYCERIN (NITROSTAT) 0.4 MG SL tablet Place 1 tablet (0.4 mg total) under the tongue every 5 (five) minutes as needed for Chest pain. 25 tablet 0    triamterene-hydrochlorothiazide 37.5-25 mg (MAXZIDE-25) 37.5-25 mg per tablet Take HALF a tablet by mouth once daily. 15 tablet 11     No current facility-administered medications for this visit.       Patient Care  Team:  Najma Rangel MD as PCP - General (Internal Medicine)  Malena Orlando LPN as Care Coordinator  Dr. Sj Fraga (Optometry)         - The patient is given an After Visit Summary that lists all medications with directions, allergies, education, orders placed during this encounter and follow-up instructions.      - I have reviewed the patient's medical information including past medical, family, and social history sections including the medications and allergies.      - We discussed the patient's current medications.     This note was created by combination of typed  and MModal dictation.  Transcription errors may be present.  If there are any questions, please contact me.       Roxi Rabago NP

## 2024-06-12 ENCOUNTER — HOSPITAL ENCOUNTER (OUTPATIENT)
Facility: HOSPITAL | Age: 69
Discharge: HOME | End: 2024-06-12
Attending: INTERNAL MEDICINE | Admitting: INTERNAL MEDICINE
Payer: COMMERCIAL

## 2024-06-12 ENCOUNTER — ANESTHESIA (OUTPATIENT)
Dept: ENDOSCOPY | Facility: HOSPITAL | Age: 69
End: 2024-06-12
Payer: COMMERCIAL

## 2024-06-12 VITALS
TEMPERATURE: 98 F | DIASTOLIC BLOOD PRESSURE: 80 MMHG | SYSTOLIC BLOOD PRESSURE: 163 MMHG | HEART RATE: 58 BPM | RESPIRATION RATE: 20 BRPM | OXYGEN SATURATION: 100 %

## 2024-06-12 DIAGNOSIS — Z12.12 ENCOUNTER FOR COLORECTAL CANCER SCREENING: ICD-10-CM

## 2024-06-12 DIAGNOSIS — Z12.11 ENCOUNTER FOR COLORECTAL CANCER SCREENING: ICD-10-CM

## 2024-06-12 PROCEDURE — 63600175 PHARM REV CODE 636 W HCPCS: Performed by: STUDENT IN AN ORGANIZED HEALTH CARE EDUCATION/TRAINING PROGRAM

## 2024-06-12 PROCEDURE — 27201089 HC SNARE, DISP (ANY): Performed by: INTERNAL MEDICINE

## 2024-06-12 PROCEDURE — 25000003 PHARM REV CODE 250: Performed by: STUDENT IN AN ORGANIZED HEALTH CARE EDUCATION/TRAINING PROGRAM

## 2024-06-12 PROCEDURE — D9220A PRA ANESTHESIA: Mod: CRNA,,, | Performed by: STUDENT IN AN ORGANIZED HEALTH CARE EDUCATION/TRAINING PROGRAM

## 2024-06-12 PROCEDURE — D9220A PRA ANESTHESIA: Mod: ANES,,, | Performed by: ANESTHESIOLOGY

## 2024-06-12 PROCEDURE — 45385 COLONOSCOPY W/LESION REMOVAL: CPT | Mod: 22,PT | Performed by: INTERNAL MEDICINE

## 2024-06-12 PROCEDURE — 25000003 PHARM REV CODE 250: Performed by: ANESTHESIOLOGY

## 2024-06-12 PROCEDURE — 45385 COLONOSCOPY W/LESION REMOVAL: CPT | Mod: 33,,, | Performed by: INTERNAL MEDICINE

## 2024-06-12 PROCEDURE — 37000009 HC ANESTHESIA EA ADD 15 MINS: Performed by: INTERNAL MEDICINE

## 2024-06-12 PROCEDURE — 88305 TISSUE EXAM BY PATHOLOGIST: CPT | Performed by: PATHOLOGY

## 2024-06-12 PROCEDURE — 37000008 HC ANESTHESIA 1ST 15 MINUTES: Performed by: INTERNAL MEDICINE

## 2024-06-12 RX ORDER — LIDOCAINE HYDROCHLORIDE 20 MG/ML
INJECTION INTRAVENOUS
Status: DISCONTINUED | OUTPATIENT
Start: 2024-06-12 | End: 2024-06-12

## 2024-06-12 RX ORDER — SODIUM CHLORIDE 9 MG/ML
INJECTION, SOLUTION INTRAVENOUS CONTINUOUS
Status: DISCONTINUED | OUTPATIENT
Start: 2024-06-12 | End: 2024-06-12 | Stop reason: HOSPADM

## 2024-06-12 RX ORDER — PROPOFOL 10 MG/ML
VIAL (ML) INTRAVENOUS
Status: COMPLETED
Start: 2024-06-12 | End: 2024-06-12

## 2024-06-12 RX ORDER — PROPOFOL 10 MG/ML
VIAL (ML) INTRAVENOUS CONTINUOUS PRN
Status: DISCONTINUED | OUTPATIENT
Start: 2024-06-12 | End: 2024-06-12

## 2024-06-12 RX ORDER — PROPOFOL 10 MG/ML
VIAL (ML) INTRAVENOUS
Status: DISCONTINUED
Start: 2024-06-12 | End: 2024-06-12 | Stop reason: HOSPADM

## 2024-06-12 RX ORDER — LIDOCAINE HYDROCHLORIDE 20 MG/ML
INJECTION, SOLUTION EPIDURAL; INFILTRATION; INTRACAUDAL; PERINEURAL
Status: DISCONTINUED
Start: 2024-06-12 | End: 2024-06-12 | Stop reason: HOSPADM

## 2024-06-12 RX ADMIN — PROPOFOL 30 MG: 10 INJECTION, EMULSION INTRAVENOUS at 07:06

## 2024-06-12 RX ADMIN — LIDOCAINE HYDROCHLORIDE 100 MG: 20 INJECTION, SOLUTION INTRAVENOUS at 07:06

## 2024-06-12 RX ADMIN — SODIUM CHLORIDE: 0.9 INJECTION, SOLUTION INTRAVENOUS at 07:06

## 2024-06-12 RX ADMIN — PROPOFOL 98.72 MCG/KG/MIN: 10 INJECTION, EMULSION INTRAVENOUS at 07:06

## 2024-06-12 NOTE — H&P
Short Stay Endoscopy History and Physical    PCP - Najma Rangel MD    Procedure - Colonoscopy  ASA - per anesthesia  Mallampati - per anesthesia  History of Anesthesia problems - no  Family history Anesthesia problems - no   Plan of anesthesia - General    HPI:  This is a 69 y.o. female here for evaluation of : personal history of colon polyps      ROS:  Constitutional: No fevers, chills, No weight loss  CV: No chest pain  Pulm: No cough, No shortness of breath  GI: see HPI  Derm: No rash    Medical History:  has a past medical history of Allergic rhinitis, Allergy, Arthritis, Joint pain, Keloid cicatrix, Obesity, Osteoarthritis, Personal history of colonic polyps (2010), Personal history of peptic ulcer disease, PONV (postoperative nausea and vomiting), Sleep apnea, and Umbilical hernia.    Surgical History:  has a past surgical history that includes  section, low transverse; Open laparoscopy for a ruptured ulcer; Cholecystectomy; Abdominal/umbilical hernia repair; Colonoscopy (N/A, 3/26/2019); and Total knee arthroplasty (Right, 8/15/2022).    Family History: family history includes Breast cancer in her mother; Cancer in her father; Glaucoma in her father; Heart disease in her brother and father; Hypertension in her father; Obesity in her sister; Sleep apnea in her brother.. Otherwise no colon cancer, inflammatory bowel disease, or GI malignancies.    Social History:  reports that she has never smoked. She has never used smokeless tobacco. She reports current alcohol use of about 2.0 - 3.0 standard drinks of alcohol per week. She reports that she does not use drugs.    Review of patient's allergies indicates:   Allergen Reactions    Amlodipine Other (See Comments)     Chest pressure    Antihistamines - alkylamine Shortness Of Breath    Losartan Shortness Of Breath    Atorvastatin      Other reaction(s): myalgia  Other reaction(s): myalgia    Ezetimibe      Other reaction(s): muscle  cramps  Other reaction(s): muscle cramps    Pravastatin Other (See Comments)      myalgia    Lactose intolerance (lactase) [lactase] Nausea Only       Medications:   Medications Prior to Admission   Medication Sig Dispense Refill Last Dose    aspirin (ECOTRIN) 81 MG EC tablet Take 1 tablet (81 mg total) by mouth once daily.       coenzyme Q10 200 mg capsule Take 1 capsules (200 mg) by mouth once daily for 14 days. 14 capsule 0     fluticasone propionate (FLONASE) 50 mcg/actuation nasal spray SPRAY 2 SPRAYS INTO EACH NOSTRIL ONCE EVERY DAY 48 mL 0     GAVILYTE-G 236-22.74-6.74 -5.86 gram suspension Take 4,000 mLs by mouth once.       nitroGLYCERIN (NITROSTAT) 0.4 MG SL tablet Place 1 tablet (0.4 mg total) under the tongue every 5 (five) minutes as needed for Chest pain. 25 tablet 0     triamterene-hydrochlorothiazide 37.5-25 mg (MAXZIDE-25) 37.5-25 mg per tablet Take HALF a tablet by mouth once daily. 15 tablet 11 6/10/2024         Physical Exam:    Vital Signs:   Vitals:    06/12/24 0717   BP: (!) 152/93   Pulse: 62   Resp: 18   Temp: 98 °F (36.7 °C)       Gen: NAD, lying comfortably  HENT: NCAT, oropharynx clear  Eyes: anicteric sclerae, EOMI grossly  Neck: supple, no visible masses/goiter  Cardiac: RRR  Lungs: non-labored breathing  Abd: soft, NT/ND, normoactive BS  Ext: no LE edema, warm, well perfused  Skin: skin intact on exposed body parts, no visible rashes, lesions  Neuro: A&Ox4, neuro exam grossly intact, moves all extremities  Psych: appropriate mood, affect        Labs:  Lab Results   Component Value Date    WBC 6.72 01/20/2024    HGB 11.5 (L) 01/20/2024    HCT 34.5 (L) 01/20/2024     01/20/2024    CHOL 237 (H) 01/03/2024    TRIG 140 01/03/2024    HDL 59 01/03/2024    ALT 18 01/19/2024    AST 16 01/19/2024     02/09/2024    K 3.7 02/09/2024     02/09/2024    CREATININE 1.0 02/09/2024    BUN 17 02/09/2024    CO2 27 02/09/2024    TSH 2.225 04/21/2015    HGBA1C 5.1 08/01/2022        Plan:  Colonoscopy for a history of colon polyps.    I have explained the risks and benefits of endoscopy procedures to the patient including but not limited to bleeding, perforation, infection, and death.  The patient was asked if they understand and allowed to ask any further questions to their satisfaction.    Stephanie Matta MD

## 2024-06-12 NOTE — TRANSFER OF CARE
Anesthesia Transfer of Care Note    Patient: Apoorva Fernandez    Procedure(s) Performed: Procedure(s) (LRB):  COLONOSCOPY (N/A)    Patient location: GI    Anesthesia Type: general    Transport from OR: Transported from OR on room air with adequate spontaneous ventilation    Post pain: adequate analgesia    Post assessment: no apparent anesthetic complications and tolerated procedure well    Post vital signs: stable    Level of consciousness: lethargic    Nausea/Vomiting: no nausea/vomiting    Complications: none    Transfer of care protocol was followed      Last vitals: Visit Vitals  /79 (BP Location: Left arm, Patient Position: Lying)   Pulse 66   Temp 36.7 °C (98.1 °F) (Oral)   Resp 18   LMP 08/22/2015 (Exact Date)   SpO2 95%   Breastfeeding No

## 2024-06-12 NOTE — ANESTHESIA PREPROCEDURE EVALUATION
06/12/2024  Apoorva Fernandez is a 69 y.o., female.      Pre-op Assessment    I have reviewed the Patient Summary Reports.     I have reviewed the Nursing Notes.       Review of Systems  Anesthesia Hx:  No problems with previous Anesthesia             Denies Family Hx of Anesthesia complications.   Personal Hx of Anesthesia complications, Post-Operative Nausea/Vomiting, in the past, but not with recent anesthetics / prophylaxis                    Social:  Non-Smoker       Cardiovascular:  Exercise tolerance: good   Hypertension     Denies Dysrhythmias.   Denies Angina.  Denies CHF.        01/2024 stress: +for ischemia; evaluated by cardiology; plans to perform angiography if pt develops chest pain again                         Pulmonary:        Sleep Apnea                Renal/:  Renal/ Normal                 Hepatic/GI:   PUD,               Musculoskeletal:  Arthritis   RA            Neurological:    Neuromuscular Disease,                                   Endocrine:  Endocrine Normal                Physical Exam  General: Well nourished, Cooperative, Alert and Oriented    Airway:  Mallampati: III   Mouth Opening: Small, but > 3cm  TM Distance: 4 - 6 cm  Tongue: Normal  Neck ROM: Normal ROM    Dental:  Intact    Chest/Lungs:  Normal Respiratory Rate, Clear to auscultation    Heart:  Rate: Normal  Rhythm: Regular Rhythm      Wt Readings from Last 3 Encounters:   06/11/24 101.3 kg (223 lb 5.2 oz)   05/07/24 101.6 kg (224 lb)   01/26/24 102.7 kg (226 lb 6.6 oz)     Temp Readings from Last 3 Encounters:   06/11/24 36.5 °C (97.7 °F) (Oral)   05/07/24 36.7 °C (98 °F) (Oral)   01/23/24 36.6 °C (97.9 °F)     BP Readings from Last 3 Encounters:   06/11/24 128/80   05/07/24 138/84   01/26/24 124/75     Pulse Readings from Last 3 Encounters:   06/11/24 68   05/07/24 64   01/26/24 67         Anesthesia Plan  Type  of Anesthesia, risks & benefits discussed:    Anesthesia Type: Gen Natural Airway, MAC, Gen ETT  Intra-op Monitoring Plan: Standard ASA Monitors  Post Op Pain Control Plan: multimodal analgesia  Induction:  IV  Informed Consent: Informed consent signed with the Patient and all parties understand the risks and agree with anesthesia plan.  All questions answered.   ASA Score: 3  Day of Surgery Review of History & Physical: H&P Update referred to the surgeon/provider.    Ready For Surgery From Anesthesia Perspective.     .

## 2024-06-12 NOTE — ANESTHESIA POSTPROCEDURE EVALUATION
Anesthesia Post Evaluation    Patient: Apoorva Fernandez    Procedure(s) Performed: Procedure(s) (LRB):  COLONOSCOPY (N/A)    Final Anesthesia Type: general      Patient location during evaluation: GI PACU  Patient participation: Yes- Able to Participate  Level of consciousness: awake and alert  Post-procedure vital signs: reviewed and stable  Pain management: adequate  Airway patency: patent    PONV status at discharge: No PONV  Anesthetic complications: no      Cardiovascular status: hemodynamically stable  Respiratory status: unassisted and spontaneous ventilation  Hydration status: euvolemic  Follow-up not needed.              Vitals Value Taken Time   /80 06/12/24 0833   Temp 36.7 °C (98.1 °F) 06/12/24 0803   Pulse 58 06/12/24 0833   Resp 20 06/12/24 0833   SpO2 100 % 06/12/24 0833         Event Time   Out of Recovery 08:40:20         Pain/Thomas Score: Thomas Score: 10 (6/12/2024  8:33 AM)

## 2024-06-12 NOTE — PROVATION PATIENT INSTRUCTIONS
Discharge Summary/Instructions after an Endoscopic Procedure  Patient Name: Apoorva Fernandez  Patient MRN: [id number]  Patient YOB: 1955 Wednesday, June 12, 2024  Stephanie Matta MD  Dear patient,  As a result of recent federal legislation (The Federal Cures Act), you may   receive lab or pathology results from your procedure in your [facility]   account before your physician is able to contact you. Your physician or   their representative will relay the results to you with their   recommendations at their soonest availability.  Thank you,  RESTRICTIONS:  During your procedure today, you received medications for sedation.  These   medications may affect your judgment, balance and coordination.  Therefore,   for 24 hours, you have the following restrictions:   - DO NOT drive a car, operate machinery, make legal/financial decisions,   sign important papers or drink alcohol.    ACTIVITY:  Today: no heavy lifting, straining or running due to procedural   sedation/anesthesia.  The following day: return to full activity including work.  DIET:  Eat and drink normally unless instructed otherwise.     TREATMENT FOR COMMON SIDE EFFECTS:  - Mild abdominal pain, nausea, belching, bloating or excessive gas:  rest,   eat lightly and use a heating pad.  - Sore Throat: treat with throat lozenges and/or gargle with warm salt   water.  - Because air was used during the procedure, expelling large amounts of air   from your rectum or belching is normal.  - If a bowel prep was taken, you may not have a bowel movement for 1-3 days.    This is normal.  SYMPTOMS TO WATCH FOR AND REPORT TO YOUR PHYSICIAN:  1. Abdominal pain or bloating, other than gas cramps.  2. Chest pain.  3. Back pain.  4. Signs of infection such as: chills or fever occurring within 24 hours   after the procedure.  5. Rectal bleeding, which would show as bright red, maroon, or black stools.   (A tablespoon of blood from the rectum is not serious, especially  if   hemorrhoids are present.)  6. Vomiting.  7. Weakness or dizziness.  GO DIRECTLY TO THE NEAREST EMERGENCY ROOM IF YOU HAVE ANY OF THE FOLLOWING:      Difficulty breathing              Chills and/or fever over 101 F   Persistent vomiting and/or vomiting blood   Severe abdominal pain   Severe chest pain   Black, tarry stools   Bleeding- more than one tablespoon   Any other symptom or condition that you feel may need urgent attention  Your doctor recommends these additional instructions:  If any biopsies were taken, your doctors clinic will contact you in 1 to 2   weeks with any results.  - Discharge patient to home.   - Resume previous diet.   - Continue present medications.   - Await pathology results.   - Repeat colonoscopy in 5 years for surveillance.  For questions, problems or results please call your physician - Stephanie Matta MD at Work:  [phone].  [facility] can be reached at [phone]     IF A COMPLICATION OR EMERGENCY SITUATION ARISES AND YOU ARE UNABLE TO REACH   YOUR PHYSICIAN - GO DIRECTLY TO THE EMERGENCY ROOM.  Stephanie Matta MD  6/12/2024 8:05:48 AM  This report has been verified and signed electronically.  Dear patient,  As a result of recent federal legislation (The Federal Cures Act), you may   receive lab or pathology results from your procedure in your [facility]   account before your physician is able to contact you. Your physician or   their representative will relay the results to you with their   recommendations at their soonest availability.  Thank you,  PROVATION

## 2024-06-14 LAB
FINAL PATHOLOGIC DIAGNOSIS: NORMAL
GROSS: NORMAL
Lab: NORMAL

## 2024-07-08 ENCOUNTER — PATIENT MESSAGE (OUTPATIENT)
Dept: CARDIOLOGY | Facility: CLINIC | Age: 69
End: 2024-07-08
Payer: COMMERCIAL

## 2024-07-09 DIAGNOSIS — E78.2 MIXED HYPERLIPIDEMIA: Primary | ICD-10-CM

## 2024-07-09 DIAGNOSIS — T46.6X5A STATIN MYOPATHY: ICD-10-CM

## 2024-07-09 DIAGNOSIS — G72.0 STATIN MYOPATHY: ICD-10-CM

## 2024-07-12 ENCOUNTER — LAB VISIT (OUTPATIENT)
Dept: LAB | Facility: HOSPITAL | Age: 69
End: 2024-07-12
Attending: INTERNAL MEDICINE
Payer: COMMERCIAL

## 2024-07-12 DIAGNOSIS — E78.2 MIXED HYPERLIPIDEMIA: ICD-10-CM

## 2024-07-12 DIAGNOSIS — G72.0 STATIN MYOPATHY: ICD-10-CM

## 2024-07-12 DIAGNOSIS — T46.6X5A STATIN MYOPATHY: ICD-10-CM

## 2024-07-12 PROCEDURE — 82172 ASSAY OF APOLIPOPROTEIN: CPT | Performed by: INTERNAL MEDICINE

## 2024-07-12 PROCEDURE — 36415 COLL VENOUS BLD VENIPUNCTURE: CPT | Mod: PO | Performed by: INTERNAL MEDICINE

## 2024-07-12 PROCEDURE — 83695 ASSAY OF LIPOPROTEIN(A): CPT | Performed by: INTERNAL MEDICINE

## 2024-07-12 PROCEDURE — 80053 COMPREHEN METABOLIC PANEL: CPT | Performed by: INTERNAL MEDICINE

## 2024-07-12 PROCEDURE — 80061 LIPID PANEL: CPT | Performed by: INTERNAL MEDICINE

## 2024-07-12 PROCEDURE — 82550 ASSAY OF CK (CPK): CPT | Performed by: INTERNAL MEDICINE

## 2024-07-13 LAB
ALBUMIN SERPL BCP-MCNC: 3.6 G/DL (ref 3.5–5.2)
ALP SERPL-CCNC: 87 U/L (ref 55–135)
ALT SERPL W/O P-5'-P-CCNC: 26 U/L (ref 10–44)
ANION GAP SERPL CALC-SCNC: 10 MMOL/L (ref 8–16)
AST SERPL-CCNC: 23 U/L (ref 10–40)
BILIRUB SERPL-MCNC: 1 MG/DL (ref 0.1–1)
BUN SERPL-MCNC: 21 MG/DL (ref 8–23)
CALCIUM SERPL-MCNC: 8.9 MG/DL (ref 8.7–10.5)
CHLORIDE SERPL-SCNC: 104 MMOL/L (ref 95–110)
CHOLEST SERPL-MCNC: 210 MG/DL (ref 120–199)
CHOLEST/HDLC SERPL: 3.8 {RATIO} (ref 2–5)
CK SERPL-CCNC: 91 U/L (ref 20–180)
CO2 SERPL-SCNC: 22 MMOL/L (ref 23–29)
CREAT SERPL-MCNC: 0.9 MG/DL (ref 0.5–1.4)
EST. GFR  (NO RACE VARIABLE): >60 ML/MIN/1.73 M^2
GLUCOSE SERPL-MCNC: 88 MG/DL (ref 70–110)
HDLC SERPL-MCNC: 56 MG/DL (ref 40–75)
HDLC SERPL: 26.7 % (ref 20–50)
LDLC SERPL CALC-MCNC: 126 MG/DL (ref 63–159)
NONHDLC SERPL-MCNC: 154 MG/DL
POTASSIUM SERPL-SCNC: 3.6 MMOL/L (ref 3.5–5.1)
PROT SERPL-MCNC: 7.3 G/DL (ref 6–8.4)
SODIUM SERPL-SCNC: 136 MMOL/L (ref 136–145)
TRIGL SERPL-MCNC: 140 MG/DL (ref 30–150)

## 2024-07-15 LAB
APO B SERPL-MCNC: 101 MG/DL
LPA SERPL-MCNC: 18 MG/DL (ref 0–30)

## 2024-07-17 ENCOUNTER — OFFICE VISIT (OUTPATIENT)
Dept: CARDIOLOGY | Facility: CLINIC | Age: 69
End: 2024-07-17
Payer: COMMERCIAL

## 2024-07-17 ENCOUNTER — HOSPITAL ENCOUNTER (OUTPATIENT)
Dept: RADIOLOGY | Facility: HOSPITAL | Age: 69
Discharge: HOME | End: 2024-07-17
Attending: INTERNAL MEDICINE
Payer: COMMERCIAL

## 2024-07-17 VITALS
BODY MASS INDEX: 44.39 KG/M2 | WEIGHT: 227.31 LBS | DIASTOLIC BLOOD PRESSURE: 70 MMHG | SYSTOLIC BLOOD PRESSURE: 125 MMHG | HEART RATE: 67 BPM

## 2024-07-17 DIAGNOSIS — Z13.6 ENCOUNTER FOR SCREENING FOR CARDIOVASCULAR DISORDERS: ICD-10-CM

## 2024-07-17 DIAGNOSIS — I10 ESSENTIAL HYPERTENSION: ICD-10-CM

## 2024-07-17 DIAGNOSIS — Z13.6 ENCOUNTER FOR SCREENING FOR CARDIOVASCULAR DISORDERS: Primary | ICD-10-CM

## 2024-07-17 DIAGNOSIS — E66.01 MORBID OBESITY WITH BMI OF 45.0-49.9, ADULT: ICD-10-CM

## 2024-07-17 DIAGNOSIS — E78.2 MIXED HYPERLIPIDEMIA: ICD-10-CM

## 2024-07-17 PROBLEM — R07.9 CHEST PAIN: Status: RESOLVED | Noted: 2024-01-20 | Resolved: 2024-07-17

## 2024-07-17 PROCEDURE — 99214 OFFICE O/P EST MOD 30 MIN: CPT | Mod: S$GLB,,, | Performed by: INTERNAL MEDICINE

## 2024-07-17 PROCEDURE — 1101F PT FALLS ASSESS-DOCD LE1/YR: CPT | Mod: CPTII,S$GLB,, | Performed by: INTERNAL MEDICINE

## 2024-07-17 PROCEDURE — 3074F SYST BP LT 130 MM HG: CPT | Mod: CPTII,S$GLB,, | Performed by: INTERNAL MEDICINE

## 2024-07-17 PROCEDURE — 99999 PR PBB SHADOW E&M-EST. PATIENT-LVL III: CPT | Mod: PBBFAC,,, | Performed by: INTERNAL MEDICINE

## 2024-07-17 PROCEDURE — 3078F DIAST BP <80 MM HG: CPT | Mod: CPTII,S$GLB,, | Performed by: INTERNAL MEDICINE

## 2024-07-17 PROCEDURE — 3288F FALL RISK ASSESSMENT DOCD: CPT | Mod: CPTII,S$GLB,, | Performed by: INTERNAL MEDICINE

## 2024-07-17 PROCEDURE — 75571 CT HRT W/O DYE W/CA TEST: CPT | Mod: 26,,, | Performed by: STUDENT IN AN ORGANIZED HEALTH CARE EDUCATION/TRAINING PROGRAM

## 2024-07-17 PROCEDURE — 75571 CT HRT W/O DYE W/CA TEST: CPT | Mod: TC

## 2024-07-17 PROCEDURE — 1159F MED LIST DOCD IN RCRD: CPT | Mod: CPTII,S$GLB,, | Performed by: INTERNAL MEDICINE

## 2024-07-17 PROCEDURE — 3008F BODY MASS INDEX DOCD: CPT | Mod: CPTII,S$GLB,, | Performed by: INTERNAL MEDICINE

## 2024-07-17 PROCEDURE — 1126F AMNT PAIN NOTED NONE PRSNT: CPT | Mod: CPTII,S$GLB,, | Performed by: INTERNAL MEDICINE

## 2024-07-17 PROCEDURE — 4010F ACE/ARB THERAPY RXD/TAKEN: CPT | Mod: CPTII,S$GLB,, | Performed by: INTERNAL MEDICINE

## 2024-07-17 RX ORDER — MULTIVITAMIN
1 TABLET ORAL DAILY
COMMUNITY

## 2024-07-17 RX ORDER — AMOXICILLIN 500 MG
CAPSULE ORAL DAILY
COMMUNITY

## 2024-07-17 NOTE — PROGRESS NOTES
Subjective:   07/17/2024     Patient ID:  Apoorva Fernandez is a 69 y.o. female who presents for evaulation of Follow-up      Patient doing well with no chest pains or tightness, no PND or orthopnea.  Blood pressure at home is well controlled on low-dose diuretic therapy.    She does have hypercholesterolemia.  She has been intolerant of statin therapy and ezetimibe.  This was tried after she was loaded with Q10.  No help.  Still intolerant.  The 10-year ASCVD risk score (Chuckie FARNSWORTH, et al., 2019) is: 12.2%    Values used to calculate the score:      Age: 69 years      Sex: Female      Is Non- : No      Diabetic: No      Tobacco smoker: No      Systolic Blood Pressure: 125 mmHg      Is BP treated: Yes      HDL Cholesterol: 56 mg/dL      Total Cholesterol: 210 mg/dL    Review of all CT does not show the presence of coronary calcification.    Prior note reviewed:  Patient here for follow-up after hospitalization for chest pain and hypertension.  Her blood pressure appears better controlled on low-dose diuretic therapy.  Her chest pain has also improved.  She does have chest tightness, this appears to be mild and improved since hospitalization.  In the hospitalization last week, stress echocardiography was normal, she did have a nuclear stress test for follow-up, that showed a small reperfusing inferior wall defect, specificity reduced due to patient's body habitus.  A PET stress test was not performed.  Coronary angiography was not performed.    She does have hypercholesterolemia, history of statin induced myalgia on atorvastatin.  Now, she is being loaded with Q10 prior to initiation of pravastatin.    Blood pressure appears to be well controlled on current diuretic therapy.    Hospital note reviewed:  Apoorva Fernandez is a 69 y.o. female with PMHx of HTN, HLD, obesity, She presents to the ED with chest pain. For about two weeks now she has had substernal chest pressure with associated  dyspnea on exertion. She has had to stop when walking very short distances to catch her breath. The chest pain has radiated to her arms and the left jaw/neck. Chest pain/GORDILLO today not relieved with rest and severe. Felt like there was an elephant siting on her chest. After getting 3 nitroglycerin and aspirin in ED the pain improved and felt more like a baby sitting on her chest. Some associated lightheadedness. Has leg swelling but this is more chronic, especially after stopped her fluid pills.  Denies fever, chills, cough, nausea, vomiting. Her BP has not been very well controlled lately and she has been changing medications with her PCP. States she was taking triamterene HCTZ but stopped recently due to her kidney function. Started losartan but then stopped around 1/10 because of her chest pain that she though may be associated with losartan. Started amlodipine 5 mg 1/18 then stopped due to chest pain. SBP 1/18 recorded 179/67 and 165/72.      In the ED, Initial /85, improved s/p nitroglycerin. Otherwise afebrile and vitals stable. Troponin x2 wnl. BNP 30. D-dimer slightly elevated to 0.75. CBC and CMP unremarkable. EKG NSR, Left axis deviation, RBBB, rate 65. CXR with coarse interstitial attenuation and basilar subsegmental atelectasis bilaterally.  CTA without definite evidence of PE but examination degraded by respiratory motion artifact. She was given aspirin 325 mg, nitroglycerin x3. Admitted to  for chest pain.         Hospital Course:   Pt placed in obserrvation for substernal chest pressure and GORDILLO concerning for ACS. Pt HDS on RA. Troponin negative x2, BNP wnl. D dimer elevated. ECG w/ NSR, RBBB, no gross acute ischemia. CXR and CTA chest unremarkable. DVT US negative. Pt's pain greatly reduced w/ sublingual nitro and asa. Stress echo showed EF 65%, normal diastolic function, normal systolic function, ecg portion negative, no evidence of stress induced ischemia. Test was stopped d/t VT/VF and AV  block. Cardiology consulted and recommending NM stress test. Results showed  a mild intensity, small sized, reversible perfusion abnormality that is consistent with ischemia in the mid inferolateral wall(s). abnormal myocardial perfusion scan. Cardiology recommended no LHC at this time, medical management w/ sl nitro, statin, BP control. Cardiology f/u in 1 week. Pt med rec'd and medications were delivered to bedside prior to discharge. Pt medically ready for discharge home. Pt educated on hospital course and plan, verbally agrees and understands. All questions answered.        Goals of Care Treatment Preferences:  Code Status: Full Code        Consults:   Consults (From admission, onward)           Status Ordering Provider        Inpatient consult to Cardiology  Once       Provider:  (Not yet assigned)   Completed DARREN REYNAGA        [facility]  Once       Provider:  (Not yet assigned)   Completed FRANKI TAPIA              Cardiac/Vascular  * Chest pain  69F with history of HTN, HLD, obesity presenting with new onset of substernal chest pain and GORDILLO x2 two weeks. Radiates to neck /jaw, arms. Chest pain improved with nitroglycerin. HEART score 6.  - Initially hypertensive but improved and vitals otherwise stable  - Troponin x2 normal. BNP wnl   - EKG without acute ischemic change  - D dimer slightly elevated. CTA negative for PE but limited study.   - S/p aspirin 325 and nitro x3 in ED  - Monitor telemetry   - Stress echo reviewed  - NM stress test positive  - DVT US negative  - Aspirin 81 mg daily  - PRN nitroglycerin         Final Active Diagnoses:    Diagnosis Date Noted POA  · PRINCIPAL PROBLEM:  Chest pain [R07.9] 01/20/2024 Yes  · BELLA (obstructive sleep apnea) [G47.33]   Yes  · Essential hypertension [I10] 04/21/2015 Yes  · Morbid obesity with BMI of 45.0-49.9, adult [E66.01, Z68.42]   Not Applicable  · Other hyperlipidemia [E78.49]   Yes     Problems Resolved During this  Admission:        Discharged Condition: good     Disposition: Home or Self Care     Follow Up:      Follow-up Information            [facility] - Cardiology - 3rd Fl Follow up in 1 week(s).   Specialty: Cardiology  Why: Hospital DC follow-up for chest pain; mildly positive SPECT and medically managed  Contact information:  [address]  [facility] [address] [phone]  Additional information:  Cardiology Services Clinics - 3rd floor                      Past Medical History:   Diagnosis Date    Allergic rhinitis     Allergy     Arthritis     Joint pain     Keloid cicatrix     Obesity     Osteoarthritis     Personal history of colonic polyps 08/2010    Adenomatous    Personal history of peptic ulcer disease     PONV (postoperative nausea and vomiting)     Sleep apnea     Umbilical hernia        Review of patient's allergies indicates:   Allergen Reactions    Amlodipine Other (See Comments)     Chest pressure    Antihistamines - alkylamine Shortness Of Breath    Losartan Shortness Of Breath    Atorvastatin      Other reaction(s): myalgia  Other reaction(s): myalgia    Ezetimibe      Other reaction(s): muscle cramps  Other reaction(s): muscle cramps    Pravastatin Other (See Comments)      myalgia    Lactose intolerance (lactase) [lactase] Nausea Only         Current Outpatient Medications:     aspirin (ECOTRIN) 81 MG EC tablet, Take 1 tablet (81 mg total) by mouth once daily., Disp: , Rfl:     coenzyme Q10 200 mg capsule, Take 1 capsules (200 mg) by mouth once daily for 14 days., Disp: 14 capsule, Rfl: 0    fluticasone propionate (FLONASE) 50 mcg/actuation nasal spray, SPRAY 2 SPRAYS INTO EACH NOSTRIL ONCE EVERY DAY, Disp: 48 mL, Rfl: 0    multivitamin (ONE DAILY MULTIVITAMIN) per tablet, Take 1 tablet by mouth once daily., Disp: , Rfl:     nitroGLYCERIN (NITROSTAT) 0.4 MG SL tablet, Place 1 tablet (0.4 mg total) under the tongue every 5 (five) minutes as needed for Chest pain., Disp: 25 tablet,  Rfl: 0    omega-3 fatty acids/fish oil (FISH OIL-OMEGA-3 FATTY ACIDS) 300-1,000 mg capsule, Take by mouth once daily., Disp: , Rfl:     triamterene-hydrochlorothiazide 37.5-25 mg (MAXZIDE-25) 37.5-25 mg per tablet, Take HALF a tablet by mouth once daily., Disp: 15 tablet, Rfl: 11    GAVILYTE-G 236-22.74-6.74 -5.86 gram suspension, Take 4,000 mLs by mouth once. (Patient not taking: Reported on 7/17/2024), Disp: , Rfl:      Objective:   Review of Systems   Cardiovascular:  Positive for chest pain and dyspnea on exertion. Negative for claudication, cyanosis, irregular heartbeat, leg swelling, near-syncope, orthopnea, palpitations, paroxysmal nocturnal dyspnea and syncope.         Vitals:    07/17/24 1401   BP: 125/70   Pulse: 67       Wt Readings from Last 3 Encounters:   07/17/24 103.1 kg (227 lb 4.7 oz)   06/11/24 101.3 kg (223 lb 5.2 oz)   05/07/24 101.6 kg (224 lb)     Temp Readings from Last 3 Encounters:   06/12/24 98.1 °F (36.7 °C) (Oral)   06/11/24 97.7 °F (36.5 °C) (Oral)   05/07/24 98 °F (36.7 °C) (Oral)     BP Readings from Last 3 Encounters:   07/17/24 125/70   06/12/24 (!) 163/80   06/11/24 128/80     Pulse Readings from Last 3 Encounters:   07/17/24 67   06/12/24 (!) 58   06/11/24 68             Physical Exam  Vitals reviewed.   Constitutional:       General: She is not in acute distress.     Appearance: She is well-developed.   HENT:      Head: Normocephalic and atraumatic.      Nose: Nose normal.   Eyes:      Conjunctiva/sclera: Conjunctivae normal.      Pupils: Pupils are equal, round, and reactive to light.   Neck:      Vascular: No carotid bruit or JVD.   Cardiovascular:      Rate and Rhythm: Normal rate and regular rhythm.      Pulses: Normal pulses and intact distal pulses.      Heart sounds: Normal heart sounds. No murmur heard.     No friction rub. No gallop.   Pulmonary:      Effort: Pulmonary effort is normal. No respiratory distress.      Breath sounds: Normal breath sounds. No wheezing or  rales.   Chest:      Chest wall: No tenderness.   Abdominal:      General: Bowel sounds are normal. There is no distension.      Palpations: Abdomen is soft.      Tenderness: There is no abdominal tenderness.   Musculoskeletal:         General: No tenderness or deformity. Normal range of motion.      Cervical back: Normal range of motion and neck supple.      Right lower leg: No edema.      Left lower leg: No edema.   Skin:     General: Skin is warm and dry.      Findings: No erythema or rash.   Neurological:      Mental Status: She is alert and oriented to person, place, and time.      Cranial Nerves: No cranial nerve deficit.      Motor: No abnormal muscle tone.      Coordination: Coordination normal.   Psychiatric:         Behavior: Behavior normal.         Thought Content: Thought content normal.         Judgment: Judgment normal.           Lab Results   Component Value Date    CHOL 210 (H) 07/12/2024    CHOL 237 (H) 01/03/2024    CHOL 206 (H) 11/03/2022     Lab Results   Component Value Date    HDL 56 07/12/2024    HDL 59 01/03/2024    HDL 57 11/03/2022     Lab Results   Component Value Date    LDLCALC 126.0 07/12/2024    LDLCALC 150.0 01/03/2024    LDLCALC 124.6 11/03/2022     Lab Results   Component Value Date    ALT 26 07/12/2024    AST 23 07/12/2024    AST 16 01/19/2024    AST 15 01/03/2024     Lab Results   Component Value Date    CREATININE 0.9 07/12/2024    BUN 21 07/12/2024     07/12/2024    K 3.6 07/12/2024    CO2 22 (L) 07/12/2024    CO2 27 02/09/2024    CO2 21 (L) 01/22/2024     Lab Results   Component Value Date    HGB 11.5 (L) 01/20/2024    HCT 34.5 (L) 01/20/2024    HCT 38.3 01/19/2024    HCT 39.3 01/03/2024                         Assessment and Plan:     Encounter for screening for cardiovascular disorders  -     CT Cardiac Scoring; Future; Expected date: 07/17/2024    Mixed hyperlipidemia  Comments:  LDL still elevated   Statin intolerant    Essential hypertension  Comments:  Blood  pressure well controlled    Morbid obesity with BMI of 45.0-49.9, adult  Comments:  Think she would benefit from G LP 1 agonist therapy       Will further assess cardiac risk with a CT calcium score    No follow-ups on file.          Future Appointments   Date Time Provider Department Center   1/15/2025  8:20 AM Najma Rangel MD [facility]

## 2024-07-18 ENCOUNTER — PATIENT MESSAGE (OUTPATIENT)
Dept: CARDIOLOGY | Facility: CLINIC | Age: 69
End: 2024-07-18
Payer: COMMERCIAL

## 2024-07-22 ENCOUNTER — TELEPHONE (OUTPATIENT)
Dept: CARDIOLOGY | Facility: CLINIC | Age: 69
End: 2024-07-22
Payer: COMMERCIAL

## 2024-08-12 ENCOUNTER — PATIENT MESSAGE (OUTPATIENT)
Dept: FAMILY MEDICINE | Facility: CLINIC | Age: 69
End: 2024-08-12
Payer: COMMERCIAL

## 2024-08-12 DIAGNOSIS — N95.0 POSTMENOPAUSAL VAGINAL BLEEDING: Primary | ICD-10-CM

## 2024-09-20 ENCOUNTER — OFFICE VISIT (OUTPATIENT)
Dept: OBSTETRICS AND GYNECOLOGY | Facility: CLINIC | Age: 69
End: 2024-09-20
Payer: COMMERCIAL

## 2024-09-20 VITALS
SYSTOLIC BLOOD PRESSURE: 150 MMHG | WEIGHT: 228.81 LBS | BODY MASS INDEX: 44.69 KG/M2 | DIASTOLIC BLOOD PRESSURE: 90 MMHG

## 2024-09-20 DIAGNOSIS — N95.0 POSTMENOPAUSAL VAGINAL BLEEDING: Primary | ICD-10-CM

## 2024-09-20 PROCEDURE — 99999 PR PBB SHADOW E&M-EST. PATIENT-LVL III: CPT | Mod: PBBFAC,,, | Performed by: OBSTETRICS & GYNECOLOGY

## 2024-09-20 NOTE — PROGRESS NOTES
Subjective     Patient ID: Apoorva Fernandez is a 69 y.o. female.    Chief Complaint:  Vaginal Bleeding      History of Present Illness  HPI  Patient had one episode of postmenopausal bleeding.  Otherwise, no complaints.  No bleeding for last 5 years.    GYN & OB History  Patient's last menstrual period was 2015 (exact date).   Date of Last Pap: 2018    OB History    Para Term  AB Living   2 2 2     2   SAB IAB Ectopic Multiple Live Births                  # Outcome Date GA Lbr Wally/2nd Weight Sex Type Anes PTL Lv   2 Term            1 Term              Past Medical History:  Past Medical History:   Diagnosis Date    Allergic rhinitis     Allergy     Arthritis     Joint pain     Keloid cicatrix     Obesity     Osteoarthritis     Personal history of colonic polyps 2010    Adenomatous    Personal history of peptic ulcer disease     PONV (postoperative nausea and vomiting)     Sleep apnea     Umbilical hernia        Past Surgical History:  Past Surgical History:   Procedure Laterality Date    Abdominal/umbilical hernia repair      removed small part of small bowel; mesh placed     SECTION, LOW TRANSVERSE      X2    CHOLECYSTECTOMY      COLONOSCOPY N/A 3/26/2019    Procedure: COLONOSCOPY;  Surgeon: Chandrika Ho MD;  Location: [facility] ENDO;  Service: Endoscopy;  Laterality: N/A;    COLONOSCOPY N/A 2024    Procedure: COLONOSCOPY;  Surgeon: Stephanie Matta MD;  Location: [facility] ENDO;  Service: Endoscopy;  Laterality: N/A;  Ref by DEE DEE Martin, portal - PC  24- lvm and portal msg for pc. DBM  24- lvm for pc. DBM    Open laparoscopy for a ruptured ulcer      TOTAL KNEE ARTHROPLASTY Right 8/15/2022    Procedure: ARTHROPLASTY, KNEE, TOTAL- JOAN;  Surgeon: Jass Willis MD;  Location: [facility] OR;  Service: Orthopedics;  Laterality: Right;  NAE SALGADO [phone] TEXTED HIM @ 7:06AM  ON 27:06AM ON 2022  1ST BALWINDER DAVIS [phone] TEXTED HER  @ 7:16AM ON 2022 REPLIED @ 7:18AM ON 2022  NOON START  SUPINE  RN PREOP 2022    T/S ON 2022       HAS MEDICAL TESHA       Family History:  Family History   Problem Relation Name Age of Onset    Breast cancer Mother Jackie Camacho          at age 65    Cancer Father          Bladder    Glaucoma Father      Hypertension Father      Heart disease Father      Obesity Sister Suyapa     Heart disease Brother Pankaj     Sleep apnea Brother Reji     Diabetes Neg Hx      Melanoma Neg Hx         Allergies:  Review of patient's allergies indicates:   Allergen Reactions    Amlodipine Other (See Comments)     Chest pressure    Antihistamines - alkylamine Shortness Of Breath    Losartan Shortness Of Breath    Atorvastatin      Other reaction(s): myalgia  Other reaction(s): myalgia    Ezetimibe      Other reaction(s): muscle cramps  Other reaction(s): muscle cramps    Pravastatin Other (See Comments)      myalgia    Lactose intolerance (lactase) [lactase] Nausea Only       Medications:  Current Outpatient Medications on File Prior to Visit   Medication Sig Dispense Refill    aspirin (ECOTRIN) 81 MG EC tablet Take 1 tablet (81 mg total) by mouth once daily.      fluticasone propionate (FLONASE) 50 mcg/actuation nasal spray SPRAY 2 SPRAYS INTO EACH NOSTRIL ONCE EVERY DAY 48 mL 0    multivitamin (ONE DAILY MULTIVITAMIN) per tablet Take 1 tablet by mouth once daily.      omega-3 fatty acids/fish oil (FISH OIL-OMEGA-3 FATTY ACIDS) 300-1,000 mg capsule Take by mouth once daily.      triamterene-hydrochlorothiazide 37.5-25 mg (MAXZIDE-25) 37.5-25 mg per tablet Take HALF a tablet by mouth once daily. 15 tablet 11    coenzyme Q10 200 mg capsule Take 1 capsules (200 mg) by mouth once daily for 14 days. 14 capsule 0    GAVILYTE-G 236-22.74-6.74 -5.86 gram suspension Take 4,000 mLs by mouth once. (Patient not taking: Reported on 2024)      nitroGLYCERIN (NITROSTAT) 0.4 MG SL tablet Place 1 tablet (0.4 mg total)  under the tongue every 5 (five) minutes as needed for Chest pain. (Patient not taking: Reported on 9/20/2024) 25 tablet 0     No current facility-administered medications on file prior to visit.       Social History:  Social History     Tobacco Use    Smoking status: Never     Passive exposure: Never    Smokeless tobacco: Never   Substance Use Topics    Alcohol use: Yes     Alcohol/week: 2.0 - 3.0 standard drinks of alcohol     Types: 2 - 3 Glasses of wine per week     Comment: Socially on the weekends    Drug use: No            Review of Systems  Review of Systems   Constitutional: Negative.    HENT: Negative.     Eyes: Negative.    Respiratory: Negative.     Cardiovascular: Negative.    Gastrointestinal: Negative.    Endocrine: Negative.    Genitourinary: Negative.  Positive for postmenopausal bleeding.   Musculoskeletal: Negative.    Integumentary:  Negative.   Neurological: Negative.    Hematological: Negative.    Psychiatric/Behavioral: Negative.     Breast: negative.           Objective   Physical Exam:   Constitutional: She is oriented to person, place, and time. She appears well-nourished.    HENT:   Head: Normocephalic and atraumatic.    Eyes: EOM are normal. Right eye exhibits normal extraocular motion. Left eye exhibits normal extraocular motion.    Neck: No thyromegaly present.    Cardiovascular:  Normal rate.             Pulmonary/Chest: Effort normal. No respiratory distress. Right breast exhibits no mass, no skin change and no tenderness. Left breast exhibits no mass, no skin change and no tenderness. Breasts are symmetrical.        Abdominal: Soft. She exhibits no distension and no mass. There is no abdominal tenderness.     Genitourinary:    Vagina, uterus, right adnexa and left adnexa normal.      Pelvic exam was performed with patient supine.   The external female genitalia was normal.   No external genitalia lesions identified,Genitalia hair distrobution normal .     Labial bartholins  normal.There is no rash or lesion on the right labia. There is no rash or lesion on the left labia. Cervix is normal. Right adnexum displays no tenderness and no fullness. Left adnexum displays no tenderness and no fullness. No vaginal discharge or bleeding in the vagina.    No signs of injury in the vagina.   Vagina was moist.Cervix exhibits no motion tenderness and no friability. Uterus consistancy normal and Uerus contour normal  Uterus is not tender. Normal urethral meatus.Urethral Meatus exhibits: no urethral lesionUrethra findings: no urethral mass, no tenderness and no prolapsedBladder findings: no bladder distention and no bladder tenderness          Musculoskeletal: Normal range of motion.      Lymphadenopathy:     She has no cervical adenopathy.    Neurological: She is oriented to person, place, and time.   Cranial Nerves II-XII grossly intact.    Skin: No rash noted. No erythema.    Psychiatric: She has a normal mood and affect. Her behavior is normal.            Assessment and Plan     1. Postmenopausal vaginal bleeding             Plan:  1. Postmenopausal vaginal bleeding  - Pelvic ultrasound ordered.  Will call patient with results.  - Patient instructed to call office if she has not heard anything 2 wks after ultrasound.  - US Pelvis Comp with Transvag NON-OB (xpd; Future

## 2024-09-30 ENCOUNTER — HOSPITAL ENCOUNTER (OUTPATIENT)
Dept: RADIOLOGY | Facility: HOSPITAL | Age: 69
Discharge: HOME | End: 2024-09-30
Attending: OBSTETRICS & GYNECOLOGY
Payer: COMMERCIAL

## 2024-09-30 DIAGNOSIS — N95.0 POSTMENOPAUSAL VAGINAL BLEEDING: ICD-10-CM

## 2024-09-30 PROCEDURE — 76856 US EXAM PELVIC COMPLETE: CPT | Mod: 26,,, | Performed by: RADIOLOGY

## 2024-09-30 PROCEDURE — 76830 TRANSVAGINAL US NON-OB: CPT | Mod: 26,,, | Performed by: RADIOLOGY

## 2024-09-30 PROCEDURE — 76830 TRANSVAGINAL US NON-OB: CPT | Mod: TC

## 2024-09-30 PROCEDURE — 76856 US EXAM PELVIC COMPLETE: CPT | Mod: TC

## 2024-10-09 ENCOUNTER — TELEPHONE (OUTPATIENT)
Dept: OBSTETRICS AND GYNECOLOGY | Facility: CLINIC | Age: 69
End: 2024-10-09
Payer: COMMERCIAL

## 2024-10-09 NOTE — TELEPHONE ENCOUNTER
Pt states that she has seen her US results, but she has some questions and asks that the dr gives her a call

## 2024-10-22 ENCOUNTER — TELEPHONE (OUTPATIENT)
Dept: OBSTETRICS AND GYNECOLOGY | Facility: CLINIC | Age: 69
End: 2024-10-22
Payer: COMMERCIAL

## 2024-10-22 NOTE — TELEPHONE ENCOUNTER
----- Message from [employer]unchyroll sent at 10/22/2024  1:44 PM CDT -----  Regarding: self [phone]  Type:  Patient Returning Call    Who Called:  self     Who Left Message for Patient:  NA     Does the patient know what this is regarding?: No, asked for a call back tomorrow due to her going to the dentist.       Would the patient rather a call back or a response via [facility]?  Call back     Best Call Back Number: [phone]   Chart(s)/Patient

## 2024-10-29 ENCOUNTER — PATIENT MESSAGE (OUTPATIENT)
Dept: OBSTETRICS AND GYNECOLOGY | Facility: CLINIC | Age: 69
End: 2024-10-29
Payer: COMMERCIAL

## 2024-10-29 ENCOUNTER — PATIENT MESSAGE (OUTPATIENT)
Dept: FAMILY MEDICINE | Facility: CLINIC | Age: 69
End: 2024-10-29
Payer: COMMERCIAL

## 2024-10-29 DIAGNOSIS — J30.9 ALLERGIC RHINITIS, UNSPECIFIED SEASONALITY, UNSPECIFIED TRIGGER: ICD-10-CM

## 2024-10-29 DIAGNOSIS — I10 ESSENTIAL HYPERTENSION: Primary | ICD-10-CM

## 2024-10-30 RX ORDER — TRIAMTERENE/HYDROCHLOROTHIAZID 37.5-25 MG
0.5 TABLET ORAL DAILY
Qty: 45 TABLET | Refills: 0 | Status: SHIPPED | OUTPATIENT
Start: 2024-10-30

## 2024-10-30 RX ORDER — FLUTICASONE PROPIONATE 50 MCG
SPRAY, SUSPENSION (ML) NASAL
Qty: 48 ML | Refills: 0 | Status: SHIPPED | OUTPATIENT
Start: 2024-10-30

## 2024-11-07 ENCOUNTER — CLINICAL SUPPORT (OUTPATIENT)
Dept: FAMILY MEDICINE | Facility: CLINIC | Age: 69
End: 2024-11-07
Payer: COMMERCIAL

## 2024-11-07 DIAGNOSIS — Z23 INFLUENZA VACCINE ADMINISTERED: Primary | ICD-10-CM

## 2024-11-07 DIAGNOSIS — Z23 COVID-19 VACCINE ADMINISTERED: Primary | ICD-10-CM

## 2024-11-07 PROCEDURE — G0008 ADMIN INFLUENZA VIRUS VAC: HCPCS | Mod: S$GLB,,, | Performed by: INTERNAL MEDICINE

## 2024-11-07 PROCEDURE — 90653 IIV ADJUVANT VACCINE IM: CPT | Mod: S$GLB,,, | Performed by: INTERNAL MEDICINE

## 2024-11-07 PROCEDURE — 99999 PR PBB SHADOW E&M-EST. PATIENT-LVL I: CPT | Mod: PBBFAC,,,

## 2024-11-07 PROCEDURE — 90480 ADMN SARSCOV2 VAC 1/ONLY CMP: CPT | Mod: S$GLB,,, | Performed by: INTERNAL MEDICINE

## 2024-11-07 PROCEDURE — 91320 SARSCV2 VAC 30MCG TRS-SUC IM: CPT | Mod: S$GLB,,, | Performed by: INTERNAL MEDICINE

## 2024-11-07 PROCEDURE — 99999 PR PBB SHADOW E&M-EST. PATIENT-LVL II: CPT | Mod: PBBFAC,,,

## 2024-11-07 NOTE — PROGRESS NOTES
Administered High Dose Flu vaccine IM to left deltoid.  Patient tolerated injection well.  Patient advised to wait in lobby for 15 minutes for observation and to report any adverse reactions immediately.  Patient verbalized understanding.

## 2024-11-11 ENCOUNTER — PROCEDURE VISIT (OUTPATIENT)
Dept: OBSTETRICS AND GYNECOLOGY | Facility: CLINIC | Age: 69
End: 2024-11-11
Payer: COMMERCIAL

## 2024-11-11 VITALS — SYSTOLIC BLOOD PRESSURE: 140 MMHG | DIASTOLIC BLOOD PRESSURE: 80 MMHG | BODY MASS INDEX: 44 KG/M2 | WEIGHT: 225.31 LBS

## 2024-11-11 DIAGNOSIS — N76.3 CHRONIC VULVITIS: ICD-10-CM

## 2024-11-11 DIAGNOSIS — N95.0 POSTMENOPAUSAL VAGINAL BLEEDING: Primary | ICD-10-CM

## 2024-11-11 PROCEDURE — 58100 BIOPSY OF UTERUS LINING: CPT | Mod: S$GLB,,, | Performed by: OBSTETRICS & GYNECOLOGY

## 2024-11-11 RX ORDER — CLOTRIMAZOLE AND BETAMETHASONE DIPROPIONATE 10; .64 MG/G; MG/G
CREAM TOPICAL 2 TIMES DAILY
Qty: 45 G | Refills: 4 | Status: SHIPPED | OUTPATIENT
Start: 2024-11-11

## 2024-11-11 NOTE — PROCEDURES
Endometrial biopsy    Date/Time: 11/11/2024 10:45 AM    Performed by: Ladan Maya MD  Authorized by: Ladan Maya MD    Consent:     Patient questions answered: yes      Patient agrees, verbalizes understanding, and wants to proceed: yes      Educational handouts given: yes      Instructions and paperwork completed: yes    Indication:     Indications: Post-menopausal bleeding      Chronicity of post-menopausal bleeding:  Chronic    Progression of post-menopausal bleeding:  Waxing and waning  Pre-procedure:     Pre-procedure timeout performed: yes    Procedure:     Procedure: endometrial biopsy with Pipelle      Cervix cleaned and prepped: yes      A paracervical block was performed: no      An intracervical block was performed: no      Uterus sounded: yes      Uterus sound depth (cm):  9    Curettes used:  1    Specimen collected: specimen collected and sent to pathology      Patient tolerated procedure well with no complications: yes        1. Postmenopausal vaginal bleeding (Primary)  - Specimen to Pathology, Ob/Gyn  - Endometrial biopsy    2. Chronic vulvitis  - clotrimazole-betamethasone 1-0.05% (LOTRISONE) cream; Apply topically 2 (two) times daily.  Dispense: 45 g; Refill: 4

## 2024-11-19 DIAGNOSIS — N85.02 ENDOMETRIAL HYPERPLASIA WITH ATYPIA: Primary | ICD-10-CM

## 2024-12-23 ENCOUNTER — TELEPHONE (OUTPATIENT)
Dept: FAMILY MEDICINE | Facility: CLINIC | Age: 69
End: 2024-12-23
Payer: COMMERCIAL

## 2024-12-23 DIAGNOSIS — I10 ESSENTIAL HYPERTENSION: Primary | ICD-10-CM

## 2024-12-23 NOTE — TELEPHONE ENCOUNTER
----- Message from Reena sent at 12/23/2024 11:30 AM CST -----  Regarding:   Caller is requesting to schedule their Lab appointment prior to annual appointment.    Order is not listed in EPIC.  Please enter order and contact patient to schedule.    Name of Caller:     Preferred Date and Time of Labs: 1/3/25 at 11:30    Date of Annual Appointment: 1/15/25    Where would they like the lab performed? [facility]    Would the patient rather a call back or a response via [facility]?    Best Call Back Number: [phone]      Additional Information:    Thank you.

## 2025-01-03 ENCOUNTER — LAB VISIT (OUTPATIENT)
Dept: LAB | Facility: HOSPITAL | Age: 70
End: 2025-01-03
Attending: INTERNAL MEDICINE
Payer: COMMERCIAL

## 2025-01-03 DIAGNOSIS — I10 ESSENTIAL HYPERTENSION: ICD-10-CM

## 2025-01-03 LAB
BASOPHILS # BLD AUTO: 0.04 K/UL (ref 0–0.2)
BASOPHILS NFR BLD: 0.7 % (ref 0–1.9)
DIFFERENTIAL METHOD BLD: ABNORMAL
EOSINOPHIL # BLD AUTO: 0.2 K/UL (ref 0–0.5)
EOSINOPHIL NFR BLD: 2.8 % (ref 0–8)
ERYTHROCYTE [DISTWIDTH] IN BLOOD BY AUTOMATED COUNT: 14.3 % (ref 11.5–14.5)
HCT VFR BLD AUTO: 38.3 % (ref 37–48.5)
HGB BLD-MCNC: 12.6 G/DL (ref 12–16)
IMM GRANULOCYTES # BLD AUTO: 0.02 K/UL (ref 0–0.04)
IMM GRANULOCYTES NFR BLD AUTO: 0.4 % (ref 0–0.5)
LYMPHOCYTES # BLD AUTO: 1.5 K/UL (ref 1–4.8)
LYMPHOCYTES NFR BLD: 28.2 % (ref 18–48)
MCH RBC QN AUTO: 31.3 PG (ref 27–31)
MCHC RBC AUTO-ENTMCNC: 32.9 G/DL (ref 32–36)
MCV RBC AUTO: 95 FL (ref 82–98)
MONOCYTES # BLD AUTO: 0.5 K/UL (ref 0.3–1)
MONOCYTES NFR BLD: 9.9 % (ref 4–15)
NEUTROPHILS # BLD AUTO: 3.1 K/UL (ref 1.8–7.7)
NEUTROPHILS NFR BLD: 58 % (ref 38–73)
NRBC BLD-RTO: 0 /100 WBC
PLATELET # BLD AUTO: 239 K/UL (ref 150–450)
PMV BLD AUTO: 9.9 FL (ref 9.2–12.9)
RBC # BLD AUTO: 4.03 M/UL (ref 4–5.4)
WBC # BLD AUTO: 5.35 K/UL (ref 3.9–12.7)

## 2025-01-03 PROCEDURE — 85025 COMPLETE CBC W/AUTO DIFF WBC: CPT | Performed by: INTERNAL MEDICINE

## 2025-01-03 PROCEDURE — 36415 COLL VENOUS BLD VENIPUNCTURE: CPT | Mod: PO | Performed by: INTERNAL MEDICINE

## 2025-01-13 ENCOUNTER — PATIENT MESSAGE (OUTPATIENT)
Dept: OBSTETRICS AND GYNECOLOGY | Facility: CLINIC | Age: 70
End: 2025-01-13

## 2025-01-13 ENCOUNTER — OFFICE VISIT (OUTPATIENT)
Dept: OBSTETRICS AND GYNECOLOGY | Facility: CLINIC | Age: 70
End: 2025-01-13
Payer: COMMERCIAL

## 2025-01-13 ENCOUNTER — HOSPITAL ENCOUNTER (OUTPATIENT)
Dept: RADIOLOGY | Facility: HOSPITAL | Age: 70
Discharge: HOME | End: 2025-01-13
Attending: OBSTETRICS & GYNECOLOGY
Payer: COMMERCIAL

## 2025-01-13 ENCOUNTER — HOSPITAL ENCOUNTER (OUTPATIENT)
Dept: PREADMISSION TESTING | Facility: HOSPITAL | Age: 70
Discharge: HOME | End: 2025-01-13
Attending: OBSTETRICS & GYNECOLOGY
Payer: COMMERCIAL

## 2025-01-13 VITALS
TEMPERATURE: 97 F | RESPIRATION RATE: 18 BRPM | HEIGHT: 60 IN | WEIGHT: 232.5 LBS | BODY MASS INDEX: 45.65 KG/M2 | DIASTOLIC BLOOD PRESSURE: 70 MMHG | SYSTOLIC BLOOD PRESSURE: 140 MMHG | OXYGEN SATURATION: 97 % | HEART RATE: 67 BPM

## 2025-01-13 VITALS
DIASTOLIC BLOOD PRESSURE: 70 MMHG | SYSTOLIC BLOOD PRESSURE: 140 MMHG | WEIGHT: 233.25 LBS | BODY MASS INDEX: 45.55 KG/M2

## 2025-01-13 DIAGNOSIS — N85.01 COMPLEX ENDOMETRIAL HYPERPLASIA WITHOUT ATYPIA: Primary | ICD-10-CM

## 2025-01-13 DIAGNOSIS — Z01.818 PREOP TESTING: Primary | ICD-10-CM

## 2025-01-13 LAB
ALBUMIN SERPL BCP-MCNC: 3.7 G/DL (ref 3.5–5.2)
ALP SERPL-CCNC: 98 U/L (ref 40–150)
ALT SERPL W/O P-5'-P-CCNC: 26 U/L (ref 10–44)
ANION GAP SERPL CALC-SCNC: 9 MMOL/L (ref 8–16)
AST SERPL-CCNC: 20 U/L (ref 10–40)
BILIRUB SERPL-MCNC: 0.6 MG/DL (ref 0.1–1)
BUN SERPL-MCNC: 21 MG/DL (ref 8–23)
CALCIUM SERPL-MCNC: 9.2 MG/DL (ref 8.7–10.5)
CHLORIDE SERPL-SCNC: 104 MMOL/L (ref 95–110)
CO2 SERPL-SCNC: 24 MMOL/L (ref 23–29)
CREAT SERPL-MCNC: 0.9 MG/DL (ref 0.5–1.4)
EST. GFR  (NO RACE VARIABLE): >60 ML/MIN/1.73 M^2
GLUCOSE SERPL-MCNC: 87 MG/DL (ref 70–110)
POTASSIUM SERPL-SCNC: 3.9 MMOL/L (ref 3.5–5.1)
PROT SERPL-MCNC: 7.6 G/DL (ref 6–8.4)
SODIUM SERPL-SCNC: 137 MMOL/L (ref 136–145)

## 2025-01-13 PROCEDURE — 80053 COMPREHEN METABOLIC PANEL: CPT | Performed by: OBSTETRICS & GYNECOLOGY

## 2025-01-13 PROCEDURE — 71046 X-RAY EXAM CHEST 2 VIEWS: CPT | Mod: 26,,, | Performed by: STUDENT IN AN ORGANIZED HEALTH CARE EDUCATION/TRAINING PROGRAM

## 2025-01-13 PROCEDURE — 71046 X-RAY EXAM CHEST 2 VIEWS: CPT | Mod: TC,FY

## 2025-01-13 PROCEDURE — 99499 UNLISTED E&M SERVICE: CPT | Mod: S$GLB,,, | Performed by: OBSTETRICS & GYNECOLOGY

## 2025-01-13 PROCEDURE — 93010 ELECTROCARDIOGRAM REPORT: CPT | Mod: ,,, | Performed by: INTERNAL MEDICINE

## 2025-01-13 PROCEDURE — 93005 ELECTROCARDIOGRAM TRACING: CPT

## 2025-01-13 PROCEDURE — 99999 PR PBB SHADOW E&M-EST. PATIENT-LVL III: CPT | Mod: PBBFAC,,, | Performed by: OBSTETRICS & GYNECOLOGY

## 2025-01-13 RX ORDER — SODIUM CHLORIDE, SODIUM LACTATE, POTASSIUM CHLORIDE, CALCIUM CHLORIDE 600; 310; 30; 20 MG/100ML; MG/100ML; MG/100ML; MG/100ML
INJECTION, SOLUTION INTRAVENOUS CONTINUOUS
Status: CANCELLED | OUTPATIENT
Start: 2025-01-13

## 2025-01-13 RX ORDER — FAMOTIDINE 20 MG/1
20 TABLET, FILM COATED ORAL
Status: CANCELLED | OUTPATIENT
Start: 2025-01-13

## 2025-01-13 RX ORDER — CEFAZOLIN SODIUM 2 G/50ML
2 SOLUTION INTRAVENOUS
Status: CANCELLED | OUTPATIENT
Start: 2025-01-13

## 2025-01-13 RX ORDER — MUPIROCIN 20 MG/G
OINTMENT TOPICAL
Status: CANCELLED | OUTPATIENT
Start: 2025-01-13

## 2025-01-13 NOTE — DISCHARGE INSTRUCTIONS
YOUR PROCEDURE WILL BE AT [facility] at [address] [place] [address]                 Enter through the Main Entrance facing Allyson Yadav.                 Report to the Same Day Surgery Registration Desk in the hallway.(Just beside the Same Day Surgery Unit)      Your procedure  is scheduled for 1/21/2025__________.    Call [phone] between 2pm and 5pm on _1/17/2025______to find out your arrival time for the day of surgery.    You may have two visitors.  No children under 12 years old.     You will be going to the Same Day Surgery Unit on the 2nd floor of the hospital.    Important instructions:  Do not eat anything after midnight.  You may have plain water, non carbonated.  You may also have Gatorade or Powerade after midnight.    Stop all fluids 2 hours before your surgery.    It is okay to brush your teeth.  Do not have gum, candy or mints.    SEE MEDICATION SHEET.   TAKE MEDICATIONS AS DIRECTED.      Do not take any diabetic medication on the morning of surgery unless instructed to do so by your doctor or pre op nurse.      All GLP-1 weekly diabetic/weight loss medications must not be taken for one week before your surgery, or your surgery could be canceled.      STOP taking for 7 days before surgery:    Aspirin           Voltaren (Diclofenac)  Ibuprofen  (Advil, Motrin)                Indomethocin  Mobic (meloxicam, celebrex)      Etodolac   Aleve (naproxen)          Toradol (ketoralac)  Fish oil, Krill oil and Vitamin E  Headache Powders (BC Powder, Goody's Powder, Stanback)                           You may take Tylenol if needed which is not a blood thinner.    Please shower the night before and the morning of your surgery.      Follow any Prep Instructions given by your surgeon.    Use Chlorhexidine soap as instructed by your pre op nurse.   Please place clean linens on your bed the night before surgery. Please wear fresh clean clothing after each shower.    No  shaving of procedural area at least 4-5 days before surgery due to increased risk of skin irritation and/or possible infection.    Female patients may be asked for a urine specimen on the morning of the surgery.  Please check with your nurse before using the restroom.    Contact lenses and removable denture work may not be worn during your procedure.    You may wear deodorant only. If you are having breast surgery, do not wear deodorant on the operative side.    Do not wear powder, body lotion, perfume/cologne or make-up.    Do not wear any jewelry or have any metal on your body.    You will be asked to remove any dentures or partials for the procedure.    If you are going home on the same day of surgery, you must arrange for a family member or a friend to drive you home.  Public transportation is prohibited.  You will not be able to drive home if you were given anesthesia or sedation.    Patients who want to have their Post-op prescriptions filled from our in-house [facility], bring a Credit/Debit Card or cash with you. A co-pay may be required.  The pharmacy closes at 5:30 pm.    Wear loose fitting clothes allowing for bandages.    Please leave money and valuables home.      You may bring your cell phone.    Call the doctor if fever or illness should occur before your surgery.    Call [phone] to contact us here if needed.                            CLOTHES ON DAY OF SURGERY    SHOULDER surgery:  you must have a very oversized shirt.  Very, Very large.  You will probably have a large sling on with your arm strapped to your chest.  You will not be able to put the arm of the operated shoulder into a sleeve.  You can put the arm of the un-operated shoulder into the sleeve, but the shirt will need to be draped over the operated shoulder.       ARM or HAND surgery:  make sure that your sleeves are large and loose enough to pass over large dressings or cast.      BREAST or UNDERARM surgery:  wear a loose, button  down shirt so that you can dress without raising your arms over your head.    ABDOMINAL surgery:  wear loose, comfortable clothing.  Nothing tight around the abdomen.  NO JEANS    PENIS or SCROTAL surgery:  loose comfortable clothing.  Large sweat pants, pajama pants or a robe.  ABSOLUTELY NO JEANS      LEG or FOOT surgery:  wear large loose pants that are able to pass over any large dressings or casts.  You could also wear loose shorts or a skirt.

## 2025-01-13 NOTE — H&P
Subjective     Patient ID: Apoorva Fernandez is a 70 y.o. female.    Chief Complaint:  Pre-op Exam      History of Present Illness  HPI   Patient had one episode of postmenopausal bleeding. Otherwise, no complaints. No bleeding for last 5 years. EMBx shows endometrial hyperplasia without atypia.  Patient advised of medical vs surgical treatment options.  At this time, she would like to proceed with TLH with BSO.  Case request sent.     GYN & OB History  Patient's last menstrual period was 2015 (exact date).   Date of Last Pap: 2018    OB History    Para Term  AB Living   2 2 2     2   SAB IAB Ectopic Multiple Live Births                  # Outcome Date GA Lbr Wally/2nd Weight Sex Type Anes PTL Lv   2 Term            1 Term              Past Medical History:  Past Medical History:   Diagnosis Date    Allergic rhinitis     Allergy     Arthritis     Joint pain     Keloid cicatrix     Obesity     Osteoarthritis     Personal history of colonic polyps 2010    Adenomatous    Personal history of peptic ulcer disease     PONV (postoperative nausea and vomiting)     Sleep apnea     Umbilical hernia        Past Surgical History:  Past Surgical History:   Procedure Laterality Date    Abdominal/umbilical hernia repair      removed small part of small bowel; mesh placed     SECTION, LOW TRANSVERSE      X2    CHOLECYSTECTOMY      COLONOSCOPY N/A 2019    Procedure: COLONOSCOPY;  Surgeon: Chandrika Ho MD;  Location: [facility] ENDO;  Service: Endoscopy;  Laterality: N/A;    COLONOSCOPY N/A 2024    Procedure: COLONOSCOPY;  Surgeon: Stephanie Matta MD;  Location: [facility] ENDO;  Service: Endoscopy;  Laterality: N/A;  Ref by DEE DEE Martin, portal - PC  24- lvm and portal msg for pc. DBM  24- lvm for pc. DBM    Open laparoscopy for a ruptured ulcer      TOTAL KNEE ARTHROPLASTY Right 08/15/2022    Procedure: ARTHROPLASTY, KNEE, TOTAL- JOAN;  Surgeon: Jass Willis MD;   Location: [facility] OR;  Service: Orthopedics;  Laterality: Right;  NAE MIL SALAGDO [phone] TEXTED HIM @ 7:06AM  ON 27:06AM ON 2022  1ST BALWINDER DAVIS [phone] TEXTED HER @ 7:16AM ON 2022 REPLIED @ 7:18AM ON 2022  NOON START  SUPINE  RN PREOP 2022    T/S ON 2022       HAS MEDICAL TESHA       Family History:  Family History   Problem Relation Name Age of Onset    Breast cancer Mother Jackie Camacho          at age 65    Cancer Father          Bladder    Glaucoma Father      Hypertension Father      Heart disease Father      Obesity Sister Suyapa     Heart disease Brother Pankaj     Sleep apnea Brother Reji     Diabetes Neg Hx      Melanoma Neg Hx         Allergies:  Review of patient's allergies indicates:   Allergen Reactions    Amlodipine Other (See Comments)     Chest pressure    Antihistamines - alkylamine Shortness Of Breath    Losartan Shortness Of Breath    Atorvastatin      Other reaction(s): myalgia  Other reaction(s): myalgia    Ezetimibe      Other reaction(s): muscle cramps  Other reaction(s): muscle cramps    Pravastatin Other (See Comments)      myalgia    Lactose intolerance (lactase) [lactase] Nausea Only       Medications:  Current Outpatient Medications on File Prior to Visit   Medication Sig Dispense Refill    aspirin (ECOTRIN) 81 MG EC tablet Take 1 tablet (81 mg total) by mouth once daily.      clotrimazole-betamethasone 1-0.05% (LOTRISONE) cream Apply topically 2 (two) times daily. 45 g 4    fluticasone propionate (FLONASE) 50 mcg/actuation nasal spray SPRAY 2 SPRAYs INTO EACH NOSTRIL ONCE EVERY DAY 48 mL 0    multivitamin (ONE DAILY MULTIVITAMIN) per tablet Take 1 tablet by mouth once daily.      omega-3 fatty acids/fish oil (FISH OIL-OMEGA-3 FATTY ACIDS) 300-1,000 mg capsule Take by mouth once daily.      triamterene-hydrochlorothiazide 37.5-25 mg (MAXZIDE-25) 37.5-25 mg per tablet Take HALF a tablet by mouth once daily. 45 tablet 0    coenzyme Q10 200  mg capsule Take 1 capsules (200 mg) by mouth once daily for 14 days. 14 capsule 0    GAVILYTE-G 236-22.74-6.74 -5.86 gram suspension Take 4,000 mLs by mouth once. (Patient not taking: Reported on 1/13/2025)      nitroGLYCERIN (NITROSTAT) 0.4 MG SL tablet Place 1 tablet (0.4 mg total) under the tongue every 5 (five) minutes as needed for Chest pain. (Patient not taking: Reported on 1/13/2025) 25 tablet 0     No current facility-administered medications on file prior to visit.       Social History:  Social History     Tobacco Use    Smoking status: Never     Passive exposure: Never    Smokeless tobacco: Never   Substance Use Topics    Alcohol use: Yes     Alcohol/week: 2.0 - 3.0 standard drinks of alcohol     Types: 2 - 3 Glasses of wine per week     Comment: Socially on the weekends    Drug use: No             Review of Systems  Review of Systems   Constitutional: Negative.    HENT: Negative.     Eyes: Negative.    Respiratory: Negative.     Cardiovascular: Negative.    Gastrointestinal: Negative.    Endocrine: Negative.    Genitourinary: Negative.  Positive for postmenopausal bleeding.   Musculoskeletal: Negative.    Integumentary:  Negative.   Neurological: Negative.    Hematological: Negative.    Psychiatric/Behavioral: Negative.     Breast: negative.           Objective   Physical Exam:   Constitutional: She is oriented to person, place, and time. She appears well-developed.    HENT:   Head: Normocephalic and atraumatic.    Eyes: EOM are normal.     Cardiovascular:  Normal rate.             Pulmonary/Chest: Effort normal.        Abdominal: Soft. There is no abdominal tenderness.             Musculoskeletal: Normal range of motion.       Neurological: She is oriented to person, place, and time.    Skin: Skin is warm.    Psychiatric: She has a normal mood and affect.      Results for orders placed during the hospital encounter of 09/30/24    US Pelvis Comp with Transvag NON-OB (xpd    Narrative  EXAMINATION:  US  PELVIS COMP WITH TRANSVAG NON-OB (XPD)    CLINICAL HISTORY:  Postmenopausal bleeding    FINDINGS:  The uterus measures 9.4 x 4.3 x 3.2 cm, the endometrium 4.6 mm.    Right ovary not visualized.    Left ovary measures 1.4 x 1.3 x 1.5 cm, RI 0.44.    There is a 2.3 cm fundal fibroid.    Impression  No acute process seen.    2.3 cm fibroid.      Electronically signed by: Paul Hernández MD  Date:    09/30/2024  Time:    15:45            Pathology from 11/11/24:  Endometrial, biopsy:   Polypoidal fragment of proliferative glands in stroma with complex hyperplasia without atypia   Squamous epithelium and scattered strips of inactive/atrophic glandular epithelium   No evidence of malignancy      Assessment and Plan     1. Complex endometrial hyperplasia without atypia             Plan:  1. Complex endometrial hyperplasia without atypia (Primary)  - Risks, benefits, and alternatives of total laparoscopic hysterectomy with BSO reviewed with patient.  She voiced understanding.  All questions answered.  Consents are signed and on the chart.       - Place in Outpatient; Standing  - Full code; Standing  - Vital signs; Standing  - Insert peripheral IV; Standing  - Lejia to Gravity; Standing  - POCT glucose; Standing  - Notify physician if BS > 180 for hysterectomy patients; Standing  - Chlorhexidine (CHG) 2% Wipes; Standing  - Notify Physician/Vital Signs Parameters Urine output less than 0.5mL/kg/hr (with indwelling catheter) or 30 mL/hr (without indwelling catheter) or blood glucose greater than 200 mg/dL; Standing  - Notify physician; Standing  - Notify Physician - Potential Need of Opioid Reversal; Standing  - Diet NPO; Standing  - Place sequential compression device; Standing  - Chlorohexidine Gluconate Bath; Standing  - Comprehensive metabolic panel; Standing  - Pregnancy, urine rapid; Standing  - Type & Screen Pre Op; Standing  - Diet NPO; Standing  - CBC auto differential; Standing

## 2025-01-15 ENCOUNTER — OFFICE VISIT (OUTPATIENT)
Dept: FAMILY MEDICINE | Facility: CLINIC | Age: 70
End: 2025-01-15
Payer: COMMERCIAL

## 2025-01-15 ENCOUNTER — LAB VISIT (OUTPATIENT)
Dept: LAB | Facility: HOSPITAL | Age: 70
End: 2025-01-15
Attending: OBSTETRICS & GYNECOLOGY
Payer: COMMERCIAL

## 2025-01-15 VITALS
HEART RATE: 69 BPM | WEIGHT: 232.69 LBS | HEIGHT: 60 IN | OXYGEN SATURATION: 95 % | BODY MASS INDEX: 45.68 KG/M2 | TEMPERATURE: 98 F | SYSTOLIC BLOOD PRESSURE: 122 MMHG | DIASTOLIC BLOOD PRESSURE: 66 MMHG

## 2025-01-15 DIAGNOSIS — E78.2 MIXED HYPERLIPIDEMIA: ICD-10-CM

## 2025-01-15 DIAGNOSIS — T46.6X5A STATIN MYOPATHY: ICD-10-CM

## 2025-01-15 DIAGNOSIS — J30.9 ALLERGIC RHINITIS, UNSPECIFIED SEASONALITY, UNSPECIFIED TRIGGER: ICD-10-CM

## 2025-01-15 DIAGNOSIS — M17.0 PRIMARY OSTEOARTHRITIS OF BOTH KNEES: ICD-10-CM

## 2025-01-15 DIAGNOSIS — G72.0 STATIN MYOPATHY: ICD-10-CM

## 2025-01-15 DIAGNOSIS — M06.00 SERONEGATIVE RHEUMATOID ARTHRITIS: ICD-10-CM

## 2025-01-15 DIAGNOSIS — I77.1 TORTUOUS AORTA: ICD-10-CM

## 2025-01-15 DIAGNOSIS — Z00.00 ROUTINE MEDICAL EXAM: Primary | ICD-10-CM

## 2025-01-15 DIAGNOSIS — E66.01 MORBID OBESITY WITH BMI OF 45.0-49.9, ADULT: ICD-10-CM

## 2025-01-15 DIAGNOSIS — J06.9 UPPER RESPIRATORY TRACT INFECTION, UNSPECIFIED TYPE: ICD-10-CM

## 2025-01-15 DIAGNOSIS — G47.33 OSA (OBSTRUCTIVE SLEEP APNEA): ICD-10-CM

## 2025-01-15 DIAGNOSIS — Z12.31 SCREENING MAMMOGRAM, ENCOUNTER FOR: ICD-10-CM

## 2025-01-15 DIAGNOSIS — Z01.818 PREOP TESTING: ICD-10-CM

## 2025-01-15 DIAGNOSIS — I10 ESSENTIAL HYPERTENSION: ICD-10-CM

## 2025-01-15 DIAGNOSIS — Z29.11 NEED FOR PROPHYLACTIC VACCINATION AND INOCULATION AGAINST RESPIRATORY SYNCYTIAL VIRUS (RSV): ICD-10-CM

## 2025-01-15 DIAGNOSIS — N95.0 POSTMENOPAUSAL BLEEDING: ICD-10-CM

## 2025-01-15 LAB
ABO + RH BLD: NORMAL
BLD GP AB SCN CELLS X3 SERPL QL: NORMAL
OHS QRS DURATION: 152 MS
OHS QTC CALCULATION: 490 MS
SPECIMEN OUTDATE: NORMAL

## 2025-01-15 PROCEDURE — 1101F PT FALLS ASSESS-DOCD LE1/YR: CPT | Mod: CPTII,S$GLB,, | Performed by: INTERNAL MEDICINE

## 2025-01-15 PROCEDURE — 1126F AMNT PAIN NOTED NONE PRSNT: CPT | Mod: CPTII,S$GLB,, | Performed by: INTERNAL MEDICINE

## 2025-01-15 PROCEDURE — 36415 COLL VENOUS BLD VENIPUNCTURE: CPT | Performed by: OBSTETRICS & GYNECOLOGY

## 2025-01-15 PROCEDURE — 99397 PER PM REEVAL EST PAT 65+ YR: CPT | Mod: S$GLB,,, | Performed by: INTERNAL MEDICINE

## 2025-01-15 PROCEDURE — 3078F DIAST BP <80 MM HG: CPT | Mod: CPTII,S$GLB,, | Performed by: INTERNAL MEDICINE

## 2025-01-15 PROCEDURE — 1159F MED LIST DOCD IN RCRD: CPT | Mod: CPTII,S$GLB,, | Performed by: INTERNAL MEDICINE

## 2025-01-15 PROCEDURE — 99999 PR PBB SHADOW E&M-EST. PATIENT-LVL IV: CPT | Mod: PBBFAC,,, | Performed by: INTERNAL MEDICINE

## 2025-01-15 PROCEDURE — 3074F SYST BP LT 130 MM HG: CPT | Mod: CPTII,S$GLB,, | Performed by: INTERNAL MEDICINE

## 2025-01-15 PROCEDURE — 1160F RVW MEDS BY RX/DR IN RCRD: CPT | Mod: CPTII,S$GLB,, | Performed by: INTERNAL MEDICINE

## 2025-01-15 PROCEDURE — 3288F FALL RISK ASSESSMENT DOCD: CPT | Mod: CPTII,S$GLB,, | Performed by: INTERNAL MEDICINE

## 2025-01-15 PROCEDURE — 86900 BLOOD TYPING SEROLOGIC ABO: CPT | Performed by: OBSTETRICS & GYNECOLOGY

## 2025-01-15 PROCEDURE — 3008F BODY MASS INDEX DOCD: CPT | Mod: CPTII,S$GLB,, | Performed by: INTERNAL MEDICINE

## 2025-01-15 RX ORDER — TRIAMTERENE/HYDROCHLOROTHIAZID 37.5-25 MG
0.5 TABLET ORAL DAILY
Qty: 45 TABLET | Refills: 2 | Status: SHIPPED | OUTPATIENT
Start: 2025-01-15

## 2025-01-15 RX ORDER — FLUTICASONE PROPIONATE 50 MCG
SPRAY, SUSPENSION (ML) NASAL
Qty: 48 ML | Refills: 2 | Status: SHIPPED | OUTPATIENT
Start: 2025-01-15

## 2025-01-15 NOTE — PROGRESS NOTES
CHIEF COMPLAINT:   Chief Complaint   Patient presents with    Annual Exam          HISTORY OF PRESENT ILLNESS:  Apoorva Fernandez is a 70 y.o. female who presents to the clinic today for a routine physical exam. Her last physical exam was approximately 1 years(s) ago.          Patient is scheduled for a hysterectomy next Tuesday. The surgeon plans to perform the procedure laparoscopically, but due to the patient's history of hernia repairs and surgeries, there is a possibility of encountering significant scar tissue which may necessitate a different approach. Patient reports minimal postmenopausal bleeding, which is the primary reason for the hysterectomy.     Patient recently had a respiratory illness and coughed throughout the night prior to her chest XR. She is concerned about her chest XR results, specifically an observation of cardiomegaly which she states had not been previously communicated to her.    Patient has hypercholesterolemia and was hospitalized for 1 week last January. Following this hospitalization, additional labs and tests were performed, after which she was discharged and instructed to return if any issues arose.    Patient takes CoQ10 daily and typically takes krill oil, though she has temporarily discontinued both in preparation for her upcoming surgery. She denies any current cardiac issues or symptoms, known heart rhythm abnormalities, or episodes of tachycardia. She is unable to tolerate statin medication.         Subjective    PAST MEDICAL HISTORY:  Past Medical History:   Diagnosis Date    Allergic rhinitis     Allergy     Arthritis     Joint pain     Keloid cicatrix     Obesity     Osteoarthritis     Personal history of colonic polyps 08/2010    Adenomatous    Personal history of peptic ulcer disease     PONV (postoperative nausea and vomiting)     Sleep apnea     Umbilical hernia        PAST SURGICAL HISTORY:  Past Surgical History:   Procedure Laterality Date    Abdominal/umbilical  hernia repair      x2, removed small part of small bowel; mesh placed     SECTION, LOW TRANSVERSE      X2    CHOLECYSTECTOMY      COLONOSCOPY N/A 2019    Procedure: COLONOSCOPY;  Surgeon: Chandrika oH MD;  Location: [facility] ENDO;  Service: Endoscopy;  Laterality: N/A;    COLONOSCOPY N/A 2024    Procedure: COLONOSCOPY;  Surgeon: Stephanie Matta MD;  Location: [facility] ENDO;  Service: Endoscopy;  Laterality: N/A;  Ref by WILLIAN Rangel, PEG, portal - PC  24- lvm and portal msg for pc. DBM  24- lvm for pc. DBM    EXPLORATORY LAPAROTOMY      ruptured peptic ulcer    TOTAL KNEE ARTHROPLASTY Right 08/15/2022    Procedure: ARTHROPLASTY, KNEE, TOTAL- JOAN;  Surgeon: Jass Willis MD;  Location: [facility] OR;  Service: Orthopedics;  Laterality: Right;  NAE MIL SALGADO [phone] TEXTED HIM @ 7:06AM  ON 27:06AM ON 2022  1ST ASSIST SUSAN [phone] TEXTED HER @ 7:16AM ON 2022 REPLIED @ 7:18AM ON 2022  NOON START  SUPINE  RN PREOP 2022    T/S ON 2022       HAS MEDICAL TESHA       SOCIAL HISTORY:  Social History     Socioeconomic History    Marital status:     Number of children: 2   Occupational History    Occupation:    Tobacco Use    Smoking status: Never     Passive exposure: Never    Smokeless tobacco: Never   Substance and Sexual Activity    Alcohol use: Yes     Alcohol/week: 2.0 - 3.0 standard drinks of alcohol     Types: 2 - 3 Glasses of wine per week     Comment: Socially on the weekends    Drug use: No    Sexual activity: Yes     Partners: Male     Social Drivers of Health     Financial Resource Strain: Low Risk  (2024)    Overall Financial Resource Strain (CARDIA)     Difficulty of Paying Living Expenses: Not hard at all   Food Insecurity: No Food Insecurity (2024)    Hunger Vital Sign     Worried About Running Out of Food in the Last Year: Never true     Ran Out of Food in the Last Year: Never true   Transportation  Needs: No Transportation Needs (2024)    PRAPARE - Transportation     Lack of Transportation (Medical): No     Lack of Transportation (Non-Medical): No   Physical Activity: Inactive (2024)    Exercise Vital Sign     Days of Exercise per Week: 0 days     Minutes of Exercise per Session: 0 min   Stress: No Stress Concern Present (2024)    [employer] - Occupational Stress Questionnaire     Feeling of Stress : Not at all   Housing Stability: Low Risk  (2024)    Housing Stability Vital Sign     Unable to Pay for Housing in the Last Year: No     Number of Places Lived in the Last Year: 1     Unstable Housing in the Last Year: No       FAMILY HISTORY:  Family History   Problem Relation Name Age of Onset    Breast cancer Mother Jackie Camacho          at age 65    Cancer Father          Bladder    Glaucoma Father      Hypertension Father      Heart disease Father      Obesity Sister Suyapa     Heart disease Brother Pankaj     Sleep apnea Brother Reji     Diabetes Neg Hx      Melanoma Neg Hx         ALLERGIES AND MEDICATIONS: updated and reviewed.  Review of patient's allergies indicates:   Allergen Reactions    Amlodipine Other (See Comments)     Chest pressure    Antihistamines - alkylamine Shortness Of Breath    Losartan Shortness Of Breath    Atorvastatin      Other reaction(s): myalgia  Other reaction(s): myalgia    Ezetimibe      Other reaction(s): muscle cramps  Other reaction(s): muscle cramps    Pravastatin Other (See Comments)      myalgia    Lactose intolerance (lactase) [lactase] Nausea Only     Medication List with Changes/Refills   Current Medications    ASPIRIN (ECOTRIN) 81 MG EC TABLET    Take 1 tablet (81 mg total) by mouth once daily.    CLOTRIMAZOLE-BETAMETHASONE 1-0.05% (LOTRISONE) CREAM    Apply topically 2 (two) times daily.    COENZYME Q10 200 MG CAPSULE    Take 1 capsules (200 mg) by mouth once daily for 14 days.    MULTIVITAMIN (ONE DAILY  MULTIVITAMIN) PER TABLET    Take 1 tablet by mouth once daily.    NITROGLYCERIN (NITROSTAT) 0.4 MG SL TABLET    Place 1 tablet (0.4 mg total) under the tongue every 5 (five) minutes as needed for Chest pain.    OMEGA-3 FATTY ACIDS/FISH OIL (FISH OIL-OMEGA-3 FATTY ACIDS) 300-1,000 MG CAPSULE    Take by mouth once daily.   Changed and/or Refilled Medications    Modified Medication Previous Medication    FLUTICASONE PROPIONATE (FLONASE) 50 MCG/ACTUATION NASAL SPRAY fluticasone propionate (FLONASE) 50 mcg/actuation nasal spray       SPRAY 2 SPRAYs INTO EACH NOSTRIL ONCE EVERY DAY    SPRAY 2 SPRAYs INTO EACH NOSTRIL ONCE EVERY DAY    TRIAMTERENE-HYDROCHLOROTHIAZIDE 37.5-25 MG (MAXZIDE-25) 37.5-25 MG PER TABLET triamterene-hydrochlorothiazide 37.5-25 mg (MAXZIDE-25) 37.5-25 mg per tablet       Take HALF a tablet by mouth once daily.    Take HALF a tablet by mouth once daily.          CARE TEAM:  Patient Care Team:  Najma Rangel MD as PCP - General (Internal Medicine)  [place] LPN as Care Coordinator  Dr. Sj Fraga (Optometry)       SCREENING HISTORY:  Health Maintenance         Date Due Completion Date    RSV Vaccine (Age 60+ and Pregnant patients) (1 - Risk 60-74 years 1-dose series) Never done ---    TETANUS VACCINE 07/15/2018 7/15/2008    Mammogram 01/18/2025 1/18/2024    Colorectal Cancer Screening 06/12/2029 6/12/2024    Lipid Panel 07/12/2029 7/12/2024    DEXA Scan 01/14/2030 1/14/2020              REVIEW OF SYSTEMS:   The patient reports : fair dietary habits.  The patient reports  : that they do not exercise regularly  Review of Systems   Constitutional:  Negative for chills and fever.   Respiratory:  Positive for cough. Negative for shortness of breath.    Cardiovascular:  Negative for chest pain.   Gastrointestinal:  Negative for abdominal pain.   Genitourinary:  Negative for dysuria.       ROS (Optional)-: no pelvic pain  Breast ROS (Optional)-: negative for breast  lumps/discharge          Objective    PHYSICAL EXAMINATION/VITALS:  Vitals:    01/15/25 0804   BP: 122/66   Pulse: 69   Temp: 98.2 °F (36.8 °C)   TempSrc: Oral   SpO2: 95%   Weight: 105.6 kg (232 lb 11.1 oz)   Height: 5' (1.524 m)        Body mass index is 45.45 kg/m².      General appearance - alert, well appearing, and in no distress, morbidly obese  Psychiatric - alert, oriented to person, place, and time, normal behavior, speech, dress, motor activity and thought process  Eyes - pupils equal and reactive, extraocular eye movements intact, sclera anicteric  Neck - supple, no significant adenopathy, carotids upstroke normal bilaterally, no bruits  Lymphatics - no palpable cervical lymphadenopathy  Chest - clear to auscultation, no wheezes, rales or rhonchi, symmetric air entry  Heart - normal rate and regular rhythm  Neurological - alert, normal speech, no focal findings; cranial nerves II through XII intact  Musculoskeletal - not examined, normal gait  Extremities - no pedal edema noted  Skin - normal coloration, no suspicious skin lesions      LABS:  CMP  Sodium   Date Value Ref Range Status   01/13/2025 137 136 - 145 mmol/L Final     Potassium   Date Value Ref Range Status   01/13/2025 3.9 3.5 - 5.1 mmol/L Final     Chloride   Date Value Ref Range Status   01/13/2025 104 95 - 110 mmol/L Final     CO2   Date Value Ref Range Status   01/13/2025 24 23 - 29 mmol/L Final     Glucose   Date Value Ref Range Status   01/13/2025 87 70 - 110 mg/dL Final     BUN   Date Value Ref Range Status   01/13/2025 21 8 - 23 mg/dL Final     Creatinine   Date Value Ref Range Status   01/13/2025 0.9 0.5 - 1.4 mg/dL Final     Calcium   Date Value Ref Range Status   01/13/2025 9.2 8.7 - 10.5 mg/dL Final     Total Protein   Date Value Ref Range Status   01/13/2025 7.6 6.0 - 8.4 g/dL Final     Albumin   Date Value Ref Range Status   01/13/2025 3.7 3.5 - 5.2 g/dL Final     Total Bilirubin   Date Value Ref Range Status   01/13/2025 0.6 0.1  - 1.0 mg/dL Final     Comment:     For infants and newborns, interpretation of results should be based  on gestational age, weight and in agreement with clinical  observations.    Premature Infant recommended reference ranges:  Up to 24 hours.............<8.0 mg/dL  Up to 48 hours............<12.0 mg/dL  3-5 days..................<15.0 mg/dL  6-29 days.................<15.0 mg/dL       Alkaline Phosphatase   Date Value Ref Range Status   01/13/2025 98 40 - 150 U/L Final     AST   Date Value Ref Range Status   01/13/2025 20 10 - 40 U/L Final     ALT   Date Value Ref Range Status   01/13/2025 26 10 - 44 U/L Final     Anion Gap   Date Value Ref Range Status   01/13/2025 9 8 - 16 mmol/L Final     eGFR   Date Value Ref Range Status   01/13/2025 >60 >60 mL/min/1.73 m^2 Final     Lab Results   Component Value Date    WBC 5.35 01/03/2025    HGB 12.6 01/03/2025    HCT 38.3 01/03/2025    MCV 95 01/03/2025     01/03/2025                   ASSESSMENT AND PLAN:   1. Routine medical exam  Counseled on age appropriate medical preventative services including age appropriate cancer screenings, age appropriate eye and dental exams, over all nutritional health, need for a consistent exercise regimen, and an over all push towards maintaining a vigorous and active lifestyle.  Counseled on age appropriate vaccines and discussed upcoming health care needs based on age/gender. Discussed good sleep hygiene and stress management.    2. Essential hypertension  BP Readings from Last 1 Encounters:   01/15/25 122/66      The current medical regimen is effective;  continue present plan and medications. Recommended patient to check home readings to monitor and see me for followup as scheduled or sooner as needed.   Discussed sodium restriction, maintaining ideal body weight and regular exercise program as physiologic means to continue to achieve blood pressure control in addition to medication compliance.  Patient was educated that both  decongestant and anti-inflammatory medication may raise blood pressure.  The patient is not active on the digital hypertension program.  -     triamterene-hydrochlorothiazide 37.5-25 mg (MAXZIDE-25) 37.5-25 mg per tablet; Take HALF a tablet by mouth once daily.  Dispense: 45 tablet; Refill: 2    3. Mixed hyperlipidemia/6. 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 Patient has statin myopathy.  I evaluated and reviewed with the patient their cardiovascular risk:  The 10-year ASCVD risk score (Chuckie FARNSWORTH, et al., 2019) is: 12.9%    Values used to calculate the score:      Age: 70 years      Sex: Female      Is Non- : No      Diabetic: No      Tobacco smoker: No      Systolic Blood Pressure: 122 mmHg      Is BP treated: Yes      HDL Cholesterol: 56 mg/dL      Total Cholesterol: 210 mg/dL  She will resume baby aspirin and krill oil after her surgery. Followed by cardiology prn.  Overview:  Diet-controlled  - could not tolerate lipitor and zetia      4. Seronegative rheumatoid arthritis  Mostly asymptomatic. Not followed by rheumatology at this time. Not on DMARD.    5. Primary osteoarthritis of both knees  The current medical regimen is effective;  continue present plan and medications.   Overview:  - improvement in right knee pain with cortisone shot        7. BELLA (obstructive sleep apnea)  Will follow up with sleep medicine at her convenience to address tx.  Overview:  Pt will need new home sleep study in order to qualify for cpap again. Pt states can't afford at moment. She will contact us back when she has funding. In the meantime, she is  working on diet, exercise and weight loss.         8. Morbid obesity with BMI of 45.0-49.9, adult  BMI Readings from Last 3 Encounters:   01/15/25 45.45 kg/m²   01/13/25 45.40 kg/m²   01/13/25 45.55 kg/m²     The patient is asked to make an attempt to improve diet and exercise patterns to aid in medical management of this problem.    9. Postmenopausal bleeding  Scheduled for hysterectomy 1/21/25.  Overview:  - negative workup with GYN in 2015 (negative U/S and bx) - consider hysterectomy if recurs      10. Need for prophylactic vaccination and inoculation against respiratory syncytial virus (RSV)  Patient was advised to get immunization at the pharmacy.    11. Screening mammogram, encounter for  Will schedule at her convenience after her surgery.  -     Mammo Digital Screening Bilat w/ Abundio; Future; Expected date: 01/15/2025    12. Upper respiratory tract infection, unspecified type  She is feeling better. Observe.    13. Allergic rhinitis, unspecified seasonality, unspecified trigger  The current medical regimen is effective;  continue present plan and medications.   -     fluticasone propionate (FLONASE) 50 mcg/actuation nasal spray; SPRAY 2 SPRAYs INTO EACH NOSTRIL ONCE EVERY DAY  Dispense: 48 mL; Refill: 2     Reassured patient that the mild cardiomegaly noted on chest x-ray is likely due to under inflation of her lungs at that time.  She had an echo done about a year ago that did not show any significant cardiac enlargement.          PATIENT EDUCATION:  Explained chest XR findings, clarifying that it is not the best method to evaluate heart size and that lung inflation can affect heart silhouette appearance  Discussed normal age-related findings such as aortic plaque accumulation and spinal arthritis  Provided information on RSV vaccine, including eligibility criteria and lack of annual boosters  Discussed risks associated with weight loss shots, including muscle mass loss and GI side effects    ACTION  ITEMS/LIFESTYLE:  Patient to get RSV vaccine at convenience after surgery          Orders Placed This Encounter   Procedures    Mammo Digital Screening Bilat w/ Abundio      FOLLOW UP: Follow up in about 1 year (around 1/15/2026), or if symptoms worsen or fail to improve, for annual exam. or sooner as needed.    This note was generated with the assistance of ambient listening technology. Verbal consent was obtained by the patient and accompanying visitor(s) for the recording of patient appointment to facilitate this note. I attest to having reviewed and edited the generated note for accuracy, though some syntax or spelling errors may persist. Please contact the author of this note for any clarification.

## 2025-01-17 ENCOUNTER — TELEPHONE (OUTPATIENT)
Dept: SURGERY | Facility: HOSPITAL | Age: 70
End: 2025-01-17
Payer: COMMERCIAL

## 2025-01-30 ENCOUNTER — TELEPHONE (OUTPATIENT)
Dept: FAMILY MEDICINE | Facility: CLINIC | Age: 70
End: 2025-01-30
Payer: COMMERCIAL

## 2025-01-30 DIAGNOSIS — N64.4 BREAST PAIN: Primary | ICD-10-CM

## 2025-01-30 NOTE — TELEPHONE ENCOUNTER
----- Message from Mckenna sent at 1/30/2025 11:18 AM CST -----  Type:  Needs Medical Advice    Who Called: ALMA ALVAREZ [[id number]]    Symptoms (please be specific): Pain in the breast      How long has patient had these symptoms:  about 6 weeks     Would the patient rather a call back or a response via [facility]? Call back     Best Call Back Number: [phone]     Additional Information: Patient states she is experiencing pain in the breast. The order for the mammogram was denied due to the pain. Patient is requesting a diagnostic mammogram if possible. Patient would like a call back with further assistance and information.

## 2025-02-04 ENCOUNTER — TELEPHONE (OUTPATIENT)
Dept: GYNECOLOGIC ONCOLOGY | Facility: CLINIC | Age: 70
End: 2025-02-04
Payer: COMMERCIAL

## 2025-02-04 ENCOUNTER — TELEPHONE (OUTPATIENT)
Dept: OBSTETRICS AND GYNECOLOGY | Facility: CLINIC | Age: 70
End: 2025-02-04
Payer: COMMERCIAL

## 2025-02-04 DIAGNOSIS — N85.01 COMPLEX ENDOMETRIAL HYPERPLASIA WITHOUT ATYPIA: Primary | ICD-10-CM

## 2025-02-05 ENCOUNTER — HOSPITAL ENCOUNTER (OUTPATIENT)
Dept: RADIOLOGY | Facility: HOSPITAL | Age: 70
Discharge: HOME | End: 2025-02-05
Attending: INTERNAL MEDICINE
Payer: COMMERCIAL

## 2025-02-05 VITALS — BODY MASS INDEX: 45.55 KG/M2 | HEIGHT: 60 IN | WEIGHT: 232 LBS

## 2025-02-05 DIAGNOSIS — N64.4 BREAST PAIN: ICD-10-CM

## 2025-02-05 PROCEDURE — 77066 DX MAMMO INCL CAD BI: CPT | Mod: 26,,, | Performed by: RADIOLOGY

## 2025-02-05 PROCEDURE — 77066 DX MAMMO INCL CAD BI: CPT | Mod: TC

## 2025-02-05 PROCEDURE — 77062 BREAST TOMOSYNTHESIS BI: CPT | Mod: 26,,, | Performed by: RADIOLOGY

## 2025-02-05 NOTE — PROGRESS NOTES
REFERRING PROVIDER  Ladan Maya MD     HISTORY OF PRESENT CONDITION  CC: benign hyperplasia  Apoorva Fernandez is a 70 y.o.  who presents in consultation at for an opinion regarding complex hyperplasia without atypia.    REVIEW OF SYSTEMS  All systems reviewed and negative except as noted in HPI.    OBJECTIVE   Vitals:    25 1400   BP: (!) 157/78   Pulse: 67   Temp: 97.8 °F (36.6 °C)      Body mass index is 45.47 kg/m².      1. General: Well appearing, no apparent distress, alert and oriented.  2. Lymph: Neck symmetric without cervical or supraclavicular adenopathy or mass.  3. Lungs: Normal respiratory rate, no accessory muscle use.  4. Cardiac: Normal rate  5. Psych: Normal affect.  6. Abdomen:  non-distended, soft, non-tender, are no masses, no ascites, no hepatosplenomegaly.  7. Skin: Warm, dry, no rashes or lesions.   8. Extremities: Bilateral lower extremities without edema or tenderness.  9. Genitourinary: Deferred     ECOG status: 0    LABORATORY DATA  Lab data reviewed.    RADIOLOGICAL DATA  Radiology data reviewed.    PATHOLOGY DATA  Pathology data reviewed.    ASSESSMENT / PLAN     1. Complex endometrial hyperplasia without atypia    2. Morbid obesity with BMI of 45.0-49.9, adult    3. Personal history of peptic ulcer disease    4. Essential hypertension    5. Mixed hyperlipidemia    6. Cardiac ischemia         F/U Cardiology clearance (Dr. Juancarlos Trotter)    24: Nuclear Stress Test: + for ischemia, EF = 64%  24: CT Cardiac Score: ?  24: Pelvic US: 9 cm uterus, EMT = 4.6 mm  24: EMBX: complex hyperplasia without atypia  1/3/25: Hb 12.6, Plt 239  25: EKG: NSR, RBBB    I discussed with the patient that the primary treatment for benign hyperplasia is surgery. This includes a total hysterectomy and bilateral salpingo-oophorectomy without sentinel lymph node biopsies. I will plan to perform this in a laparoscopic fashion using the robot. The procedure and its risks  and benefits were discussed in detail.  Given her co morbidities and past surgical history, we will abort if there is significant adhesive disease and the procedure cannot be completed in a minimally invasive fashion.  We will then plan for an IUD insertion and repeat EMBX in 6 months.  At this time she is undecided, and we'll revisit virtually in 2 weeks to finalize a plan.    I explained that obesity is a major risk factor for low grade (grade 1-2) endometrial cancer/hyperplasia. I told the patient that in my practice I routinely refer all patients with low-grade, low-stage endometrial cancer or hyperplasia with a BMI greater than or equal to 30 kg/m2 for weight loss management. I offered a referral for her to be seen by a specialist.     *No NSAIDs given h/o PUD      PATIENT EDUCATION  Ready to learn, no apparent learning barriers were identified; learning preferences include listening.  Explained diagnosis and treatment plan; patient expressed understanding of the content.    INFORMED CONSENT  Discussed the risks, benefits, and alternatives of the procedure and of possible blood transfusion.  Discussed the necessity of other members of the healthcare team participating in the procedure.  All questions answered and consent given.    Steffen Harris

## 2025-02-06 ENCOUNTER — PATIENT MESSAGE (OUTPATIENT)
Dept: FAMILY MEDICINE | Facility: CLINIC | Age: 70
End: 2025-02-06
Payer: COMMERCIAL

## 2025-02-11 ENCOUNTER — OFFICE VISIT (OUTPATIENT)
Dept: GYNECOLOGIC ONCOLOGY | Facility: CLINIC | Age: 70
End: 2025-02-11
Payer: COMMERCIAL

## 2025-02-11 ENCOUNTER — TELEPHONE (OUTPATIENT)
Dept: GYNECOLOGIC ONCOLOGY | Facility: CLINIC | Age: 70
End: 2025-02-11

## 2025-02-11 VITALS
SYSTOLIC BLOOD PRESSURE: 157 MMHG | TEMPERATURE: 98 F | WEIGHT: 232.81 LBS | HEART RATE: 67 BPM | HEIGHT: 60 IN | BODY MASS INDEX: 45.71 KG/M2 | DIASTOLIC BLOOD PRESSURE: 78 MMHG | OXYGEN SATURATION: 97 %

## 2025-02-11 DIAGNOSIS — Z87.11 PERSONAL HISTORY OF PEPTIC ULCER DISEASE: ICD-10-CM

## 2025-02-11 DIAGNOSIS — N85.01 COMPLEX ENDOMETRIAL HYPERPLASIA WITHOUT ATYPIA: Primary | ICD-10-CM

## 2025-02-11 DIAGNOSIS — E66.01 MORBID OBESITY WITH BMI OF 45.0-49.9, ADULT: ICD-10-CM

## 2025-02-11 DIAGNOSIS — I10 ESSENTIAL HYPERTENSION: ICD-10-CM

## 2025-02-11 DIAGNOSIS — I25.9 CARDIAC ISCHEMIA: ICD-10-CM

## 2025-02-11 DIAGNOSIS — E78.2 MIXED HYPERLIPIDEMIA: ICD-10-CM

## 2025-02-11 PROCEDURE — 99999 PR PBB SHADOW E&M-EST. PATIENT-LVL IV: CPT | Mod: PBBFAC,,, | Performed by: OBSTETRICS & GYNECOLOGY

## 2025-02-25 ENCOUNTER — OFFICE VISIT (OUTPATIENT)
Dept: GYNECOLOGIC ONCOLOGY | Facility: CLINIC | Age: 70
End: 2025-02-25
Payer: COMMERCIAL

## 2025-02-25 DIAGNOSIS — I25.9 CARDIAC ISCHEMIA: ICD-10-CM

## 2025-02-25 DIAGNOSIS — Z87.11 PERSONAL HISTORY OF PEPTIC ULCER DISEASE: ICD-10-CM

## 2025-02-25 DIAGNOSIS — E78.2 MIXED HYPERLIPIDEMIA: ICD-10-CM

## 2025-02-25 DIAGNOSIS — E66.01 MORBID OBESITY WITH BMI OF 45.0-49.9, ADULT: ICD-10-CM

## 2025-02-25 DIAGNOSIS — I10 ESSENTIAL HYPERTENSION: ICD-10-CM

## 2025-02-25 DIAGNOSIS — N85.01 COMPLEX ENDOMETRIAL HYPERPLASIA WITHOUT ATYPIA: Primary | ICD-10-CM

## 2025-02-25 NOTE — PROGRESS NOTES
The patient location is: home  The chief complaint leading to consultation is: benign hyperplasia    Visit type: audiovisual    Face to Face time with patient: 15  30 minutes of total time spent on the encounter, which includes face to face time and non-face to face time preparing to see the patient (eg, review of tests), Obtaining and/or reviewing separately obtained history, Documenting clinical information in the electronic or other health record, Independently interpreting results (not separately reported) and communicating results to the patient/family/caregiver, or Care coordination (not separately reported).         Each patient to whom he or she provides medical services by telemedicine is:  (1) informed of the relationship between the physician and patient and the respective role of any other health care provider with respect to management of the patient; and (2) notified that he or she may decline to receive medical services by telemedicine and may withdraw from such care at any time.    Notes:      HISTORY OF PRESENT CONDITION  CC: benign hyperplasia  Apoorva Fernandez is a 70 y.o.  who presents in consultation at for an opinion regarding complex hyperplasia without atypia.    REVIEW OF SYSTEMS  All systems reviewed and negative except as noted in HPI.    OBJECTIVE   There were no vitals filed for this visit.     There is no height or weight on file to calculate BMI.      1. General: Well appearing, no apparent distress, alert and oriented.  2. Lymph: Neck symmetric without cervical or supraclavicular adenopathy or mass.  3. Lungs: Normal respiratory rate, no accessory muscle use.  4. Cardiac: Normal rate  5. Psych: Normal affect.  6. Abdomen:  non-distended, soft, non-tender, are no masses, no ascites, no hepatosplenomegaly.  7. Skin: Warm, dry, no rashes or lesions.   8. Extremities: Bilateral lower extremities without edema or tenderness.  9. Genitourinary: Deferred     ECOG status:  0    LABORATORY DATA  Lab data reviewed.    RADIOLOGICAL DATA  Radiology data reviewed.    PATHOLOGY DATA  Pathology data reviewed.    ASSESSMENT / PLAN     1. Complex endometrial hyperplasia without atypia    2. Morbid obesity with BMI of 45.0-49.9, adult    3. Personal history of peptic ulcer disease    4. Essential hypertension    5. Mixed hyperlipidemia    6. Cardiac ischemia         F/U Cardiology clearance (Dr. Juancarlos Trotter)    1/22/24: Nuclear Stress Test: + for ischemia, EF = 64%  7/17/24: CT Cardiac Score: ?  9/30/24: Pelvic US: 9 cm uterus, EMT = 4.6 mm  11/11/24: EMBX: complex hyperplasia without atypia  1/3/25: Hb 12.6, Plt 239  1/13/25: EKG: NSR, RBBB    The patient is still concerned about proceeding with robotic, laparoscopic, or open surgery due to her medical problems and surgical history.  We discussed the fact we commonly perform surgeries on obese patients with medical problems.  We also discussed that it's her past surgical history that is most likely to be the most prohibitive factor in a minimally invasive surgery.  She is interested in a vaginal hysterectomy, and I discussed with her that I no longer perform them.  We again discussed medical options, including an IUD and PO.  She will discuss further with her  and message us with a final decision.  I spoke to Dr. Maya who also does not perform vaginal surgery.  If she is interested in vaginal surgery, we will place a referral to Urogyn, and if she is interested in medical management, we will proceed with a D&C and IUD insertion.      *No NSAIDs given h/o PUD      PATIENT EDUCATION  Ready to learn, no apparent learning barriers were identified; learning preferences include listening.  Explained diagnosis and treatment plan; patient expressed understanding of the content.    INFORMED CONSENT  Discussed the risks, benefits, and alternatives of the procedure and of possible blood transfusion.  Discussed the necessity of other members  of the healthcare team participating in the procedure.  All questions answered and consent given.    Steffen Harris

## 2025-03-28 ENCOUNTER — OFFICE VISIT (OUTPATIENT)
Dept: URGENT CARE | Facility: CLINIC | Age: 70
End: 2025-03-28
Payer: COMMERCIAL

## 2025-03-28 VITALS
HEIGHT: 60 IN | OXYGEN SATURATION: 99 % | HEART RATE: 77 BPM | WEIGHT: 232 LBS | TEMPERATURE: 98 F | SYSTOLIC BLOOD PRESSURE: 144 MMHG | BODY MASS INDEX: 45.55 KG/M2 | RESPIRATION RATE: 18 BRPM | DIASTOLIC BLOOD PRESSURE: 69 MMHG

## 2025-03-28 DIAGNOSIS — M54.9 BACK PAIN, UNSPECIFIED BACK LOCATION, UNSPECIFIED BACK PAIN LATERALITY, UNSPECIFIED CHRONICITY: Primary | ICD-10-CM

## 2025-03-28 DIAGNOSIS — R10.32 LEFT LOWER QUADRANT ABDOMINAL PAIN: ICD-10-CM

## 2025-03-28 DIAGNOSIS — R30.0 DYSURIA: ICD-10-CM

## 2025-03-28 DIAGNOSIS — N23 RENAL COLIC ON LEFT SIDE: ICD-10-CM

## 2025-03-28 DIAGNOSIS — N20.0 KIDNEY STONES: ICD-10-CM

## 2025-03-28 LAB
BILIRUBIN, UA POC OHS: ABNORMAL
BLOOD, UA POC OHS: ABNORMAL
CLARITY, UA POC OHS: CLEAR
COLOR, UA POC OHS: YELLOW
GLUCOSE, UA POC OHS: NEGATIVE
KETONES, UA POC OHS: ABNORMAL
LEUKOCYTES, UA POC OHS: NEGATIVE
NITRITE, UA POC OHS: NEGATIVE
PH, UA POC OHS: 6
PROTEIN, UA POC OHS: ABNORMAL
SPECIFIC GRAVITY, UA POC OHS: 1.01
UROBILINOGEN, UA POC OHS: 2

## 2025-03-28 RX ORDER — NITROFURANTOIN 25; 75 MG/1; MG/1
100 CAPSULE ORAL 2 TIMES DAILY
Qty: 14 CAPSULE | Refills: 0 | Status: SHIPPED | OUTPATIENT
Start: 2025-03-28 | End: 2025-04-04

## 2025-03-28 RX ORDER — TAMSULOSIN HYDROCHLORIDE 0.4 MG/1
0.4 CAPSULE ORAL DAILY
Qty: 30 CAPSULE | Refills: 0 | Status: SHIPPED | OUTPATIENT
Start: 2025-03-28 | End: 2025-04-27

## 2025-03-28 NOTE — PROGRESS NOTES
Subjective:      Patient ID: Apoorva Fernandez is a 70 y.o. female.    Vitals:  height is 5' (1.524 m) and weight is 105.2 kg (232 lb). Her oral temperature is 98 °F (36.7 °C). Her blood pressure is 144/69 (abnormal) and her pulse is 77. Her respiration is 18 and oxygen saturation is 99%.     Chief Complaint: Abdominal Pain    Pt states she has had LLQ abdominal pain that radiates from left back. Also with frequent urination since Tuesday. Pt has took no OTC meds for this . Pt says she saw blood and black specks in her urine. Takes a lot of Tums and gaviscon.     Abdominal Pain  This is a new problem. The current episode started in the past 7 days. The onset quality is sudden. The problem occurs constantly. The most recent episode lasted 3 days. The problem has been unchanged. The pain is located in the left flank and generalized abdominal region. The pain is at a severity of 8/10. The pain is moderate. The quality of the pain is aching, sharp and tearing. Associated symptoms include frequency. She has tried nothing for the symptoms.       Gastrointestinal:  Positive for abdominal pain.   Genitourinary:  Positive for frequency.      Objective:     Physical Exam   Constitutional: She is oriented to person, place, and time.   HENT:   Head: Normocephalic.   Ears:   Right Ear: External ear normal.   Left Ear: External ear normal.   Nose: Nose normal.   Mouth/Throat: Mucous membranes are moist.   Eyes: Conjunctivae are normal. Right eye exhibits hordeolum.       Cardiovascular: Normal rate.   Pulmonary/Chest: Effort normal.   Abdominal: There is abdominal tenderness. There is no rebound and no guarding.   Musculoskeletal: Normal range of motion.         General: Normal range of motion.   Neurological: She is alert and oriented to person, place, and time.   Skin: Skin is dry.   Psychiatric: Her behavior is normal.     Results for orders placed or performed in visit on 03/28/25   POCT Urinalysis(Instrument)    Collection  Time: 03/28/25  9:03 AM   Result Value Ref Range    Color, POC UA Yellow Yellow, Straw, Colorless    Clarity, POC UA Clear Clear    Glucose, POC UA Negative Negative    Bilirubin, POC UA Small (A) Negative    Ketones, POC UA Trace (A) Negative    Spec Grav POC UA 1.015 1.005 - 1.030    Blood, POC UA Trace-intact (A) Negative    pH, POC UA 6.0 5.0 - 8.0    Protein, POC UA Trace (A) Negative    Urobilinogen, POC UA 2.0 (A) <=1.0    Nitrite, POC UA Negative Negative    WBC, POC UA Negative Negative       Assessment:     1. Back pain, unspecified back location, unspecified back pain laterality, unspecified chronicity    2. Renal colic on left side    3. Kidney stones    4. Left lower quadrant abdominal pain    5. Dysuria        Plan:       Back pain, unspecified back location, unspecified back pain laterality, unspecified chronicity  -     Cancel: POCT Influenza A/B MOLECULAR  -     POCT Urinalysis(Instrument)    Renal colic on left side  -     tamsulosin (FLOMAX) 0.4 mg Cap; Take 1 capsule (0.4 mg total) by mouth once daily.  Dispense: 30 capsule; Refill: 0  -     Ambulatory referral/consult to Urology  -     CT Abdomen Pelvis  Without Contrast; Future; Expected date: 03/28/2025    Kidney stones  -     tamsulosin (FLOMAX) 0.4 mg Cap; Take 1 capsule (0.4 mg total) by mouth once daily.  Dispense: 30 capsule; Refill: 0  -     Ambulatory referral/consult to Urology  -     CT Abdomen Pelvis  Without Contrast; Future; Expected date: 03/28/2025    Left lower quadrant abdominal pain  -     CT Abdomen Pelvis  Without Contrast; Future; Expected date: 03/28/2025    Dysuria  -     POCT Urinalysis(Instrument)  -     nitrofurantoin, macrocrystal-monohydrate, (MACROBID) 100 MG capsule; Take 1 capsule (100 mg total) by mouth 2 (two) times daily. for 7 days  Dispense: 14 capsule; Refill: 0      Clinical picture consistent with renal colic. Will get imaging and send to urology. If any findings are critical she should be directed to her  nearest ED.

## 2025-03-29 NOTE — PROGRESS NOTES
"  Subjective:       Apoorva Fernandez is a 70 y.o. female who is a new patient who was referred by Dr. Chantel Rabago  for evaluation of "kidney stones and bladder health".      She was recently seen in urgent care with c/o L groin pain that radiated to back. This is associated with increased urinary frequency. CT scan ordered but has not been completed. No other recent upper tract imaging. CT 2018 without stones. She was given Flomax and Macrobid from urgent care. She notes "black grit" in urine. No prior nephrolithiasis. Son with h/o stones.    UA trace blood and bilirubin (POCT only). No urine culture sent. Pain occurs still but less frequently and less severe.     Father with bladder cancer, .     Notes R LE swelling, prior neg DVT workup by report.       The following portions of the patient's history were reviewed and updated as appropriate: allergies, current medications, past family history, past medical history, past social history, past surgical history and problem list.    Review of Systems  12 point review of systems completed. Pertinent positive and negatives listed in HPI        Objective:    Vitals: Wt 104.1 kg (229 lb 6.2 oz)   LMP 2015 (Exact Date)   BMI 44.80 kg/m²     Physical Exam   General: well developed, well nourished in no acute distress  Head: normocephalic, atraumatic  Neck: no obvious enlargement of thyroid  HEENT: EOMI, mucus membranes moist, sclera anicteric, no hearing impairment  Lungs: symmetric expansion, non-labored breathing  Neuro: alert and oriented x 3, no gross deficits  Psych: normal judgment and insight, normal mood/affect and non-anxious  Genitourinary: deferred      Lab Review   Urine analysis today in clinic shows negative for all components     Lab Results   Component Value Date    WBC 5.35 2025    HGB 12.6 2025    HCT 38.3 2025    MCV 95 2025     2025     Lab Results   Component Value Date    CREATININE 0.9 2025 "    BUN 21 01/13/2025         Imaging  Images and reports were personally reviewed by me and discussed with patient  CT 2018       Assessment/Plan:      1. Left flank pain    - Suspected renal colic   - CT RSS now   - Okay to hold Flomax due to dizziness, will restart PRN CT results   - Strain urine     2. Microhematuria    - Possible blood on POCT in ER   - UA clear today   - No recent UA micro         Follow up in 1-2 weeks with CT

## 2025-03-31 ENCOUNTER — OFFICE VISIT (OUTPATIENT)
Dept: UROLOGY | Facility: CLINIC | Age: 70
End: 2025-03-31
Payer: COMMERCIAL

## 2025-03-31 ENCOUNTER — HOSPITAL ENCOUNTER (OUTPATIENT)
Dept: RADIOLOGY | Facility: HOSPITAL | Age: 70
Discharge: HOME | End: 2025-03-31
Attending: UROLOGY
Payer: COMMERCIAL

## 2025-03-31 VITALS — WEIGHT: 229.38 LBS | BODY MASS INDEX: 44.8 KG/M2

## 2025-03-31 DIAGNOSIS — R31.29 MICROHEMATURIA: ICD-10-CM

## 2025-03-31 DIAGNOSIS — R10.9 LEFT FLANK PAIN: ICD-10-CM

## 2025-03-31 DIAGNOSIS — R10.9 LEFT FLANK PAIN: Primary | ICD-10-CM

## 2025-03-31 PROCEDURE — 74176 CT ABD & PELVIS W/O CONTRAST: CPT | Mod: 26,,, | Performed by: RADIOLOGY

## 2025-03-31 PROCEDURE — 3008F BODY MASS INDEX DOCD: CPT | Mod: CPTII,S$GLB,, | Performed by: UROLOGY

## 2025-03-31 PROCEDURE — 99204 OFFICE O/P NEW MOD 45 MIN: CPT | Mod: S$GLB,,, | Performed by: UROLOGY

## 2025-03-31 PROCEDURE — 74176 CT ABD & PELVIS W/O CONTRAST: CPT | Mod: TC

## 2025-03-31 PROCEDURE — 99999 PR PBB SHADOW E&M-EST. PATIENT-LVL III: CPT | Mod: PBBFAC,,, | Performed by: UROLOGY

## 2025-03-31 PROCEDURE — 1101F PT FALLS ASSESS-DOCD LE1/YR: CPT | Mod: CPTII,S$GLB,, | Performed by: UROLOGY

## 2025-03-31 PROCEDURE — 3288F FALL RISK ASSESSMENT DOCD: CPT | Mod: CPTII,S$GLB,, | Performed by: UROLOGY

## 2025-03-31 PROCEDURE — 1160F RVW MEDS BY RX/DR IN RCRD: CPT | Mod: CPTII,S$GLB,, | Performed by: UROLOGY

## 2025-03-31 PROCEDURE — 1125F AMNT PAIN NOTED PAIN PRSNT: CPT | Mod: CPTII,S$GLB,, | Performed by: UROLOGY

## 2025-03-31 PROCEDURE — 1159F MED LIST DOCD IN RCRD: CPT | Mod: CPTII,S$GLB,, | Performed by: UROLOGY

## 2025-04-01 ENCOUNTER — HOSPITAL ENCOUNTER (EMERGENCY)
Facility: HOSPITAL | Age: 70
Discharge: HOME | End: 2025-04-01
Attending: EMERGENCY MEDICINE
Payer: MEDICARE

## 2025-04-01 ENCOUNTER — RESULTS FOLLOW-UP (OUTPATIENT)
Dept: UROLOGY | Facility: CLINIC | Age: 70
End: 2025-04-01

## 2025-04-01 VITALS
BODY MASS INDEX: 44.96 KG/M2 | WEIGHT: 229 LBS | HEIGHT: 60 IN | HEART RATE: 71 BPM | TEMPERATURE: 98 F | OXYGEN SATURATION: 99 % | SYSTOLIC BLOOD PRESSURE: 140 MMHG | RESPIRATION RATE: 18 BRPM | DIASTOLIC BLOOD PRESSURE: 60 MMHG

## 2025-04-01 DIAGNOSIS — K57.32 SIGMOID DIVERTICULITIS: Primary | ICD-10-CM

## 2025-04-01 LAB
ABSOLUTE EOSINOPHIL (OHS): 0.06 K/UL
ABSOLUTE MONOCYTE (OHS): 0.51 K/UL (ref 0.3–1)
ABSOLUTE NEUTROPHIL COUNT (OHS): 4.73 K/UL (ref 1.8–7.7)
ALBUMIN SERPL BCP-MCNC: 3.2 G/DL (ref 3.5–5.2)
ALP SERPL-CCNC: 116 UNIT/L (ref 40–150)
ALT SERPL W/O P-5'-P-CCNC: 31 UNIT/L (ref 10–44)
ANION GAP (OHS): 9 MMOL/L (ref 8–16)
AST SERPL-CCNC: 20 UNIT/L (ref 11–45)
BASOPHILS # BLD AUTO: 0.01 K/UL
BASOPHILS NFR BLD AUTO: 0.2 %
BILIRUB SERPL-MCNC: 0.7 MG/DL (ref 0.1–1)
BILIRUB UR QL STRIP.AUTO: NEGATIVE
BUN SERPL-MCNC: 13 MG/DL (ref 8–23)
CALCIUM SERPL-MCNC: 8.6 MG/DL (ref 8.7–10.5)
CHLORIDE SERPL-SCNC: 106 MMOL/L (ref 95–110)
CLARITY UR: CLEAR
CO2 SERPL-SCNC: 21 MMOL/L (ref 23–29)
COLOR UR AUTO: YELLOW
CREAT SERPL-MCNC: 0.8 MG/DL (ref 0.5–1.4)
ERYTHROCYTE [DISTWIDTH] IN BLOOD BY AUTOMATED COUNT: 12.8 % (ref 11.5–14.5)
GFR SERPLBLD CREATININE-BSD FMLA CKD-EPI: >60 ML/MIN/1.73/M2
GLUCOSE SERPL-MCNC: 103 MG/DL (ref 70–110)
GLUCOSE UR QL STRIP: NEGATIVE
HCT VFR BLD AUTO: 38.4 % (ref 37–48.5)
HGB BLD-MCNC: 13 GM/DL (ref 12–16)
HGB UR QL STRIP: NEGATIVE
IMM GRANULOCYTES # BLD AUTO: 0.02 K/UL (ref 0–0.04)
IMM GRANULOCYTES NFR BLD AUTO: 0.3 % (ref 0–0.5)
KETONES UR QL STRIP: NEGATIVE
LEUKOCYTE ESTERASE UR QL STRIP: NEGATIVE
LIPASE SERPL-CCNC: 24 U/L (ref 4–60)
LYMPHOCYTES # BLD AUTO: 0.64 K/UL (ref 1–4.8)
MCH RBC QN AUTO: 31.7 PG (ref 27–31)
MCHC RBC AUTO-ENTMCNC: 33.9 G/DL (ref 32–36)
MCV RBC AUTO: 94 FL (ref 82–98)
NITRITE UR QL STRIP: NEGATIVE
NUCLEATED RBC (/100WBC) (OHS): 0 /100 WBC
PH UR STRIP: 6 [PH]
PLATELET # BLD AUTO: 272 K/UL (ref 150–450)
PMV BLD AUTO: 9.1 FL (ref 9.2–12.9)
POTASSIUM SERPL-SCNC: 3.7 MMOL/L (ref 3.5–5.1)
PROT SERPL-MCNC: 7.7 GM/DL (ref 6–8.4)
PROT UR QL STRIP: NEGATIVE
RBC # BLD AUTO: 4.1 M/UL (ref 4–5.4)
RELATIVE EOSINOPHIL (OHS): 1 %
RELATIVE LYMPHOCYTE (OHS): 10.7 % (ref 18–48)
RELATIVE MONOCYTE (OHS): 8.5 % (ref 4–15)
RELATIVE NEUTROPHIL (OHS): 79.3 % (ref 38–73)
SODIUM SERPL-SCNC: 136 MMOL/L (ref 136–145)
SP GR UR STRIP: 1.01
UROBILINOGEN UR STRIP-ACNC: NEGATIVE EU/DL
WBC # BLD AUTO: 5.97 K/UL (ref 3.9–12.7)

## 2025-04-01 PROCEDURE — 85025 COMPLETE CBC W/AUTO DIFF WBC: CPT

## 2025-04-01 PROCEDURE — 25000003 PHARM REV CODE 250

## 2025-04-01 PROCEDURE — 80053 COMPREHEN METABOLIC PANEL: CPT

## 2025-04-01 PROCEDURE — 83690 ASSAY OF LIPASE: CPT

## 2025-04-01 PROCEDURE — 81003 URINALYSIS AUTO W/O SCOPE: CPT

## 2025-04-01 PROCEDURE — 96374 THER/PROPH/DIAG INJ IV PUSH: CPT

## 2025-04-01 PROCEDURE — 63600175 PHARM REV CODE 636 W HCPCS: Mod: JZ,TB

## 2025-04-01 PROCEDURE — 99284 EMERGENCY DEPT VISIT MOD MDM: CPT | Mod: 25

## 2025-04-01 RX ORDER — SODIUM CHLORIDE 9 MG/ML
500 INJECTION, SOLUTION INTRAVENOUS
Status: COMPLETED | OUTPATIENT
Start: 2025-04-01 | End: 2025-04-01

## 2025-04-01 RX ORDER — KETOROLAC TROMETHAMINE 30 MG/ML
15 INJECTION, SOLUTION INTRAMUSCULAR; INTRAVENOUS
Status: COMPLETED | OUTPATIENT
Start: 2025-04-01 | End: 2025-04-01

## 2025-04-01 RX ORDER — AMOXICILLIN AND CLAVULANATE POTASSIUM 875; 125 MG/1; MG/1
1 TABLET, FILM COATED ORAL 2 TIMES DAILY
Qty: 28 TABLET | Refills: 0 | Status: SHIPPED | OUTPATIENT
Start: 2025-04-01 | End: 2025-04-15

## 2025-04-01 RX ADMIN — KETOROLAC TROMETHAMINE 15 MG: 30 INJECTION, SOLUTION INTRAMUSCULAR; INTRAVENOUS at 06:04

## 2025-04-01 RX ADMIN — SODIUM CHLORIDE 500 ML: 9 INJECTION, SOLUTION INTRAVENOUS at 06:04

## 2025-04-01 NOTE — ED PROVIDER NOTES
Encounter Date: 4/1/2025       History     Chief Complaint   Patient presents with    Abdominal Pain     69 yo fem to triage for LLQ abd pain and diarrhea. Pt  had a CT scan done and was diagnosed w/ diverticulitis. Instructed to come to ED for treatment. VSS, NAD, AAOx4    Diarrhea     Patient is a 70-year-old female with past medical history as listed in the chart who presents to the emergency department for evaluation of intermittent achy nonradiating left lower quadrant abdominal pain x 1 week.  Reports being seen urgent care where she was told she likely had a kidney stone.  She was started on Flomax, Macrobid.  She reports seeing Dr. Victoria yesterday, had a CT scan ordered which did not show a kidney stone but diverticulitis.  She was recommended to come to the ER for evaluation.  She reports 1 episode of diarrhea after starting Macrobid.  Denies blood in the stool.  Reports history of diverticulosis that was found on routine colonoscopy.  Denies history of diverticulitis.  Reports pain 5/10 currently.  No urinary symptoms.  No nausea or vomiting.  No chest pain or shortness of breath.  No other complaints.    The history is provided by the patient.     Review of patient's allergies indicates:   Allergen Reactions    Amlodipine Other (See Comments)     Chest pressure    Antihistamines - alkylamine Shortness Of Breath    Losartan Shortness Of Breath    Atorvastatin      Other reaction(s): myalgia  Other reaction(s): myalgia    Ezetimibe      Other reaction(s): muscle cramps  Other reaction(s): muscle cramps    Pravastatin Other (See Comments)      myalgia    Lactose intolerance (lactase) [lactase] Nausea Only     Past Medical History:   Diagnosis Date    Allergic rhinitis     Allergy     Arthritis     Joint pain     Keloid cicatrix     Obesity     Osteoarthritis     Personal history of colonic polyps 08/2010    Adenomatous    Personal history of peptic ulcer disease     PONV (postoperative nausea and vomiting)      Sleep apnea     Umbilical hernia      Past Surgical History:   Procedure Laterality Date    Abdominal/umbilical hernia repair      x2, removed small part of small bowel; mesh placed     SECTION, LOW TRANSVERSE      X2    CHOLECYSTECTOMY      COLONOSCOPY N/A 2019    Procedure: COLONOSCOPY;  Surgeon: Chandrika Ho MD;  Location: [facility] ENDO;  Service: Endoscopy;  Laterality: N/A;    COLONOSCOPY N/A 2024    Procedure: COLONOSCOPY;  Surgeon: Stephanie Matta MD;  Location: [facility] ENDO;  Service: Endoscopy;  Laterality: N/A;  Ref by WILLIAN Rangel, PEG, portal - PC  24- lvm and portal msg for pc. DBM  24- lvm for pc. DBM    EXPLORATORY LAPAROTOMY      ruptured peptic ulcer    TOTAL KNEE ARTHROPLASTY Right 08/15/2022    Procedure: ARTHROPLASTY, KNEE, TOTAL- JOAN;  Surgeon: Jass Willis MD;  Location: [facility] OR;  Service: Orthopedics;  Laterality: Right;  NAE MIL SALGADO [phone] TEXTED HIM @ 7:06AM  ON 27:06AM ON 2022  1ST BALWINDER DAVIS [phone] TEXTED HER @ 7:16AM ON 2022 REPLIED @ 7:18AM ON 2022  NOON START  SUPINE  RN PREOP 2022    T/S ON 2022       HAS MEDICAL TESHA     Family History   Problem Relation Name Age of Onset    Breast cancer Mother Jackie Camacho          at age 65    Cancer Father          Bladder    Glaucoma Father      Hypertension Father      Heart disease Father      Obesity Sister Suyapa     Heart disease Brother Pankaj     Sleep apnea Brother Reji     Diabetes Neg Hx      Melanoma Neg Hx       Social History[1]  Review of Systems   Constitutional:  Negative for chills and fever.   Respiratory:  Negative for shortness of breath.    Cardiovascular:  Negative for chest pain.   Gastrointestinal:  Positive for abdominal pain and diarrhea. Negative for blood in stool, nausea and vomiting.   Genitourinary:  Negative for dysuria, flank pain, frequency, hematuria, vaginal bleeding and vaginal discharge.   Musculoskeletal:   Negative for back pain, neck pain and neck stiffness.   Neurological:  Negative for dizziness, light-headedness, numbness and headaches.       Physical Exam     Initial Vitals [04/01/25 1804]   BP Pulse Resp Temp SpO2   (!) 143/69 67 17 97.9 °F (36.6 °C) 96 %      MAP       --         Physical Exam    Nursing note and vitals reviewed.  Constitutional: She appears well-developed and well-nourished.   HENT:   Head: Normocephalic and atraumatic.   Right Ear: External ear normal.   Left Ear: External ear normal.   Neck: Carotid bruit is not present.   Normal range of motion.  Cardiovascular:  Normal rate, regular rhythm, normal heart sounds and intact distal pulses.     Exam reveals no gallop and no friction rub.       No murmur heard.  Pulmonary/Chest: Breath sounds normal. No respiratory distress. She has no wheezes. She has no rhonchi. She has no rales.   Abdominal: Abdomen is soft. Bowel sounds are normal. She exhibits no distension. There is no abdominal tenderness.   No right CVA tenderness.  No left CVA tenderness. There is no rebound, no guarding, no tenderness at McBurney's point and negative Wilburn's sign. negative obturator sign, negative psoas sign and negative Rovsing's sign  Musculoskeletal:         General: Normal range of motion.      Cervical back: Normal range of motion.     Neurological: She is alert and oriented to person, place, and time. GCS score is 15. GCS eye subscore is 4. GCS verbal subscore is 5. GCS motor subscore is 6.   Psychiatric: She has a normal mood and affect.         ED Course   Procedures  Labs Reviewed   COMPREHENSIVE METABOLIC PANEL - Abnormal       Result Value    Sodium 136      Potassium 3.7      Chloride 106      CO2 21 (*)     Glucose 103      BUN 13      Creatinine 0.8      Calcium 8.6 (*)     Protein Total 7.7      Albumin 3.2 (*)     Bilirubin Total 0.7            AST 20      ALT 31      Anion Gap 9      eGFR >60     CBC WITH DIFFERENTIAL - Abnormal    WBC 5.97       RBC 4.10      HGB 13.0      HCT 38.4      MCV 94      MCH 31.7 (*)     MCHC 33.9      RDW 12.8      Platelet Count 272      MPV 9.1 (*)     Nucleated RBC 0      Neut % 79.3 (*)     Lymph % 10.7 (*)     Mono % 8.5      Eos % 1.0      Basophil % 0.2      Imm Grans % 0.3      Neut # 4.73      Lymph # 0.64 (*)     Mono # 0.51      Eos # 0.06      Baso # 0.01      Imm Grans # 0.02     LIPASE - Normal    Lipase Level 24     URINALYSIS, REFLEX TO URINE CULTURE - Normal    Color, UA Yellow      Appearance, UA Clear      pH, UA 6.0      Spec Grav UA 1.010      Protein, UA Negative      Glucose, UA Negative      Ketones, UA Negative      Bilirubin, UA Negative      Blood, UA Negative      Nitrites, UA Negative      Urobilinogen, UA Negative      Leukocyte Esterase, UA Negative     CBC W/ AUTO DIFFERENTIAL    Narrative:     The following orders were created for panel order CBC auto differential.  Procedure                               Abnormality         Status                     ---------                               -----------         ------                     CBC with Differential[[id number]]       Abnormal            Final result                 Please view results for these tests on the individual orders.          Imaging Results    None          Medications   0.9% NaCl infusion (500 mLs Intravenous New Bag 4/1/25 1831)   ketorolac injection 15 mg (15 mg Intravenous Given 4/1/25 1830)     Medical Decision Making  This is an emergent evaluation of a  70-year-old female with past medical history as listed in the chart who presents to the emergency department for evaluation of intermittent achy nonradiating left lower quadrant abdominal pain x 1 week. CT scan showing diverticulitis yesterday.    Physical exam as above.    Differential diagnosis includes but is not limited to diverticulitis, UTI, appendicitis, cholecystitis, GERD/gastritis, ureterolithiasis.    Workup initiated with basic labs, lipase, urinalysis.   Ordered fluids, Toradol.  No need for repeat imaging given CT yesterday showing acute uncomplicated diverticulitis.  There is no abdominal tenderness or guarding.  I do not suspect progression to perforation or abscess at this time.  None on CT scan yesterday.  Vital signs, chart, labs, and/or imaging were all reviewed.  See ED course below and interpretations above. My overall impression is sigmoid diverticulitis. Will discharge home with Augmentin with GI follow-up. Vital signs are reassuring. Patient/Caregiver is stable for discharge at this time.  Patient/Caregiver was informed of results, plan of care, and are comfortable with this.  All questions and concerns were addressed. Discussed strict return precautions with the patient/caregiver. Instructed follow up with primary care provider within 1 week.      Eric Lebron PA-C    DISCLAIMER: This note was prepared with [employer] voice recognition transcription software. Garbled syntax, mangled pronouns, and other bizarre constructions may be attributed to that software system.       Amount and/or Complexity of Data Reviewed  Labs: ordered. Decision-making details documented in ED Course.    Risk  Prescription drug management.               ED Course as of 04/01/25 1934 Tue Apr 01, 2025   1807 BP(!): 143/69 [TM]   1807 Temp: 97.9 °F (36.6 °C) [TM]   1807 Pulse: 67 [TM]   1807 Resp: 17 [TM]   1807 SpO2: 96 % [TM]   1807 CT from yesterday with Dr. Victoria 3/31/25    Impression:     Acute diverticulitis of the sigmoid colon with no convincing evidence of perforation or abscess.     Additional findings as above.     This report was flagged in Epic as abnormal.        Electronically signed by:Blade Bradley MD  Date:                                            04/01/2025  Time:                                           10:59   [TM]   1858 CBC auto differential(!)  CBC is without leukocytosis or anemia.  Normal platelets. [TM]   1858 Urinalysis, Reflex to Urine  Culture  No signs of infection [TM]   1925 Lipase  wnl [TM]   1925 Comprehensive metabolic panel(!)  CMP with normal renal function, no electrolyte derangement. [TM]   1934 Will discharge home 14 day course of Augmentin.  Instructed patient to follow liquid diet for the next 24 hours followed by low-fiber diet.  Instructed her to start high-fiber diet once finishing antibiotics to prevent recurrence.  Referral placed to GI.  Serial abdominal exam benign. [TM]      ED Course User Index  [TM] Eric Lebron PA-C                           Clinical Impression:  Final diagnoses:  [K57.32] Sigmoid diverticulitis (Primary)          ED Disposition Condition    Discharge Stable          ED Prescriptions       Medication Sig Dispense Start Date End Date Auth. Provider    amoxicillin-clavulanate 875-125mg (AUGMENTIN) 875-125 mg per tablet Take 1 tablet by mouth 2 (two) times daily. for 14 days 28 tablet 4/1/2025 4/15/2025 Eric Lebron PA-C          Follow-up Information       Follow up With Specialties Details Why Contact [facility] Emergency Dept Emergency Medicine Go to  As needed, If symptoms worsen, or new symptoms develop [address] [facility] [place] [address] [phone]    Primary care doctor  Schedule an appointment as soon as possible for a visit in 3 days                 [1]   Social History  Tobacco Use    Smoking status: Never     Passive exposure: Never    Smokeless tobacco: Never   Substance Use Topics    Alcohol use: Yes     Alcohol/week: 2.0 - 3.0 standard drinks of alcohol     Types: 2 - 3 Glasses of wine per week     Comment: Socially on the weekends    Drug use: No        Eric Lebron PA-C  04/01/25 1934

## 2025-04-02 ENCOUNTER — TELEPHONE (OUTPATIENT)
Dept: UROLOGY | Facility: CLINIC | Age: 70
End: 2025-04-02
Payer: COMMERCIAL

## 2025-04-02 ENCOUNTER — TELEPHONE (OUTPATIENT)
Dept: GASTROENTEROLOGY | Facility: CLINIC | Age: 70
End: 2025-04-02
Payer: COMMERCIAL

## 2025-04-02 NOTE — TELEPHONE ENCOUNTER
Pt was contacted . She states she did go to ER and will keep her 4/14 appt.  ----- Message from Lelo Victoria MD sent at 4/1/2025  3:12 PM CDT -----  Please notify pt that CT scan showed NO stone but did show diverticulitis. I recommend ER evaluation for further assessment and treatment.   ----- Message -----  From: Interface, Rad Results In  Sent: 4/1/2025  11:02 AM CDT  To: Lelo Victoria MD

## 2025-04-02 NOTE — DISCHARGE INSTRUCTIONS
You were seen for diverticulitis.  Take Augmentin for the next 14 days.  Liquid diet for the next 24 hours.  Low-fiber diet on antibiotics followed by high-fiber diet to prevent recurrence.  Feel better soon.

## 2025-04-02 NOTE — TELEPHONE ENCOUNTER
MA called/spoke with patient in regards to scheduling an appointment. Let patient know that Dr Matta has no available appointments at this time. Offered appointment with Katia Arora PA-C. Patient accepted/appointment scheduled.             Referral/ Appointment  Received: Today  Lona Polanco MA P Spera Melissa Staff  Attached patient has a STAT/Urgent referral ordered to see Gastroenterology. Patient states that she had her Colonoscopy done by Dr. Matta and would like to schedule with her, if possible. Thank you    Dx:Sigmoid diverticulitis [K57.32]          Summary  ID: [id number]  Patient: Apoorva Fernandez [[id number]]     Procedure: Ambulatory referral/consult to Gastroenterology Status: Scheduled (Sent to Patient)   Requested appt date: 4/8/2025 Authorizing: Eric Lebron PA-C   Referral: [id number]   (Authorized)     Expires: 5/1/2026 Priority: Urgent               Diagnosis: Sigmoid diverticulitis [K57.32]                       Order Specific Questions    Location is [place] or [place]:   No   What is the reason for visit? (If this is for a Colonoscopy - STOP and place a Case Request  (NO CLINIC APPT NEEDED)   Diverticulitis (Infection of Colon)   Referred to Region: Only select region(s) you would like the patient to be seen in if it is outside of the current encounter's department.   [facility] follow up?         Request History    Action Date and Time User Details   Request Created 04/01/2025 19:26 Eric Lebron PA-C       Released to Patient 04/01/2025 19:26 [facility], System Message       Patient Notification Sent 04/01/2025 21:29 [facility], System Message       Assigned to User 04/02/2025 08:10 Lona Polanco MA WQ: [place] [facility] Referral Orders: [facility]..., Assigned to Lona Polanco MA      Patient Called 04/02/2025 12:13 Lona Polanco MA Spoke with Patient: Details    1st attempt- Patient requested msg to be sent to see Dr. Matta, if possible       Deferred 04/02/2025 12:16 Lona Polanco [place] WQ: [place] Referral Orders: [facility]..., Deferred until 4/9/2025  0:00   Message Sent to Clinic for Scheduling, Msg sent to staff      Appointment Linked 04/02/2025 14:40 Annie Carney MA NEW PATIENT - GASTRO ([facility]) on 4/3/2025 at 9:20 AM (Scheduled)       Patient Called 04/02/2025 14:40 Annie Carney MA Spoke with Patient: Details    MA called/spoke with patient in regards to scheduling an appointment. Let patient know that Dr Matta has no available appointments at this time. Offered appointment with Katia Arora PA-C. Patient accepted/appointment scheduled.

## 2025-04-03 ENCOUNTER — OFFICE VISIT (OUTPATIENT)
Dept: GASTROENTEROLOGY | Facility: CLINIC | Age: 70
End: 2025-04-03
Payer: COMMERCIAL

## 2025-04-03 VITALS
BODY MASS INDEX: 44.64 KG/M2 | DIASTOLIC BLOOD PRESSURE: 79 MMHG | SYSTOLIC BLOOD PRESSURE: 127 MMHG | WEIGHT: 227.38 LBS | HEART RATE: 66 BPM | HEIGHT: 60 IN

## 2025-04-03 DIAGNOSIS — K21.9 GASTROESOPHAGEAL REFLUX DISEASE, UNSPECIFIED WHETHER ESOPHAGITIS PRESENT: ICD-10-CM

## 2025-04-03 DIAGNOSIS — K57.32 SIGMOID DIVERTICULITIS: Primary | ICD-10-CM

## 2025-04-03 PROCEDURE — 99999 PR PBB SHADOW E&M-EST. PATIENT-LVL III: CPT | Mod: PBBFAC,,,

## 2025-04-03 RX ORDER — PANTOPRAZOLE SODIUM 40 MG/1
40 TABLET, DELAYED RELEASE ORAL DAILY
Qty: 90 TABLET | Refills: 3 | Status: SHIPPED | OUTPATIENT
Start: 2025-04-03 | End: 2026-04-03

## 2025-04-03 NOTE — PROGRESS NOTES
[facility] Consultation Note    Reason for Consult:  The primary encounter diagnosis was Sigmoid diverticulitis. A diagnosis of Gastroesophageal reflux disease, unspecified whether esophagitis present was also pertinent to this visit.    PCP:   Najma Rangel.   [address] [place] [id number]    Referring MD:  Eric Lebron Pa-c  [address] [place] [address]    HPI:  This is a 70 y.o. female with PMHx of PUD, OA, and arthritis here for ED f/u of acute uncomplicated sigmoid diverticulitis diagnosed via CT scan. Pt presented to ED 2 days ago with 1 week of LLQ abdominal pain and one episode of diarrhea. Pt discharged home with 14 day course of Augmentin with instructions for liquid diet for 24 hours.     Today, pt reports feeling much better. Pain has improved. Reports ongoing diarrhea since starting antibiotics. No bloody stools or rectal bleeding. No N/V, fevers/chills. She had a colonoscopy June 2024 with one 4 mm TA polyp, diverticulosis and recommended repeat in 5 years. She does endorse frequent reflux about 4x/week. Has been taking tums/gaviscon as needed with relief. Reports hx of perforated gastric ulcer, but hasn't been on daily PPI in >10 years.     Objective Findings:    Vital Signs:  /79   Pulse 66   Ht 5' (1.524 m)   Wt 103.1 kg (227 lb 6.5 oz)   LMP 08/22/2015 (Exact Date)   BMI 44.41 kg/m²   Body mass index is 44.41 kg/m².    Physical Exam:  General Appearance: Well appearing in no acute distress  Abdomen: Soft, non tender, non distended in all four quadrants. No hepatosplenomegaly, ascites, or mass.    I have personally reviewed labs and imaging results.     CT Renal Stone Study (03/31/2025):  Impression:   Acute diverticulitis of the sigmoid colon with no convincing evidence of perforation or abscess.  Additional findings as above.  This report was flagged in Epic as abnormal.    Colonoscopy (06/12/2024):  Impression:   - One 4 mm polyp  in the descending colon, removed with a cold snare. Resected and retrieved.   - Diverticulosis in the sigmoid colon.   - The examination was otherwise normal.   Recommendation: Repeat colonoscopy in 5 years for surveillance.     Assessment:  1. Sigmoid diverticulitis    2. Gastroesophageal reflux disease, unspecified whether esophagitis present      This is a 70 y.o F here for f/u of acute diverticulitis, improving on antibiotic regimen. Also with frequent reflux. Plan to start PPI.     - Resume solid food diet as tolerated  - Finish antibiotics  - Advised daily fiber supplement  - Pt to let me know if symptoms return/worsen  - Start Protonix 40mg once daily  - Continue sleeping elevated and staying upright for at least 3 hours after eating    RTC 2 months.    Order summary:  Orders Placed This Encounter    pantoprazole (PROTONIX) 40 MG tablet     Thank you so much for allowing me to participate in the care of Marilyn M Rodriguez Sarah Abukhader, PA-C [facility]

## 2025-04-03 NOTE — PATIENT INSTRUCTIONS
Acid Reflux Tips  - Stay upright for at least 3 hours after eating  - Raise the head of the bed 4 to 6 inches    - Decrease excess weight    - Avoid citrus juices, acidic foods, alcohol, chocolate, caffeinated and carbonated beverages, fatty and fried foods   - Avoid tight-fitting clothing  - Avoid cigarettes and other tobacco products  - Take antacids (E.g. Tums, Rolaids, Maalox, Mylanta) for breakthrough symptoms  - If prescribed a Proton Pump Inhibitor (Protonix, Prilosec, Nexium, etc), take it 30-60 minutes before meals.       Start daily fiber supplement (such as metamucil)

## 2025-04-14 ENCOUNTER — OFFICE VISIT (OUTPATIENT)
Dept: UROLOGY | Facility: CLINIC | Age: 70
End: 2025-04-14
Payer: COMMERCIAL

## 2025-04-14 VITALS — BODY MASS INDEX: 44.24 KG/M2 | WEIGHT: 226.5 LBS

## 2025-04-14 DIAGNOSIS — R31.29 MICROHEMATURIA: ICD-10-CM

## 2025-04-14 DIAGNOSIS — Z80.52 FAMILY HISTORY OF BLADDER CANCER: ICD-10-CM

## 2025-04-14 DIAGNOSIS — R10.9 LEFT FLANK PAIN: Primary | ICD-10-CM

## 2025-04-14 PROCEDURE — 1126F AMNT PAIN NOTED NONE PRSNT: CPT | Mod: CPTII,S$GLB,, | Performed by: UROLOGY

## 2025-04-14 PROCEDURE — 99214 OFFICE O/P EST MOD 30 MIN: CPT | Mod: S$GLB,,, | Performed by: UROLOGY

## 2025-04-14 PROCEDURE — 1101F PT FALLS ASSESS-DOCD LE1/YR: CPT | Mod: CPTII,S$GLB,, | Performed by: UROLOGY

## 2025-04-14 PROCEDURE — 99999 PR PBB SHADOW E&M-EST. PATIENT-LVL III: CPT | Mod: PBBFAC,,, | Performed by: UROLOGY

## 2025-04-14 PROCEDURE — 1159F MED LIST DOCD IN RCRD: CPT | Mod: CPTII,S$GLB,, | Performed by: UROLOGY

## 2025-04-14 PROCEDURE — 3008F BODY MASS INDEX DOCD: CPT | Mod: CPTII,S$GLB,, | Performed by: UROLOGY

## 2025-04-14 PROCEDURE — 3288F FALL RISK ASSESSMENT DOCD: CPT | Mod: CPTII,S$GLB,, | Performed by: UROLOGY

## 2025-04-14 PROCEDURE — 1160F RVW MEDS BY RX/DR IN RCRD: CPT | Mod: CPTII,S$GLB,, | Performed by: UROLOGY

## 2025-04-14 NOTE — PROGRESS NOTES
"  Subjective:       Apoorva Fernandez is a 70 y.o. female who is an established patient who was referred by Dr. Chantel Rabago  for evaluation of "kidney stones and bladder health".      She was recently seen in urgent care with c/o L groin pain that radiated to back. This is associated with increased urinary frequency. CT scan ordered but has not been completed. No other recent upper tract imaging. CT 2018 without stones. She was given Flomax and Macrobid from urgent care. She notes "black grit" in urine. No prior nephrolithiasis. Son with h/o stones.    UA trace blood and bilirubin (POCT only). No urine culture sent. Pain occurs still but less frequently and less severe.     Father with bladder cancer, .     Notes R LE swelling, prior neg DVT workup by report.     CT RSS 3/25 - acute diverticulitis. No urolithiasis.     She is now seeing GI after ER evaluation for diverticulitis.       The following portions of the patient's history were reviewed and updated as appropriate: allergies, current medications, past family history, past medical history, past social history, past surgical history and problem list.    Review of Systems  12 point review of systems completed. Pertinent positive and negatives listed in HPI        Objective:    Vitals: Wt 102.7 kg (226 lb 8.4 oz)   LMP 2015 (Exact Date)   BMI 44.24 kg/m²     Physical Exam   General: well developed, well nourished in no acute distress  Head: normocephalic, atraumatic  Neck: no obvious enlargement of thyroid  HEENT: EOMI, mucus membranes moist, sclera anicteric, no hearing impairment  Lungs: symmetric expansion, non-labored breathing  Neuro: alert and oriented x 3, no gross deficits  Psych: normal judgment and insight, normal mood/affect and non-anxious  Genitourinary: deferred      Lab Review   Urine analysis today in clinic shows - no urine    Lab Results   Component Value Date    WBC 5.97 2025    HGB 13.0 2025    HGB 12.6 " 01/03/2025    HCT 38.4 04/01/2025    HCT 38.3 01/03/2025    MCV 94 04/01/2025    MCV 95 01/03/2025     04/01/2025     01/03/2025     Lab Results   Component Value Date    CREATININE 0.8 04/01/2025    BUN 13 04/01/2025       Imaging  Images and reports were personally reviewed by me and discussed with patient  CT 2018       Assessment/Plan:      1. Left flank pain    - CT RSS - no stones. +diverticulitis - now seeing GI.    - Normal kidneys on recent scan     2. Microhematuria    - Possible blood on POCT in urgent care   - UA clear initial visit and repeated in ER   - No recent UA micro     3. Family history of bladder cancer   - Father with bladder cancer   - UA repeat in 6mths      Follow up in 6mths with UA

## 2025-06-18 ENCOUNTER — OFFICE VISIT (OUTPATIENT)
Dept: GASTROENTEROLOGY | Facility: CLINIC | Age: 70
End: 2025-06-18
Payer: COMMERCIAL

## 2025-06-18 VITALS
BODY MASS INDEX: 45.57 KG/M2 | HEART RATE: 66 BPM | WEIGHT: 232.13 LBS | SYSTOLIC BLOOD PRESSURE: 138 MMHG | DIASTOLIC BLOOD PRESSURE: 83 MMHG | HEIGHT: 60 IN

## 2025-06-18 DIAGNOSIS — R11.0 NAUSEA: ICD-10-CM

## 2025-06-18 DIAGNOSIS — K57.90 DIVERTICULOSIS: Primary | ICD-10-CM

## 2025-06-18 PROCEDURE — 3288F FALL RISK ASSESSMENT DOCD: CPT | Mod: CPTII,S$GLB,,

## 2025-06-18 PROCEDURE — 1126F AMNT PAIN NOTED NONE PRSNT: CPT | Mod: CPTII,S$GLB,,

## 2025-06-18 PROCEDURE — 99213 OFFICE O/P EST LOW 20 MIN: CPT | Mod: S$GLB,,,

## 2025-06-18 PROCEDURE — 1159F MED LIST DOCD IN RCRD: CPT | Mod: CPTII,S$GLB,,

## 2025-06-18 PROCEDURE — 3008F BODY MASS INDEX DOCD: CPT | Mod: CPTII,S$GLB,,

## 2025-06-18 PROCEDURE — 3075F SYST BP GE 130 - 139MM HG: CPT | Mod: CPTII,S$GLB,,

## 2025-06-18 PROCEDURE — 1101F PT FALLS ASSESS-DOCD LE1/YR: CPT | Mod: CPTII,S$GLB,,

## 2025-06-18 PROCEDURE — 99999 PR PBB SHADOW E&M-EST. PATIENT-LVL III: CPT | Mod: PBBFAC,,,

## 2025-06-18 PROCEDURE — 3079F DIAST BP 80-89 MM HG: CPT | Mod: CPTII,S$GLB,,

## 2025-06-18 RX ORDER — ONDANSETRON 4 MG/1
4 TABLET, ORALLY DISINTEGRATING ORAL EVERY 6 HOURS PRN
Qty: 30 TABLET | Refills: 3 | Status: SHIPPED | OUTPATIENT
Start: 2025-06-18

## 2025-06-18 NOTE — PROGRESS NOTES
[facility] Follow-UP Note    Reason for Follow-Up:  The primary encounter diagnosis was Diverticulosis. A diagnosis of Nausea was also pertinent to this visit.    PCP:   Najma Rangel       HPI:  This is a 70 y.o. female with PMHx of PUD, OA, and arthritis last seen in GI clinic on 4/3/2025 for ED f/u of acute uncomplicated sigmoid diverticulitis diagnosed via CT scan 2 days priors. Pt was discharged home with 14 day course of Augmentin with instructions for liquid diet for 24 hours. She had reported complete relief of pain, but ongoing diarrhea. No bloody stools or rectal bleeding. No N/V, fevers/chills. She had a colonoscopy June 2024 with one 4 mm TA polyp, diverticulosis and recommended repeat in 5 years. Advised her to finish antibiotics and resume diet as tolerated.   She also reported frequent reflux, so se was prescribed Protonix 40mg once daily.     Interval History:  Today, pt presents for follow up of diverticulitis. She completed antibiotics and feels back to normal. No new complaints. Reports regular, formed daily BM with daily Metamucil. She reports going on a cruise this summer and states Rx nausea medications was the only thing that helped her previously. Pt denies abdominal pain, N/V, diarrhea, constipation, bloody stools, rectal bleeding, or melena. Her reflux is well controlled with diet and lifestyle. She self discontinued PPI.     Objective Findings:    Vital Signs:  /83   Pulse 66   Ht 5' (1.524 m)   Wt 105.3 kg (232 lb 2.3 oz)   LMP 08/22/2015 (Exact Date)   BMI 45.34 kg/m²   Body mass index is 45.34 kg/m².    Physical Exam:  General Appearance: Well appearing in no acute distress  Abdomen: Soft, non tender, non distended in all four quadrants. No hepatosplenomegaly, ascites, or mass    Assessment:  1. Diverticulosis    2. Nausea      This is a 70 y.o F here for f/u after diverticulitis. Doing well, no complaints. Requesting antiemetic medication for  upcoming cruise. Advised she continue with daily fiber supplement. Recommended liquid diet for a few days if she feels diverticulitis Sx recurring.     RTC as needed    Order summary:  Orders Placed This Encounter    ondansetron (ZOFRAN-ODT) 4 MG TbDL     Thank you so much for allowing me to participate in the care of Marilyn M Rodriguez Sarah Abukhader, PA-C [facility]

## 2025-06-20 ENCOUNTER — PATIENT MESSAGE (OUTPATIENT)
Dept: FAMILY MEDICINE | Facility: CLINIC | Age: 70
End: 2025-06-20
Payer: COMMERCIAL

## 2025-06-24 RX ORDER — SCOPOLAMINE 1 MG/3D
1 PATCH, EXTENDED RELEASE TRANSDERMAL
Qty: 10 PATCH | Refills: 0 | Status: SHIPPED | OUTPATIENT
Start: 2025-06-24 | End: 2025-07-04

## 2025-08-04 ENCOUNTER — PATIENT MESSAGE (OUTPATIENT)
Dept: FAMILY MEDICINE | Facility: CLINIC | Age: 70
End: 2025-08-04
Payer: COMMERCIAL

## 2025-08-07 ENCOUNTER — OFFICE VISIT (OUTPATIENT)
Dept: FAMILY MEDICINE | Facility: CLINIC | Age: 70
End: 2025-08-07
Payer: COMMERCIAL

## 2025-08-07 VITALS
HEART RATE: 67 BPM | WEIGHT: 209.13 LBS | BODY MASS INDEX: 41.06 KG/M2 | OXYGEN SATURATION: 98 % | DIASTOLIC BLOOD PRESSURE: 82 MMHG | HEIGHT: 60 IN | TEMPERATURE: 98 F | SYSTOLIC BLOOD PRESSURE: 128 MMHG

## 2025-08-07 DIAGNOSIS — J32.9 RECURRENT SINUSITIS: Primary | ICD-10-CM

## 2025-08-07 DIAGNOSIS — B37.9 YEAST INFECTION: ICD-10-CM

## 2025-08-07 LAB
FLUAV AG UPPER RESP QL IA.RAPID: NEGATIVE
FLUBV AG UPPER RESP QL IA.RAPID: NEGATIVE
RSV A 5' UTR RNA NPH QL NAA+PROBE: NEGATIVE
SARS-COV-2 RNA RESP QL NAA+PROBE: POSITIVE

## 2025-08-07 PROCEDURE — G2211 COMPLEX E/M VISIT ADD ON: HCPCS | Mod: S$GLB,,,

## 2025-08-07 PROCEDURE — 1101F PT FALLS ASSESS-DOCD LE1/YR: CPT | Mod: CPTII,S$GLB,,

## 2025-08-07 PROCEDURE — 3288F FALL RISK ASSESSMENT DOCD: CPT | Mod: CPTII,S$GLB,,

## 2025-08-07 PROCEDURE — 3008F BODY MASS INDEX DOCD: CPT | Mod: CPTII,S$GLB,,

## 2025-08-07 PROCEDURE — 87637 SARSCOV2&INF A&B&RSV AMP PRB: CPT

## 2025-08-07 PROCEDURE — 3074F SYST BP LT 130 MM HG: CPT | Mod: CPTII,S$GLB,,

## 2025-08-07 PROCEDURE — 1160F RVW MEDS BY RX/DR IN RCRD: CPT | Mod: CPTII,S$GLB,,

## 2025-08-07 PROCEDURE — 99214 OFFICE O/P EST MOD 30 MIN: CPT | Mod: S$GLB,,,

## 2025-08-07 PROCEDURE — 3079F DIAST BP 80-89 MM HG: CPT | Mod: CPTII,S$GLB,,

## 2025-08-07 PROCEDURE — 1126F AMNT PAIN NOTED NONE PRSNT: CPT | Mod: CPTII,S$GLB,,

## 2025-08-07 PROCEDURE — 1159F MED LIST DOCD IN RCRD: CPT | Mod: CPTII,S$GLB,,

## 2025-08-07 PROCEDURE — 99999 PR PBB SHADOW E&M-EST. PATIENT-LVL IV: CPT | Mod: PBBFAC,,,

## 2025-08-07 RX ORDER — FLUCONAZOLE 150 MG/1
150 TABLET ORAL DAILY
Qty: 1 TABLET | Refills: 0 | Status: SHIPPED | OUTPATIENT
Start: 2025-08-07 | End: 2025-08-08

## 2025-08-07 RX ORDER — PROMETHAZINE HYDROCHLORIDE AND DEXTROMETHORPHAN HYDROBROMIDE 6.25; 15 MG/5ML; MG/5ML
5 SYRUP ORAL EVERY 4 HOURS PRN
Qty: 118 ML | Refills: 0 | Status: SHIPPED | OUTPATIENT
Start: 2025-08-07 | End: 2025-08-17

## 2025-08-07 RX ORDER — DOXYCYCLINE 100 MG/1
100 CAPSULE ORAL 2 TIMES DAILY
Qty: 14 CAPSULE | Refills: 0 | Status: SHIPPED | OUTPATIENT
Start: 2025-08-07 | End: 2025-08-14

## 2025-08-07 NOTE — PROGRESS NOTES
Family Medicine      Patient Name: Apoorva Fernandez  MRN: [id number]  : 1955    PCP: Najma Rangel MD       HPI      Apoorva Fernandez is a 70 y.o. female with multiple medical diagnoses as listed in the medical history and problem list that presents for   Chief Complaint   Patient presents with    Sinus Problem       Sinus Problem  This is a chronic problem. The current episode started 1 to 4 weeks ago. There has been no fever. The pain is moderate. Associated symptoms include headaches, sinus pressure and sneezing (occasional). Pertinent negatives include no congestion. Past treatments include saline nose sprays, nasal decongestants and acetaminophen. The treatment provided mild relief.            History      Past Medical History:  Past Medical History:   Diagnosis Date    Allergic rhinitis     Allergy     Arthritis     Joint pain     Keloid cicatrix     Obesity     Osteoarthritis     Personal history of colonic polyps 2010    Adenomatous    Personal history of peptic ulcer disease     PONV (postoperative nausea and vomiting)     Sleep apnea     Umbilical hernia        Past Surgical History:  Past Surgical History:   Procedure Laterality Date    Abdominal/umbilical hernia repair      x2, removed small part of small bowel; mesh placed     SECTION, LOW TRANSVERSE      X2    CHOLECYSTECTOMY      COLONOSCOPY N/A 2019    Procedure: COLONOSCOPY;  Surgeon: Chandrika Ho MD;  Location: [facility];  Service: Endoscopy;  Laterality: N/A;    COLONOSCOPY N/A 2024    Procedure: COLONOSCOPY;  Surgeon: Stephanie Matta MD;  Location: [facility];  Service: Endoscopy;  Laterality: N/A;  Ref by WILLIAN Rangel, DEE DEE, portal - PC  24- lvm and portal msg for pc. DBM  24- lvm for pc. DBM    EXPLORATORY LAPAROTOMY      ruptured peptic ulcer    TOTAL KNEE ARTHROPLASTY Right 08/15/2022    Procedure: ARTHROPLASTY, KNEE, TOTAL- JOAN;  Surgeon: Jass Willis MD;  Location:  [facility] OR;  Service: Orthopedics;  Laterality: Right;  NAE MIL SALGADO [phone] TEXTED HIM @ 7:06AM  ON 27:06AM ON 2022  1ST BALWINDER DAVIS [phone] TEXTED HER @ 7:16AM ON 2022 REPLIED @ 7:18AM ON 2022  NOON START  SUPINE  RN PREOP 2022    T/S ON 2022       HAS MEDICAL TESHA       Social History:  Social History[1]    Family History:  Family History   Problem Relation Name Age of Onset    Breast cancer Mother Jackie Camacho          at age 65    Cancer Father          Bladder    Glaucoma Father      Hypertension Father      Heart disease Father      Obesity Sister Suyapa     Heart disease Brother Pankaj     Sleep apnea Brother Reji     Diabetes Neg Hx      Melanoma Neg Hx         Allergies and Medications: (updated and reviewed)  Review of patient's allergies indicates:   Allergen Reactions    Amlodipine Other (See Comments)     Chest pressure    Antihistamines - alkylamine Shortness Of Breath    Losartan Shortness Of Breath    Atorvastatin      Other reaction(s): myalgia  Other reaction(s): myalgia    Ezetimibe      Other reaction(s): muscle cramps  Other reaction(s): muscle cramps    Pravastatin Other (See Comments)      myalgia    Lactose intolerance (lactase) [lactase] Nausea Only     Current Medications[2]    Patient Care Team:  Najma Rangel MD as PCP - General (Internal Medicine)  BarronettMalena LPN as Care Coordinator  Dr. Sj Fraga (Optometry)         Exam      Review of Systems:  (as noted above)  Review of Systems   HENT:  Positive for sinus pressure, sinus pain and sneezing (occasional). Negative for congestion and rhinorrhea.    Gastrointestinal:  Positive for diarrhea (intemrittently) and nausea.   Neurological:  Positive for headaches.       Physical Exam:  Physical Exam  Vitals reviewed.   Constitutional:       General: She is not in acute distress.     Appearance: Normal appearance. She is normal weight. She is not ill-appearing.   HENT:       Head: Normocephalic and atraumatic.      Right Ear: Tympanic membrane normal.      Left Ear: Tympanic membrane normal.      Nose: No congestion or rhinorrhea.   Eyes:      Extraocular Movements: Extraocular movements intact.      Pupils: Pupils are equal, round, and reactive to light.   Cardiovascular:      Rate and Rhythm: Normal rate.      Pulses: Normal pulses.      Heart sounds: Normal heart sounds.   Pulmonary:      Effort: Pulmonary effort is normal. No respiratory distress.      Breath sounds: Normal breath sounds. No wheezing.   Abdominal:      General: Abdomen is flat.   Musculoskeletal:         General: Normal range of motion.      Cervical back: Normal range of motion.   Neurological:      Mental Status: She is alert and oriented to person, place, and time. Mental status is at baseline.   Psychiatric:         Mood and Affect: Mood normal.         Behavior: Behavior normal.       Vitals:    08/07/25 1502   BP: 128/82   Pulse: 67   Temp: 98.1 °F (36.7 °C)   TempSrc: Oral   SpO2: 98%   Weight: 94.8 kg (209 lb 1.7 oz)   Height: 5' (1.524 m)      Body mass index is 40.84 kg/m².           Assessment & Plan      1. Recurrent sinusitis  - Patient had COVID 3 weeks ago. Symptoms improved, but constant sinus and head pressure ever since. No coughing. Mostly clear mucus, but feels like mucus is stuck in her head. Occasional thick green mucus from nose.  - SARS-Cov2 (COVID) with FLU/RSV by PCR  - promethazine-dextromethorphan (PROMETHAZINE-DM) 6.25-15 mg/5 mL Syrp; Take 5 mLs by mouth every 4 (four) hours as needed (cough and congestion).  Dispense: 118 mL; Refill: 0  - doxycycline (VIBRAMYCIN) 100 MG Cap; Take 1 capsule (100 mg total) by mouth 2 (two) times daily. for 7 days  Dispense: 14 capsule; Refill: 0  - Ambulatory referral/consult to ENT; Future    2. Yeast infection  - Patient has a history of yeast infections on ABX.  - fluconazole (DIFLUCAN) 150 MG Tab; Take 1 tablet (150 mg total) by mouth once  daily. for 1 day  Dispense: 1 tablet; Refill: 0           --------------------------------------------      Health Maintenance:  Health Maintenance         Date Due Completion Date    RSV Vaccine (Age 60+ and Pregnant patients) (1 - Risk 60-74 years 1-dose series) Never done ---    TETANUS VACCINE 07/15/2018 7/15/2008    Influenza Vaccine (1) 09/01/2025 11/7/2024    Mammogram 02/05/2026 2/5/2025    Aspirin/Antiplatelet Therapy 08/07/2026 8/7/2025    Colorectal Cancer Screening 06/12/2029 6/12/2024    Lipid Panel 07/12/2029 7/12/2024    DEXA Scan 01/14/2030 1/14/2020            Health maintenance reviewed    Follow Up:  No follow-ups on file.        30 minutes of total time spent on the encounter, which includes face to face time and non-face to face time preparing to see the patient (eg, review of tests), Obtaining and/or reviewing separately obtained history, Documenting clinical information in the electronic or other health record, Independently interpreting results (not separately reported) and communicating results to the patient/family/caregiver, or Care coordination (not separately reported).         - The patient is given an After Visit Summary that lists all medications with directions, allergies, education, orders placed during this encounter and follow-up instructions.      - I have reviewed the patient's medical information including past medical, family, and social history sections including the medications and allergies.      - We discussed the patient's current medications.     This note was created by combination of typed  and MModal dictation.  Transcription errors may be present.  If there are any questions, please contact me.     Jo Kilgore PA-C  [facility]               [1]   Social History  Socioeconomic History    Marital status:     Number of children: 2   Occupational History    Occupation:    Tobacco Use    Smoking  status: Never     Passive exposure: Never    Smokeless tobacco: Never   Substance and Sexual Activity    Alcohol use: Yes     Alcohol/week: 2.0 - 3.0 standard drinks of alcohol     Types: 2 - 3 Glasses of wine per week     Comment: Socially on the weekends    Drug use: No    Sexual activity: Yes     Partners: Male     Social Drivers of Health     Financial Resource Strain: Low Risk  (3/30/2025)    Overall Financial Resource Strain (CARDIA)     Difficulty of Paying Living Expenses: Not hard at all   Food Insecurity: No Food Insecurity (3/30/2025)    Hunger Vital Sign     Worried About Running Out of Food in the Last Year: Never true     Ran Out of Food in the Last Year: Never true   Transportation Needs: No Transportation Needs (3/30/2025)    PRAPARE - Transportation     Lack of Transportation (Medical): No     Lack of Transportation (Non-Medical): No   Physical Activity: Insufficiently Active (3/30/2025)    Exercise Vital Sign     Days of Exercise per Week: 2 days     Minutes of Exercise per Session: 30 min   Stress: No Stress Concern Present (3/30/2025)    [employer] - Occupational Stress Questionnaire     Feeling of Stress : Not at all   Housing Stability: Low Risk  (3/30/2025)    Housing Stability Vital Sign     Unable to Pay for Housing in the Last Year: No     Number of Times Moved in the Last Year: 0     Homeless in the Last Year: No   [2]   Current Outpatient Medications   Medication Sig Dispense Refill    aspirin (ECOTRIN) 81 MG EC tablet Take 1 tablet (81 mg total) by mouth once daily.      clotrimazole-betamethasone 1-0.05% (LOTRISONE) cream Apply topically 2 (two) times daily. 45 g 4    fluticasone propionate (FLONASE) 50 mcg/actuation nasal spray SPRAY 2 SPRAYs INTO EACH NOSTRIL ONCE EVERY DAY 48 mL 2    multivitamin (ONE DAILY MULTIVITAMIN) per tablet Take 1 tablet by mouth once daily.      omega-3 fatty acids/fish oil (FISH OIL-OMEGA-3 FATTY ACIDS) 300-1,000 mg capsule  Take by mouth once daily.      triamterene-hydrochlorothiazide 37.5-25 mg (MAXZIDE-25) 37.5-25 mg per tablet Take HALF a tablet by mouth once daily. 45 tablet 2    doxycycline (VIBRAMYCIN) 100 MG Cap Take 1 capsule (100 mg total) by mouth 2 (two) times daily. for 7 days 14 capsule 0    fluconazole (DIFLUCAN) 150 MG Tab Take 1 tablet (150 mg total) by mouth once daily. for 1 day 1 tablet 0    nitroGLYCERIN (NITROSTAT) 0.4 MG SL tablet Place 1 tablet (0.4 mg total) under the tongue every 5 (five) minutes as needed for Chest pain. (Patient not taking: Reported on 8/7/2025) 25 tablet 0    ondansetron (ZOFRAN-ODT) 4 MG TbDL Take 1 tablet (4 mg total) by mouth every 6 (six) hours as needed (Take 1 tablet every 6 hours as needed for nausea). (Patient not taking: Reported on 8/7/2025) 30 tablet 3    pantoprazole (PROTONIX) 40 MG tablet Take 1 tablet (40 mg total) by mouth once daily. Take 30-60 min before dinner (Patient not taking: Reported on 8/7/2025) 90 tablet 3    promethazine-dextromethorphan (PROMETHAZINE-DM) 6.25-15 mg/5 mL Syrp Take 5 mLs by mouth every 4 (four) hours as needed (cough and congestion). 118 mL 0    UNABLE TO FIND OTC Probiotic       No current facility-administered medications for this visit.

## (undated) DEVICE — KIT TRIATHLON CR FEM PREP SZ3

## (undated) DEVICE — KIT CHECKPOINT MAKO

## (undated) DEVICE — COVER OVERHEAD SURG LT BLUE

## (undated) DEVICE — NDL HYPO REG 25G X 1 1/2

## (undated) DEVICE — DRAPE ABDOMINAL TIBURON 14X11

## (undated) DEVICE — KIT DRAPE RIO ONE PIECE W/POCK

## (undated) DEVICE — PATCH HERNIA VENTRIO ST LG.: Type: IMPLANTABLE DEVICE | Status: NON-FUNCTIONAL

## (undated) DEVICE — Device

## (undated) DEVICE — SEE MEDLINE ITEM 157117

## (undated) DEVICE — SEE MEDLINE ITEM 146417

## (undated) DEVICE — SUT VICRYL+ 1 CT1 18IN

## (undated) DEVICE — SOL NACL 0.9% INJ PF/100 150

## (undated) DEVICE — SUT 1 36IN PDS II VIO MONO

## (undated) DEVICE — SEE MEDLINE ITEM 152622

## (undated) DEVICE — BLADE TONGUE DEPRESSOR STRL

## (undated) DEVICE — SUT VICRYL CT-1 2-0 27IN

## (undated) DEVICE — SUT COATED VICRYL 4/0 27IN

## (undated) DEVICE — APPLICATOR CHLORAPREP ORN 26ML

## (undated) DEVICE — DRAPE STERI U-SHAPED 47X51IN

## (undated) DEVICE — KIT VIZADISC KNEE TRACKING

## (undated) DEVICE — GAUZE SPONGE 4X4 12PLY

## (undated) DEVICE — KIT LEG POSITIONER DISPOSABLES

## (undated) DEVICE — SEE MEDLINE ITEM 157150

## (undated) DEVICE — DRESSING AQUACEL AG 3.5X10IN

## (undated) DEVICE — TOURNIQUET SB QC DP 34X4IN

## (undated) DEVICE — BINDER ABDOMINAL 9 46-62

## (undated) DEVICE — EVACUATOR WOUND BULB 100CC

## (undated) DEVICE — STAPLER SKIN ROTATING HEAD

## (undated) DEVICE — PAD COLD THERAPY KNEE WRAP ON

## (undated) DEVICE — CORD SILICONE RETRACTOR

## (undated) DEVICE — SYR 50CC LL

## (undated) DEVICE — TOWEL OR DISP STRL BLUE 4/PK

## (undated) DEVICE — SUT STRATAFIX PDS PLS 45CM

## (undated) DEVICE — SPONGE LAP 18X18 PREWASHED

## (undated) DEVICE — GOWN SURGICAL X-LARGE

## (undated) DEVICE — ELECTRODE REM PLYHSV RETURN 9

## (undated) DEVICE — PULSAVAC ZIMMER

## (undated) DEVICE — DRESSING COVER AQUACEL AG SURG

## (undated) DEVICE — NDL SPINAL 20GX3.5 HUB

## (undated) DEVICE — SUT VICRYL PLUS 2-0 CT1 18

## (undated) DEVICE — SOL IRR NACL .9% 3000ML

## (undated) DEVICE — KIT TRIATHLON CR TIB PREP SZ3

## (undated) DEVICE — SEE MEDLINE ITEM 154981

## (undated) DEVICE — BOWL STERILE LARGE 32OZ

## (undated) DEVICE — STAPLER SKIN PROXIMATE WIDE

## (undated) DEVICE — SUT 2-0 12-18IN SILK

## (undated) DEVICE — SUT ETHILON 2-0 PSLX 30IN

## (undated) DEVICE — SPONGE DERMACEA 4X4IN 12PLY

## (undated) DEVICE — STOCKING KNEE HIGH LG REGULAR

## (undated) DEVICE — BLANKET UPPER BODY 78.7X29.9IN

## (undated) DEVICE — TRAY MINOR GEN SURG

## (undated) DEVICE — HOOD FLYTE PEELWY STERISHIELD

## (undated) DEVICE — SYS CLSR DERMABOND PRINEO 22CM

## (undated) DEVICE — UNDERGLOVES BIOGEL PI SIZE 8

## (undated) DEVICE — GLOVE SURGICAL LATEX SZ 8

## (undated) DEVICE — BLADE SAW OSC 19.5 X 1.2 X 90

## (undated) DEVICE — NDL 18GA X1 1/2 REG BEVEL

## (undated) DEVICE — DRESSING ADH ISLAND 3.6 X 14

## (undated) DEVICE — PATELLA TRIATHLON 27X8 SYMTRC: Type: IMPLANTABLE DEVICE | Site: KNEE | Status: NON-FUNCTIONAL